# Patient Record
Sex: FEMALE | Race: ASIAN | NOT HISPANIC OR LATINO | Employment: PART TIME | URBAN - METROPOLITAN AREA
[De-identification: names, ages, dates, MRNs, and addresses within clinical notes are randomized per-mention and may not be internally consistent; named-entity substitution may affect disease eponyms.]

---

## 2017-08-04 ENCOUNTER — OFFICE VISIT - HEALTHEAST (OUTPATIENT)
Dept: FAMILY MEDICINE | Facility: CLINIC | Age: 27
End: 2017-08-04

## 2017-08-04 DIAGNOSIS — R53.83 FATIGUE: ICD-10-CM

## 2017-09-26 ENCOUNTER — COMMUNICATION - HEALTHEAST (OUTPATIENT)
Dept: FAMILY MEDICINE | Facility: CLINIC | Age: 27
End: 2017-09-26

## 2017-10-02 ENCOUNTER — COMMUNICATION - HEALTHEAST (OUTPATIENT)
Dept: FAMILY MEDICINE | Facility: CLINIC | Age: 27
End: 2017-10-02

## 2017-10-13 ENCOUNTER — OFFICE VISIT - HEALTHEAST (OUTPATIENT)
Dept: FAMILY MEDICINE | Facility: CLINIC | Age: 27
End: 2017-10-13

## 2017-10-13 DIAGNOSIS — L21.9 SEBORRHEIC DERMATITIS OF SCALP: ICD-10-CM

## 2017-10-13 DIAGNOSIS — Z31.69 ENCOUNTER FOR PRECONCEPTION CONSULTATION: ICD-10-CM

## 2017-10-13 DIAGNOSIS — Z30.432 ENCOUNTER FOR IUD REMOVAL: ICD-10-CM

## 2017-10-13 DIAGNOSIS — Z00.00 ROUTINE HEALTH MAINTENANCE: ICD-10-CM

## 2017-10-13 ASSESSMENT — MIFFLIN-ST. JEOR: SCORE: 1283.65

## 2018-01-10 ENCOUNTER — COMMUNICATION - HEALTHEAST (OUTPATIENT)
Dept: FAMILY MEDICINE | Facility: CLINIC | Age: 28
End: 2018-01-10

## 2018-01-10 ENCOUNTER — AMBULATORY - HEALTHEAST (OUTPATIENT)
Dept: ADMINISTRATIVE | Facility: CLINIC | Age: 28
End: 2018-01-10

## 2018-01-10 ENCOUNTER — PRENATAL OFFICE VISIT - HEALTHEAST (OUTPATIENT)
Dept: FAMILY MEDICINE | Facility: CLINIC | Age: 28
End: 2018-01-10

## 2018-01-10 DIAGNOSIS — Z34.90 NORMAL PREGNANCY: ICD-10-CM

## 2018-01-10 DIAGNOSIS — N92.6 ABNORMAL MENSES: ICD-10-CM

## 2018-01-10 DIAGNOSIS — Z32.01 POSITIVE PREGNANCY TEST: ICD-10-CM

## 2018-01-10 DIAGNOSIS — Z31.69 ENCOUNTER FOR PRECONCEPTION CONSULTATION: ICD-10-CM

## 2018-01-10 LAB
ALBUMIN UR-MCNC: NEGATIVE MG/DL
AMPHETAMINES UR QL SCN: NORMAL
BARBITURATES UR QL: NORMAL
BASOPHILS # BLD AUTO: 0 THOU/UL (ref 0–0.2)
BASOPHILS NFR BLD AUTO: 0 % (ref 0–2)
BENZODIAZ UR QL: NORMAL
CANNABINOIDS UR QL SCN: NORMAL
COCAINE UR QL: NORMAL
COLLECTION METHOD: NORMAL
CREAT UR-MCNC: 156.7 MG/DL
EOSINOPHIL # BLD AUTO: 0.2 THOU/UL (ref 0–0.4)
EOSINOPHIL NFR BLD AUTO: 2 % (ref 0–6)
ERYTHROCYTE [DISTWIDTH] IN BLOOD BY AUTOMATED COUNT: 13.1 % (ref 11–14.5)
GLUCOSE UR STRIP-MCNC: NEGATIVE MG/DL
HCG UR QL: POSITIVE
HCT VFR BLD AUTO: 41.9 % (ref 35–47)
HGB BLD-MCNC: 13.7 G/DL (ref 12–16)
HIV 1+2 AB+HIV1 P24 AG SERPL QL IA: NEGATIVE
KETONES UR STRIP-MCNC: NEGATIVE MG/DL
LEAD BLD-MCNC: NORMAL UG/DL
LEAD RETEST: NO
LYMPHOCYTES # BLD AUTO: 1.8 THOU/UL (ref 0.8–4.4)
LYMPHOCYTES NFR BLD AUTO: 16 % (ref 20–40)
MCH RBC QN AUTO: 27.1 PG (ref 27–34)
MCHC RBC AUTO-ENTMCNC: 32.7 G/DL (ref 32–36)
MCV RBC AUTO: 83 FL (ref 80–100)
MONOCYTES # BLD AUTO: 0.5 THOU/UL (ref 0–0.9)
MONOCYTES NFR BLD AUTO: 5 % (ref 2–10)
NEUTROPHILS # BLD AUTO: 8.4 THOU/UL (ref 2–7.7)
NEUTROPHILS NFR BLD AUTO: 77 % (ref 50–70)
OPIATES UR QL SCN: NORMAL
OXYCODONE UR QL: NORMAL
PCP UR QL SCN: NORMAL
PLATELET # BLD AUTO: 340 THOU/UL (ref 140–440)
PMV BLD AUTO: 10.6 FL (ref 8.5–12.5)
RBC # BLD AUTO: 5.06 MILL/UL (ref 3.8–5.4)
WBC: 11 THOU/UL (ref 4–11)

## 2018-01-11 LAB
ABO/RH(D): NORMAL
ABORH REPEAT: NORMAL
ANTIBODY SCREEN: NEGATIVE
BACTERIA SPEC CULT: NO GROWTH
GUARDIAN FIRST NAME: NORMAL
GUARDIAN LAST NAME: NORMAL
HBV SURFACE AG SERPL QL IA: NEGATIVE
HEALTH CARE PROVIDER CITY: NORMAL
HEALTH CARE PROVIDER NAME: NORMAL
HEALTH CARE PROVIDER PHONE: NORMAL
HEALTH CARE PROVIDER STATE: NORMAL
HEALTH CARE PROVIDER STREET ADDRESS: NORMAL
HEALTH CARE PROVIDER ZIP CODE: NORMAL
LEAD, B: 1.5 MCG/DL (ref 0–4.9)
PATIENT CITY: NORMAL
PATIENT COUNTY: NORMAL
PATIENT EMPLOYER: NORMAL
PATIENT ETHNICITY: NORMAL
PATIENT HOME PHONE: NORMAL
PATIENT OCCUPATION: NORMAL
PATIENT RACE: NORMAL
PATIENT STATE: NORMAL
PATIENT STREET ADDRESS: NORMAL
PATIENT ZIP CODE: NORMAL
RUBV IGG SERPL QL IA: NORMAL
SUBMITTING LABORATORY PHONE: NORMAL
SYPHILIS RPR SCREEN - HISTORICAL: NORMAL
VENOUS/CAPILLARY: NORMAL

## 2018-02-02 ENCOUNTER — PRENATAL OFFICE VISIT - HEALTHEAST (OUTPATIENT)
Dept: FAMILY MEDICINE | Facility: CLINIC | Age: 28
End: 2018-02-02

## 2018-02-02 DIAGNOSIS — Z34.92 SUPERVISION OF NORMAL PREGNANCY IN SECOND TRIMESTER: ICD-10-CM

## 2018-02-02 LAB
ALBUMIN UR-MCNC: NEGATIVE MG/DL
APPEARANCE UR: CLEAR
BILIRUB UR QL STRIP: NEGATIVE
COLOR UR AUTO: YELLOW
GLUCOSE UR STRIP-MCNC: ABNORMAL MG/DL
HGB UR QL STRIP: NEGATIVE
KETONES UR STRIP-MCNC: NEGATIVE MG/DL
LEUKOCYTE ESTERASE UR QL STRIP: NEGATIVE
NITRATE UR QL: NEGATIVE
PH UR STRIP: 7 [PH] (ref 5–8)
SP GR UR STRIP: 1.01 (ref 1–1.03)
UROBILINOGEN UR STRIP-ACNC: ABNORMAL

## 2018-02-05 LAB
ALPHA FETO PROTEIN: NORMAL
CALCULATED AGE AT EDD: NORMAL
DOWN SYNDROME MATERNAL AGE RISK: NORMAL
DOWN SYNDROME SCREEN RISK ESTIMATE: NORMAL
EDD BY LMP: NORMAL
GA ON COLLECTION BY DATES: NORMAL WK,D
GA USED IN RISK ESTIMATE: NORMAL
GENERAL TEST INFORMATION: NORMAL
HCG, TOTAL: NORMAL
INHIBIN: NORMAL
INSULIN DIABETIC: NORMAL
INTERPRETATION: NORMAL
OTHER INFORMATION: NORMAL
PATIENT WEIGHT: 159 LBS
RACE OF MOTHER: NORMAL
RECOMMENDED FOLLOW UP: NORMAL
TRISOMY 18 SCREEN RISK ESTIMATE: NORMAL
UE3: NORMAL

## 2018-03-02 ENCOUNTER — AMBULATORY - HEALTHEAST (OUTPATIENT)
Dept: ADMINISTRATIVE | Facility: CLINIC | Age: 28
End: 2018-03-02

## 2018-03-02 ENCOUNTER — PRENATAL OFFICE VISIT - HEALTHEAST (OUTPATIENT)
Dept: FAMILY MEDICINE | Facility: CLINIC | Age: 28
End: 2018-03-02

## 2018-03-02 DIAGNOSIS — Z34.92 SUPERVISION OF NORMAL PREGNANCY IN SECOND TRIMESTER: ICD-10-CM

## 2018-03-05 ENCOUNTER — COMMUNICATION - HEALTHEAST (OUTPATIENT)
Dept: FAMILY MEDICINE | Facility: CLINIC | Age: 28
End: 2018-03-05

## 2018-03-30 ENCOUNTER — PRENATAL OFFICE VISIT - HEALTHEAST (OUTPATIENT)
Dept: FAMILY MEDICINE | Facility: CLINIC | Age: 28
End: 2018-03-30

## 2018-03-30 DIAGNOSIS — R06.00 DYSPNEA: ICD-10-CM

## 2018-03-30 DIAGNOSIS — Z34.92 SUPERVISION OF NORMAL PREGNANCY IN SECOND TRIMESTER: ICD-10-CM

## 2018-03-30 LAB
ALBUMIN UR-MCNC: ABNORMAL MG/DL
ANION GAP SERPL CALCULATED.3IONS-SCNC: 14 MMOL/L (ref 5–18)
APPEARANCE UR: CLEAR
ATRIAL RATE - MUSE: 114 BPM
BILIRUB UR QL STRIP: NEGATIVE
BUN SERPL-MCNC: 7 MG/DL (ref 8–22)
CALCIUM SERPL-MCNC: 9 MG/DL (ref 8.5–10.5)
CHLORIDE BLD-SCNC: 101 MMOL/L (ref 98–107)
CO2 SERPL-SCNC: 18 MMOL/L (ref 22–31)
COLOR UR AUTO: YELLOW
CREAT SERPL-MCNC: 0.57 MG/DL (ref 0.6–1.1)
DIASTOLIC BLOOD PRESSURE - MUSE: NORMAL MMHG
ERYTHROCYTE [DISTWIDTH] IN BLOOD BY AUTOMATED COUNT: 12 % (ref 11–14.5)
FERRITIN SERPL-MCNC: 50 NG/ML (ref 10–130)
GFR SERPL CREATININE-BSD FRML MDRD: >60 ML/MIN/1.73M2
GLUCOSE BLD-MCNC: 158 MG/DL (ref 70–125)
GLUCOSE UR STRIP-MCNC: ABNORMAL MG/DL
HBA1C MFR BLD: 6.2 % (ref 3.5–6)
HCT VFR BLD AUTO: 39.9 % (ref 35–47)
HGB BLD-MCNC: 12.6 G/DL (ref 12–16)
HGB UR QL STRIP: NEGATIVE
INTERPRETATION ECG - MUSE: NORMAL
KETONES UR STRIP-MCNC: ABNORMAL MG/DL
LEUKOCYTE ESTERASE UR QL STRIP: NEGATIVE
MCH RBC QN AUTO: 26.9 PG (ref 27–34)
MCHC RBC AUTO-ENTMCNC: 31.5 G/DL (ref 32–36)
MCV RBC AUTO: 85 FL (ref 80–100)
NITRATE UR QL: NEGATIVE
P AXIS - MUSE: 27 DEGREES
PH UR STRIP: 7 [PH] (ref 5–8)
PLATELET # BLD AUTO: 327 THOU/UL (ref 140–440)
PMV BLD AUTO: 7.9 FL (ref 7–10)
POTASSIUM BLD-SCNC: 3.7 MMOL/L (ref 3.5–5)
PR INTERVAL - MUSE: 138 MS
QRS DURATION - MUSE: 78 MS
QT - MUSE: 340 MS
QTC - MUSE: 468 MS
R AXIS - MUSE: 41 DEGREES
RBC # BLD AUTO: 4.68 MILL/UL (ref 3.8–5.4)
SODIUM SERPL-SCNC: 133 MMOL/L (ref 136–145)
SP GR UR STRIP: 1.02 (ref 1–1.03)
SYSTOLIC BLOOD PRESSURE - MUSE: NORMAL MMHG
T AXIS - MUSE: -21 DEGREES
T4 FREE SERPL-MCNC: 0.7 NG/DL (ref 0.7–1.8)
TSH SERPL DL<=0.005 MIU/L-ACNC: 0.75 UIU/ML (ref 0.3–5)
UROBILINOGEN UR STRIP-ACNC: ABNORMAL
VENTRICULAR RATE- MUSE: 114 BPM
WBC: 10.4 THOU/UL (ref 4–11)

## 2018-04-03 ENCOUNTER — RECORDS - HEALTHEAST (OUTPATIENT)
Dept: ADMINISTRATIVE | Facility: OTHER | Age: 28
End: 2018-04-03

## 2018-04-06 ENCOUNTER — AMBULATORY - HEALTHEAST (OUTPATIENT)
Dept: LAB | Facility: CLINIC | Age: 28
End: 2018-04-06

## 2018-04-06 ENCOUNTER — AMBULATORY - HEALTHEAST (OUTPATIENT)
Dept: FAMILY MEDICINE | Facility: CLINIC | Age: 28
End: 2018-04-06

## 2018-04-06 DIAGNOSIS — R73.09 ELEVATED GLUCOSE: ICD-10-CM

## 2018-04-06 DIAGNOSIS — O24.419 GESTATIONAL DIABETES: ICD-10-CM

## 2018-04-06 DIAGNOSIS — O24.419 GESTATIONAL DIABETES MELLITUS (GDM): ICD-10-CM

## 2018-04-06 LAB
FASTING STATUS PATIENT QL REPORTED: NO
GLUCOSE 1H P 50 G GLC PO SERPL-MCNC: 238 MG/DL (ref 70–139)

## 2018-04-09 ENCOUNTER — COMMUNICATION - HEALTHEAST (OUTPATIENT)
Dept: ENDOCRINOLOGY | Facility: CLINIC | Age: 28
End: 2018-04-09

## 2018-04-09 ENCOUNTER — COMMUNICATION - HEALTHEAST (OUTPATIENT)
Dept: FAMILY MEDICINE | Facility: CLINIC | Age: 28
End: 2018-04-09

## 2018-04-17 ENCOUNTER — AMBULATORY - HEALTHEAST (OUTPATIENT)
Dept: EDUCATION SERVICES | Facility: CLINIC | Age: 28
End: 2018-04-17

## 2018-04-17 DIAGNOSIS — O24.419 GESTATIONAL DIABETES MELLITUS (GDM): ICD-10-CM

## 2018-04-17 DIAGNOSIS — O24.419 GESTATIONAL DIABETES: ICD-10-CM

## 2018-04-19 ENCOUNTER — COMMUNICATION - HEALTHEAST (OUTPATIENT)
Dept: ADMINISTRATIVE | Facility: CLINIC | Age: 28
End: 2018-04-19

## 2018-04-19 ENCOUNTER — OFFICE VISIT - HEALTHEAST (OUTPATIENT)
Dept: ENDOCRINOLOGY | Facility: CLINIC | Age: 28
End: 2018-04-19

## 2018-04-19 ENCOUNTER — OFFICE VISIT - HEALTHEAST (OUTPATIENT)
Dept: EDUCATION SERVICES | Facility: CLINIC | Age: 28
End: 2018-04-19

## 2018-04-19 DIAGNOSIS — O24.113 PRE-EXISTING TYPE 2 DIABETES MELLITUS DURING PREGNANCY IN THIRD TRIMESTER: ICD-10-CM

## 2018-04-19 DIAGNOSIS — O24.419 GESTATIONAL DIABETES: ICD-10-CM

## 2018-04-19 ASSESSMENT — MIFFLIN-ST. JEOR: SCORE: 1358.5

## 2018-04-25 ENCOUNTER — COMMUNICATION - HEALTHEAST (OUTPATIENT)
Dept: FAMILY MEDICINE | Facility: CLINIC | Age: 28
End: 2018-04-25

## 2018-05-02 ENCOUNTER — COMMUNICATION - HEALTHEAST (OUTPATIENT)
Dept: FAMILY MEDICINE | Facility: CLINIC | Age: 28
End: 2018-05-02

## 2018-05-02 ENCOUNTER — PRENATAL OFFICE VISIT - HEALTHEAST (OUTPATIENT)
Dept: FAMILY MEDICINE | Facility: CLINIC | Age: 28
End: 2018-05-02

## 2018-05-02 DIAGNOSIS — Z34.93 SUPERVISION OF NORMAL PREGNANCY IN THIRD TRIMESTER: ICD-10-CM

## 2018-05-02 LAB
ALBUMIN UR-MCNC: NEGATIVE MG/DL
APPEARANCE UR: CLEAR
BILIRUB UR QL STRIP: NEGATIVE
COLOR UR AUTO: YELLOW
GLUCOSE UR STRIP-MCNC: NEGATIVE MG/DL
HGB UR QL STRIP: NEGATIVE
KETONES UR STRIP-MCNC: NEGATIVE MG/DL
LEUKOCYTE ESTERASE UR QL STRIP: NEGATIVE
NITRATE UR QL: NEGATIVE
PH UR STRIP: 7 [PH] (ref 5–8)
SP GR UR STRIP: 1.01 (ref 1–1.03)
UROBILINOGEN UR STRIP-ACNC: NORMAL

## 2018-05-03 ENCOUNTER — COMMUNICATION - HEALTHEAST (OUTPATIENT)
Dept: EDUCATION SERVICES | Facility: CLINIC | Age: 28
End: 2018-05-03

## 2018-05-03 ENCOUNTER — OFFICE VISIT - HEALTHEAST (OUTPATIENT)
Dept: EDUCATION SERVICES | Facility: CLINIC | Age: 28
End: 2018-05-03

## 2018-05-03 ENCOUNTER — OFFICE VISIT - HEALTHEAST (OUTPATIENT)
Dept: ENDOCRINOLOGY | Facility: CLINIC | Age: 28
End: 2018-05-03

## 2018-05-03 DIAGNOSIS — O24.113 PRE-EXISTING TYPE 2 DIABETES MELLITUS DURING PREGNANCY IN THIRD TRIMESTER: ICD-10-CM

## 2018-05-03 DIAGNOSIS — O24.414 INSULIN CONTROLLED GESTATIONAL DIABETES MELLITUS (GDM) IN THIRD TRIMESTER: ICD-10-CM

## 2018-05-03 ASSESSMENT — MIFFLIN-ST. JEOR: SCORE: 1368.02

## 2018-05-04 ENCOUNTER — COMMUNICATION - HEALTHEAST (OUTPATIENT)
Dept: FAMILY MEDICINE | Facility: CLINIC | Age: 28
End: 2018-05-04

## 2018-05-17 ENCOUNTER — COMMUNICATION - HEALTHEAST (OUTPATIENT)
Dept: EDUCATION SERVICES | Facility: CLINIC | Age: 28
End: 2018-05-17

## 2018-05-17 ENCOUNTER — OFFICE VISIT - HEALTHEAST (OUTPATIENT)
Dept: EDUCATION SERVICES | Facility: CLINIC | Age: 28
End: 2018-05-17

## 2018-05-17 DIAGNOSIS — O24.113 PRE-EXISTING TYPE 2 DIABETES MELLITUS DURING PREGNANCY IN THIRD TRIMESTER: ICD-10-CM

## 2018-05-22 ENCOUNTER — COMMUNICATION - HEALTHEAST (OUTPATIENT)
Dept: FAMILY MEDICINE | Facility: CLINIC | Age: 28
End: 2018-05-22

## 2018-05-22 ENCOUNTER — COMMUNICATION - HEALTHEAST (OUTPATIENT)
Dept: ENDOCRINOLOGY | Facility: CLINIC | Age: 28
End: 2018-05-22

## 2018-05-22 DIAGNOSIS — O24.419 GESTATIONAL DIABETES: ICD-10-CM

## 2018-05-23 ENCOUNTER — COMMUNICATION - HEALTHEAST (OUTPATIENT)
Dept: FAMILY MEDICINE | Facility: CLINIC | Age: 28
End: 2018-05-23

## 2018-05-23 ENCOUNTER — PRENATAL OFFICE VISIT - HEALTHEAST (OUTPATIENT)
Dept: FAMILY MEDICINE | Facility: CLINIC | Age: 28
End: 2018-05-23

## 2018-05-23 DIAGNOSIS — R06.2 WHEEZING: ICD-10-CM

## 2018-05-23 DIAGNOSIS — Z34.93 SUPERVISION OF NORMAL PREGNANCY IN THIRD TRIMESTER: ICD-10-CM

## 2018-05-24 ENCOUNTER — COMMUNICATION - HEALTHEAST (OUTPATIENT)
Dept: FAMILY MEDICINE | Facility: CLINIC | Age: 28
End: 2018-05-24

## 2018-05-31 ENCOUNTER — OFFICE VISIT - HEALTHEAST (OUTPATIENT)
Dept: ENDOCRINOLOGY | Facility: CLINIC | Age: 28
End: 2018-05-31

## 2018-05-31 ENCOUNTER — COMMUNICATION - HEALTHEAST (OUTPATIENT)
Dept: FAMILY MEDICINE | Facility: CLINIC | Age: 28
End: 2018-05-31

## 2018-05-31 ENCOUNTER — COMMUNICATION - HEALTHEAST (OUTPATIENT)
Dept: ENDOCRINOLOGY | Facility: CLINIC | Age: 28
End: 2018-05-31

## 2018-05-31 ENCOUNTER — OFFICE VISIT - HEALTHEAST (OUTPATIENT)
Dept: EDUCATION SERVICES | Facility: CLINIC | Age: 28
End: 2018-05-31

## 2018-05-31 DIAGNOSIS — O24.414 INSULIN CONTROLLED GESTATIONAL DIABETES MELLITUS (GDM) IN THIRD TRIMESTER: ICD-10-CM

## 2018-05-31 ASSESSMENT — MIFFLIN-ST. JEOR: SCORE: 1378

## 2018-06-05 ENCOUNTER — AMBULATORY - HEALTHEAST (OUTPATIENT)
Dept: EDUCATION SERVICES | Facility: CLINIC | Age: 28
End: 2018-06-05

## 2018-06-05 ENCOUNTER — COMMUNICATION - HEALTHEAST (OUTPATIENT)
Dept: FAMILY MEDICINE | Facility: CLINIC | Age: 28
End: 2018-06-05

## 2018-06-05 DIAGNOSIS — O24.414 INSULIN CONTROLLED GESTATIONAL DIABETES MELLITUS (GDM) IN THIRD TRIMESTER: ICD-10-CM

## 2018-06-07 ENCOUNTER — COMMUNICATION - HEALTHEAST (OUTPATIENT)
Dept: FAMILY MEDICINE | Facility: CLINIC | Age: 28
End: 2018-06-07

## 2018-06-07 ENCOUNTER — PRENATAL OFFICE VISIT - HEALTHEAST (OUTPATIENT)
Dept: FAMILY MEDICINE | Facility: CLINIC | Age: 28
End: 2018-06-07

## 2018-06-07 DIAGNOSIS — O24.419 GDM, CLASS A2: ICD-10-CM

## 2018-06-07 DIAGNOSIS — Z34.93 SUPERVISION OF NORMAL PREGNANCY IN THIRD TRIMESTER: ICD-10-CM

## 2018-06-07 LAB
ALBUMIN UR-MCNC: NEGATIVE MG/DL
APPEARANCE UR: CLEAR
BILIRUB UR QL STRIP: NEGATIVE
COLOR UR AUTO: YELLOW
GLUCOSE UR STRIP-MCNC: NEGATIVE MG/DL
HGB UR QL STRIP: NEGATIVE
KETONES UR STRIP-MCNC: NEGATIVE MG/DL
LEUKOCYTE ESTERASE UR QL STRIP: NEGATIVE
NITRATE UR QL: NEGATIVE
PH UR STRIP: 7 [PH] (ref 5–8)
SP GR UR STRIP: 1.02 (ref 1–1.03)
UROBILINOGEN UR STRIP-ACNC: NORMAL

## 2018-06-14 ENCOUNTER — OFFICE VISIT - HEALTHEAST (OUTPATIENT)
Dept: EDUCATION SERVICES | Facility: CLINIC | Age: 28
End: 2018-06-14

## 2018-06-14 ENCOUNTER — OFFICE VISIT - HEALTHEAST (OUTPATIENT)
Dept: ENDOCRINOLOGY | Facility: CLINIC | Age: 28
End: 2018-06-14

## 2018-06-14 ENCOUNTER — COMMUNICATION - HEALTHEAST (OUTPATIENT)
Dept: EDUCATION SERVICES | Facility: CLINIC | Age: 28
End: 2018-06-14

## 2018-06-14 ENCOUNTER — COMMUNICATION - HEALTHEAST (OUTPATIENT)
Dept: FAMILY MEDICINE | Facility: CLINIC | Age: 28
End: 2018-06-14

## 2018-06-14 DIAGNOSIS — O24.414 INSULIN CONTROLLED GESTATIONAL DIABETES MELLITUS (GDM) IN THIRD TRIMESTER: ICD-10-CM

## 2018-06-14 DIAGNOSIS — O24.113 PREGNANCY WITH TYPE 2 DIABETES MELLITUS IN THIRD TRIMESTER: ICD-10-CM

## 2018-06-14 ASSESSMENT — MIFFLIN-ST. JEOR: SCORE: 1390.7

## 2018-06-20 ENCOUNTER — COMMUNICATION - HEALTHEAST (OUTPATIENT)
Dept: EDUCATION SERVICES | Facility: CLINIC | Age: 28
End: 2018-06-20

## 2018-06-20 ENCOUNTER — OFFICE VISIT - HEALTHEAST (OUTPATIENT)
Dept: ENDOCRINOLOGY | Facility: CLINIC | Age: 28
End: 2018-06-20

## 2018-06-20 ENCOUNTER — COMMUNICATION - HEALTHEAST (OUTPATIENT)
Dept: ENDOCRINOLOGY | Facility: CLINIC | Age: 28
End: 2018-06-20

## 2018-06-20 ENCOUNTER — OFFICE VISIT - HEALTHEAST (OUTPATIENT)
Dept: EDUCATION SERVICES | Facility: CLINIC | Age: 28
End: 2018-06-20

## 2018-06-20 DIAGNOSIS — O24.414 INSULIN CONTROLLED GESTATIONAL DIABETES MELLITUS (GDM) IN THIRD TRIMESTER: ICD-10-CM

## 2018-06-20 ASSESSMENT — MIFFLIN-ST. JEOR: SCORE: 1387.98

## 2018-06-21 ENCOUNTER — HOSPITAL ENCOUNTER (OUTPATIENT)
Dept: ULTRASOUND IMAGING | Facility: HOSPITAL | Age: 28
Discharge: HOME | End: 2018-06-21
Attending: FAMILY MEDICINE

## 2018-06-21 DIAGNOSIS — O24.419 GDM, CLASS A2: ICD-10-CM

## 2018-06-27 ENCOUNTER — OFFICE VISIT - HEALTHEAST (OUTPATIENT)
Dept: ENDOCRINOLOGY | Facility: CLINIC | Age: 28
End: 2018-06-27

## 2018-06-27 ENCOUNTER — OFFICE VISIT - HEALTHEAST (OUTPATIENT)
Dept: EDUCATION SERVICES | Facility: CLINIC | Age: 28
End: 2018-06-27

## 2018-06-27 DIAGNOSIS — O24.414 INSULIN CONTROLLED GESTATIONAL DIABETES MELLITUS (GDM) IN THIRD TRIMESTER: ICD-10-CM

## 2018-06-27 DIAGNOSIS — O24.113 PRE-EXISTING TYPE 2 DIABETES MELLITUS DURING PREGNANCY IN THIRD TRIMESTER: ICD-10-CM

## 2018-06-27 ASSESSMENT — MIFFLIN-ST. JEOR: SCORE: 1414.74

## 2018-07-03 ENCOUNTER — COMMUNICATION - HEALTHEAST (OUTPATIENT)
Dept: FAMILY MEDICINE | Facility: CLINIC | Age: 28
End: 2018-07-03

## 2018-07-03 ENCOUNTER — AMBULATORY - HEALTHEAST (OUTPATIENT)
Dept: EDUCATION SERVICES | Facility: CLINIC | Age: 28
End: 2018-07-03

## 2018-07-03 DIAGNOSIS — O24.414 INSULIN CONTROLLED GESTATIONAL DIABETES MELLITUS (GDM) IN THIRD TRIMESTER: ICD-10-CM

## 2018-07-03 LAB — HBA1C MFR BLD: 6.8 % (ref 3.5–6)

## 2018-07-05 ENCOUNTER — ANESTHESIA - HEALTHEAST (OUTPATIENT)
Dept: OBGYN | Facility: HOSPITAL | Age: 28
End: 2018-07-05

## 2018-07-05 ENCOUNTER — COMMUNICATION - HEALTHEAST (OUTPATIENT)
Dept: FAMILY MEDICINE | Facility: CLINIC | Age: 28
End: 2018-07-05

## 2018-07-05 ENCOUNTER — HOSPITAL ENCOUNTER (OUTPATIENT)
Dept: ULTRASOUND IMAGING | Facility: HOSPITAL | Age: 28
Discharge: HOME | End: 2018-07-05
Attending: FAMILY MEDICINE

## 2018-07-05 ENCOUNTER — PRENATAL OFFICE VISIT - HEALTHEAST (OUTPATIENT)
Dept: FAMILY MEDICINE | Facility: CLINIC | Age: 28
End: 2018-07-05

## 2018-07-05 DIAGNOSIS — O24.414 INSULIN CONTROLLED GESTATIONAL DIABETES MELLITUS (GDM) IN THIRD TRIMESTER: ICD-10-CM

## 2018-07-05 DIAGNOSIS — Z34.93 SUPERVISION OF NORMAL PREGNANCY IN THIRD TRIMESTER: ICD-10-CM

## 2018-07-05 LAB
ALBUMIN UR-MCNC: ABNORMAL MG/DL
APPEARANCE UR: CLEAR
BILIRUB UR QL STRIP: NEGATIVE
COLOR UR AUTO: YELLOW
GLUCOSE UR STRIP-MCNC: ABNORMAL MG/DL
HGB UR QL STRIP: NEGATIVE
KETONES UR STRIP-MCNC: NEGATIVE MG/DL
LEUKOCYTE ESTERASE UR QL STRIP: NEGATIVE
NITRATE UR QL: NEGATIVE
PH UR STRIP: 7 [PH] (ref 5–8)
SP GR UR STRIP: 1.02 (ref 1–1.03)
UROBILINOGEN UR STRIP-ACNC: ABNORMAL

## 2018-07-06 ENCOUNTER — RECORDS - HEALTHEAST (OUTPATIENT)
Dept: ADMINISTRATIVE | Facility: OTHER | Age: 28
End: 2018-07-06

## 2018-07-06 ENCOUNTER — SURGERY - HEALTHEAST (OUTPATIENT)
Dept: OBGYN | Facility: HOSPITAL | Age: 28
End: 2018-07-06

## 2018-07-06 ASSESSMENT — MIFFLIN-ST. JEOR: SCORE: 1445.25

## 2018-07-09 ASSESSMENT — MIFFLIN-ST. JEOR: SCORE: 1381.29

## 2018-07-10 ENCOUNTER — HOME CARE/HOSPICE - HEALTHEAST (OUTPATIENT)
Dept: HOME HEALTH SERVICES | Facility: HOME HEALTH | Age: 28
End: 2018-07-10

## 2018-07-11 ENCOUNTER — HOME CARE/HOSPICE - HEALTHEAST (OUTPATIENT)
Dept: HOME HEALTH SERVICES | Facility: HOME HEALTH | Age: 28
End: 2018-07-11

## 2018-08-07 ENCOUNTER — OFFICE VISIT - HEALTHEAST (OUTPATIENT)
Dept: FAMILY MEDICINE | Facility: CLINIC | Age: 28
End: 2018-08-07

## 2018-08-07 DIAGNOSIS — O24.419 GESTATIONAL DIABETES MELLITUS: ICD-10-CM

## 2018-08-07 DIAGNOSIS — Z98.891 S/P CESAREAN SECTION: ICD-10-CM

## 2018-08-07 DIAGNOSIS — Z30.013 ENCOUNTER FOR INITIAL PRESCRIPTION OF INJECTABLE CONTRACEPTIVE: ICD-10-CM

## 2018-09-10 ENCOUNTER — AMBULATORY - HEALTHEAST (OUTPATIENT)
Dept: LAB | Facility: CLINIC | Age: 28
End: 2018-09-10

## 2018-09-10 DIAGNOSIS — O24.419 GESTATIONAL DIABETES MELLITUS: ICD-10-CM

## 2018-09-10 LAB
FASTING STATUS PATIENT QL REPORTED: YES
GLUCOSE 2H P 75 G GLC PO SERPL-MCNC: 153 MG/DL
NON GESTATIONAL GTT FASTING: 127 MG/DL

## 2018-09-24 ENCOUNTER — OFFICE VISIT - HEALTHEAST (OUTPATIENT)
Dept: FAMILY MEDICINE | Facility: CLINIC | Age: 28
End: 2018-09-24

## 2018-09-24 DIAGNOSIS — R74.8 ELEVATED LIVER ENZYMES: ICD-10-CM

## 2018-09-24 DIAGNOSIS — E66.09 CLASS 1 OBESITY DUE TO EXCESS CALORIES WITH SERIOUS COMORBIDITY AND BODY MASS INDEX (BMI) OF 30.0 TO 30.9 IN ADULT: ICD-10-CM

## 2018-09-24 DIAGNOSIS — E11.9 DIABETES MELLITUS, TYPE 2 (H): ICD-10-CM

## 2018-09-24 DIAGNOSIS — Z23 NEEDS FLU SHOT: ICD-10-CM

## 2018-09-24 DIAGNOSIS — E66.811 CLASS 1 OBESITY DUE TO EXCESS CALORIES WITH SERIOUS COMORBIDITY AND BODY MASS INDEX (BMI) OF 30.0 TO 30.9 IN ADULT: ICD-10-CM

## 2018-09-24 LAB
ALBUMIN SERPL-MCNC: 4.3 G/DL (ref 3.5–5)
ALP SERPL-CCNC: 100 U/L (ref 45–120)
ALT SERPL W P-5'-P-CCNC: 59 U/L (ref 0–45)
ANION GAP SERPL CALCULATED.3IONS-SCNC: 11 MMOL/L (ref 5–18)
AST SERPL W P-5'-P-CCNC: 42 U/L (ref 0–40)
BILIRUB SERPL-MCNC: 1.5 MG/DL (ref 0–1)
BUN SERPL-MCNC: 11 MG/DL (ref 8–22)
CALCIUM SERPL-MCNC: 10.1 MG/DL (ref 8.5–10.5)
CHLORIDE BLD-SCNC: 105 MMOL/L (ref 98–107)
CHOLEST SERPL-MCNC: 236 MG/DL
CO2 SERPL-SCNC: 22 MMOL/L (ref 22–31)
CREAT SERPL-MCNC: 0.69 MG/DL (ref 0.6–1.1)
FASTING STATUS PATIENT QL REPORTED: YES
GFR SERPL CREATININE-BSD FRML MDRD: >60 ML/MIN/1.73M2
GLUCOSE BLD-MCNC: 130 MG/DL (ref 70–125)
HBA1C MFR BLD: 6.9 % (ref 3.5–6)
HDLC SERPL-MCNC: 41 MG/DL
LDLC SERPL CALC-MCNC: 161 MG/DL
POTASSIUM BLD-SCNC: 4.4 MMOL/L (ref 3.5–5)
PROT SERPL-MCNC: 8.2 G/DL (ref 6–8)
SODIUM SERPL-SCNC: 138 MMOL/L (ref 136–145)
TRIGL SERPL-MCNC: 169 MG/DL

## 2018-09-28 ENCOUNTER — OFFICE VISIT - HEALTHEAST (OUTPATIENT)
Dept: FAMILY MEDICINE | Facility: CLINIC | Age: 28
End: 2018-09-28

## 2018-09-28 DIAGNOSIS — Z30.430 ENCOUNTER FOR IUD INSERTION: ICD-10-CM

## 2018-09-28 ASSESSMENT — MIFFLIN-ST. JEOR: SCORE: 1321.64

## 2018-10-30 ENCOUNTER — COMMUNICATION - HEALTHEAST (OUTPATIENT)
Dept: FAMILY MEDICINE | Facility: CLINIC | Age: 28
End: 2018-10-30

## 2018-10-30 ENCOUNTER — OFFICE VISIT - HEALTHEAST (OUTPATIENT)
Dept: FAMILY MEDICINE | Facility: CLINIC | Age: 28
End: 2018-10-30

## 2018-10-30 DIAGNOSIS — E11.9 TYPE 2 DIABETES MELLITUS WITHOUT COMPLICATION, WITHOUT LONG-TERM CURRENT USE OF INSULIN (H): ICD-10-CM

## 2018-10-30 DIAGNOSIS — Z30.431 IUD CHECK UP: ICD-10-CM

## 2018-10-30 LAB
CREAT UR-MCNC: 63.5 MG/DL
MICROALBUMIN UR-MCNC: 2.53 MG/DL (ref 0–1.99)
MICROALBUMIN/CREAT UR: 39.8 MG/G

## 2018-11-07 ENCOUNTER — COMMUNICATION - HEALTHEAST (OUTPATIENT)
Dept: FAMILY MEDICINE | Facility: CLINIC | Age: 28
End: 2018-11-07

## 2019-01-02 ENCOUNTER — COMMUNICATION - HEALTHEAST (OUTPATIENT)
Dept: FAMILY MEDICINE | Facility: CLINIC | Age: 29
End: 2019-01-02

## 2019-01-02 ENCOUNTER — OFFICE VISIT - HEALTHEAST (OUTPATIENT)
Dept: FAMILY MEDICINE | Facility: CLINIC | Age: 29
End: 2019-01-02

## 2019-01-02 DIAGNOSIS — E66.811 CLASS 1 OBESITY DUE TO EXCESS CALORIES WITH SERIOUS COMORBIDITY AND BODY MASS INDEX (BMI) OF 30.0 TO 30.9 IN ADULT: ICD-10-CM

## 2019-01-02 DIAGNOSIS — E11.9 TYPE 2 DIABETES MELLITUS WITHOUT COMPLICATION, WITHOUT LONG-TERM CURRENT USE OF INSULIN (H): ICD-10-CM

## 2019-01-02 DIAGNOSIS — Z86.2 HISTORY OF ANEMIA: ICD-10-CM

## 2019-01-02 DIAGNOSIS — R74.8 ELEVATED LIVER ENZYMES: ICD-10-CM

## 2019-01-02 DIAGNOSIS — E66.09 CLASS 1 OBESITY DUE TO EXCESS CALORIES WITH SERIOUS COMORBIDITY AND BODY MASS INDEX (BMI) OF 30.0 TO 30.9 IN ADULT: ICD-10-CM

## 2019-01-02 LAB
ALBUMIN SERPL-MCNC: 4.3 G/DL (ref 3.5–5)
ALP SERPL-CCNC: 85 U/L (ref 45–120)
ALT SERPL W P-5'-P-CCNC: 59 U/L (ref 0–45)
ANION GAP SERPL CALCULATED.3IONS-SCNC: 15 MMOL/L (ref 5–18)
AST SERPL W P-5'-P-CCNC: 39 U/L (ref 0–40)
BILIRUB SERPL-MCNC: 0.6 MG/DL (ref 0–1)
BUN SERPL-MCNC: 11 MG/DL (ref 8–22)
CALCIUM SERPL-MCNC: 10.7 MG/DL (ref 8.5–10.5)
CHLORIDE BLD-SCNC: 101 MMOL/L (ref 98–107)
CO2 SERPL-SCNC: 23 MMOL/L (ref 22–31)
CREAT SERPL-MCNC: 0.69 MG/DL (ref 0.6–1.1)
CREAT UR-MCNC: 40.1 MG/DL
ERYTHROCYTE [DISTWIDTH] IN BLOOD BY AUTOMATED COUNT: 11.3 % (ref 11–14.5)
GFR SERPL CREATININE-BSD FRML MDRD: >60 ML/MIN/1.73M2
GLUCOSE BLD-MCNC: 128 MG/DL (ref 70–125)
HBA1C MFR BLD: 7.1 % (ref 3.5–6)
HCT VFR BLD AUTO: 46.8 % (ref 35–47)
HGB BLD-MCNC: 15.2 G/DL (ref 12–16)
MCH RBC QN AUTO: 26.3 PG (ref 27–34)
MCHC RBC AUTO-ENTMCNC: 32.6 G/DL (ref 32–36)
MCV RBC AUTO: 81 FL (ref 80–100)
MICROALBUMIN UR-MCNC: 3.57 MG/DL (ref 0–1.99)
MICROALBUMIN/CREAT UR: 89 MG/G
PLATELET # BLD AUTO: 347 THOU/UL (ref 140–440)
PMV BLD AUTO: 7.7 FL (ref 7–10)
POTASSIUM BLD-SCNC: 4.2 MMOL/L (ref 3.5–5)
PROT SERPL-MCNC: 8.2 G/DL (ref 6–8)
RBC # BLD AUTO: 5.79 MILL/UL (ref 3.8–5.4)
SODIUM SERPL-SCNC: 139 MMOL/L (ref 136–145)
WBC: 6.6 THOU/UL (ref 4–11)

## 2019-01-03 ENCOUNTER — AMBULATORY - HEALTHEAST (OUTPATIENT)
Dept: FAMILY MEDICINE | Facility: CLINIC | Age: 29
End: 2019-01-03

## 2019-01-03 DIAGNOSIS — R77.9 ELEVATED BLOOD PROTEIN: ICD-10-CM

## 2019-01-03 LAB
HBV SURFACE AG SERPL QL IA: NEGATIVE
HCV AB SERPL QL IA: NEGATIVE
HEPATITIS B SURFACE ANTIBODY LHE- HISTORICAL: NEGATIVE

## 2019-01-04 LAB — HAV IGG SER QL IA: POSITIVE

## 2019-02-06 ENCOUNTER — AMBULATORY - HEALTHEAST (OUTPATIENT)
Dept: MULTI SPECIALTY CLINIC | Facility: CLINIC | Age: 29
End: 2019-02-06

## 2019-02-06 ENCOUNTER — RECORDS - HEALTHEAST (OUTPATIENT)
Dept: ADMINISTRATIVE | Facility: OTHER | Age: 29
End: 2019-02-06

## 2019-04-03 ENCOUNTER — OFFICE VISIT - HEALTHEAST (OUTPATIENT)
Dept: FAMILY MEDICINE | Facility: CLINIC | Age: 29
End: 2019-04-03

## 2019-04-03 DIAGNOSIS — N92.6 IRREGULAR MENSES: ICD-10-CM

## 2019-04-03 DIAGNOSIS — B37.31 YEAST INFECTION OF THE VAGINA: ICD-10-CM

## 2019-04-03 DIAGNOSIS — E11.9 TYPE 2 DIABETES MELLITUS WITHOUT COMPLICATION, WITHOUT LONG-TERM CURRENT USE OF INSULIN (H): ICD-10-CM

## 2019-04-03 LAB
ANION GAP SERPL CALCULATED.3IONS-SCNC: 10 MMOL/L (ref 5–18)
BUN SERPL-MCNC: 11 MG/DL (ref 8–22)
CALCIUM SERPL-MCNC: 10.3 MG/DL (ref 8.5–10.5)
CHLORIDE BLD-SCNC: 102 MMOL/L (ref 98–107)
CO2 SERPL-SCNC: 24 MMOL/L (ref 22–31)
CREAT SERPL-MCNC: 0.69 MG/DL (ref 0.6–1.1)
GFR SERPL CREATININE-BSD FRML MDRD: >60 ML/MIN/1.73M2
GLUCOSE BLD-MCNC: 153 MG/DL (ref 70–125)
HBA1C MFR BLD: 6.6 % (ref 3.5–6)
HCG UR QL: NEGATIVE
POTASSIUM BLD-SCNC: 4.2 MMOL/L (ref 3.5–5)
PROLACTIN SERPL-MCNC: 14.8 NG/ML (ref 0–20)
SODIUM SERPL-SCNC: 136 MMOL/L (ref 136–145)
TSH SERPL DL<=0.005 MIU/L-ACNC: 0.72 UIU/ML (ref 0.3–5)

## 2019-04-04 LAB — DHEA-S SERPL-MCNC: 158 UG/DL (ref 45–270)

## 2019-04-08 LAB
SHBG SERPL-SCNC: 42 NMOL/L (ref 30–135)
TESTOST FREE SERPL-MCNC: 0.21 NG/DL (ref 0.08–0.74)
TESTOST SERPL-MCNC: 14 NG/DL (ref 8–60)

## 2019-09-24 ENCOUNTER — OFFICE VISIT - HEALTHEAST (OUTPATIENT)
Dept: FAMILY MEDICINE | Facility: CLINIC | Age: 29
End: 2019-09-24

## 2019-09-24 DIAGNOSIS — R06.2 WHEEZING: ICD-10-CM

## 2019-09-24 DIAGNOSIS — E11.9 TYPE 2 DIABETES MELLITUS WITHOUT COMPLICATION, WITHOUT LONG-TERM CURRENT USE OF INSULIN (H): ICD-10-CM

## 2019-09-24 LAB — HBA1C MFR BLD: 6.2 % (ref 3.5–6)

## 2019-09-27 ENCOUNTER — COMMUNICATION - HEALTHEAST (OUTPATIENT)
Dept: FAMILY MEDICINE | Facility: CLINIC | Age: 29
End: 2019-09-27

## 2019-10-03 ENCOUNTER — COMMUNICATION - HEALTHEAST (OUTPATIENT)
Dept: FAMILY MEDICINE | Facility: CLINIC | Age: 29
End: 2019-10-03

## 2019-10-08 ENCOUNTER — OFFICE VISIT - HEALTHEAST (OUTPATIENT)
Dept: FAMILY MEDICINE | Facility: CLINIC | Age: 29
End: 2019-10-08

## 2019-10-08 DIAGNOSIS — R06.2 WHEEZING: ICD-10-CM

## 2019-10-08 DIAGNOSIS — Z23 NEED FOR IMMUNIZATION AGAINST INFLUENZA: ICD-10-CM

## 2020-04-10 ENCOUNTER — AMBULATORY - HEALTHEAST (OUTPATIENT)
Dept: FAMILY MEDICINE | Facility: CLINIC | Age: 30
End: 2020-04-10

## 2020-05-11 ENCOUNTER — COMMUNICATION - HEALTHEAST (OUTPATIENT)
Dept: SCHEDULING | Facility: CLINIC | Age: 30
End: 2020-05-11

## 2020-05-11 ENCOUNTER — OFFICE VISIT - HEALTHEAST (OUTPATIENT)
Dept: FAMILY MEDICINE | Facility: CLINIC | Age: 30
End: 2020-05-11

## 2020-05-11 DIAGNOSIS — R06.2 WHEEZING: ICD-10-CM

## 2020-05-11 RX ORDER — ALBUTEROL SULFATE 90 UG/1
2 AEROSOL, METERED RESPIRATORY (INHALATION) EVERY 6 HOURS PRN
Qty: 1 EACH | Refills: 2 | Status: SHIPPED | OUTPATIENT
Start: 2020-05-11

## 2020-05-19 ENCOUNTER — VIRTUAL VISIT (OUTPATIENT)
Dept: URGENT CARE | Facility: CLINIC | Age: 30
End: 2020-05-19
Payer: COMMERCIAL

## 2020-05-19 DIAGNOSIS — R05.9 COUGH: Primary | ICD-10-CM

## 2020-05-19 PROCEDURE — 99202 OFFICE O/P NEW SF 15 MIN: CPT | Mod: 95 | Performed by: EMERGENCY MEDICINE

## 2020-05-19 NOTE — PROGRESS NOTES
Via :  29 y/o female with 4 days of cough, ST, fatigue and weakness. Night sweats but no fever. History positive for asthma and diabetes. No SOB.      PE: NAD on phone  IMP: Covid sxs. Will order testing    PLAN: Covid testing. Follow up with PCP in one week (10 days since start of illness), sooner if worse.    TIME:20 min

## 2020-05-20 ENCOUNTER — AMBULATORY - HEALTHEAST (OUTPATIENT)
Dept: FAMILY MEDICINE | Facility: CLINIC | Age: 30
End: 2020-05-20

## 2020-05-20 ENCOUNTER — OFFICE VISIT - HEALTHEAST (OUTPATIENT)
Dept: FAMILY MEDICINE | Facility: CLINIC | Age: 30
End: 2020-05-20

## 2020-05-20 DIAGNOSIS — Z20.822 SUSPECTED COVID-19 VIRUS INFECTION: ICD-10-CM

## 2020-05-21 ENCOUNTER — COMMUNICATION - HEALTHEAST (OUTPATIENT)
Dept: FAMILY MEDICINE | Facility: CLINIC | Age: 30
End: 2020-05-21

## 2020-05-27 ENCOUNTER — OFFICE VISIT - HEALTHEAST (OUTPATIENT)
Dept: FAMILY MEDICINE | Facility: CLINIC | Age: 30
End: 2020-05-27

## 2020-05-27 DIAGNOSIS — U07.1 COVID-19: ICD-10-CM

## 2020-08-10 ENCOUNTER — COMMUNICATION - HEALTHEAST (OUTPATIENT)
Dept: FAMILY MEDICINE | Facility: CLINIC | Age: 30
End: 2020-08-10

## 2020-08-19 ENCOUNTER — OFFICE VISIT - HEALTHEAST (OUTPATIENT)
Dept: FAMILY MEDICINE | Facility: CLINIC | Age: 30
End: 2020-08-19

## 2020-08-19 DIAGNOSIS — R74.8 ELEVATED LIVER ENZYMES: ICD-10-CM

## 2020-08-19 DIAGNOSIS — E11.9 TYPE 2 DIABETES MELLITUS WITHOUT COMPLICATION, WITHOUT LONG-TERM CURRENT USE OF INSULIN (H): ICD-10-CM

## 2020-08-19 DIAGNOSIS — E11.8 TYPE 2 DIABETES MELLITUS WITH UNSPECIFIED COMPLICATIONS (H): ICD-10-CM

## 2020-08-19 DIAGNOSIS — J45.40 MODERATE PERSISTENT ASTHMA WITHOUT COMPLICATION: ICD-10-CM

## 2020-08-19 LAB
ALBUMIN SERPL-MCNC: 3.6 G/DL (ref 3.5–5)
ALP SERPL-CCNC: 94 U/L (ref 45–120)
ALT SERPL W P-5'-P-CCNC: 147 U/L (ref 0–45)
ANION GAP SERPL CALCULATED.3IONS-SCNC: 10 MMOL/L (ref 5–18)
AST SERPL W P-5'-P-CCNC: 101 U/L (ref 0–40)
BILIRUB DIRECT SERPL-MCNC: <0.1 MG/DL
BILIRUB SERPL-MCNC: 0.2 MG/DL (ref 0–1)
BUN SERPL-MCNC: 11 MG/DL (ref 8–22)
CALCIUM SERPL-MCNC: 8.9 MG/DL (ref 8.5–10.5)
CHLORIDE BLD-SCNC: 103 MMOL/L (ref 98–107)
CHOLEST SERPL-MCNC: 213 MG/DL
CO2 SERPL-SCNC: 25 MMOL/L (ref 22–31)
CREAT SERPL-MCNC: 0.75 MG/DL (ref 0.6–1.1)
CREAT UR-MCNC: 39.2 MG/DL
FASTING STATUS PATIENT QL REPORTED: NO
GFR SERPL CREATININE-BSD FRML MDRD: >60 ML/MIN/1.73M2
GLUCOSE BLD-MCNC: 322 MG/DL (ref 70–125)
HBA1C MFR BLD: 8 %
HDLC SERPL-MCNC: 39 MG/DL
LDLC SERPL CALC-MCNC: 100 MG/DL
MICROALBUMIN UR-MCNC: 1.82 MG/DL (ref 0–1.99)
MICROALBUMIN/CREAT UR: 46.4 MG/G
POTASSIUM BLD-SCNC: 3.8 MMOL/L (ref 3.5–5)
PROT SERPL-MCNC: 7.2 G/DL (ref 6–8)
SODIUM SERPL-SCNC: 138 MMOL/L (ref 136–145)
TRIGL SERPL-MCNC: 372 MG/DL

## 2020-08-19 RX ORDER — BUDESONIDE AND FORMOTEROL FUMARATE DIHYDRATE 160; 4.5 UG/1; UG/1
2 AEROSOL RESPIRATORY (INHALATION) 2 TIMES DAILY
Qty: 1 INHALER | Refills: 12 | Status: SHIPPED | OUTPATIENT
Start: 2020-08-19 | End: 2023-07-26

## 2020-08-19 ASSESSMENT — MIFFLIN-ST. JEOR: SCORE: 1333.55

## 2021-01-15 ENCOUNTER — COMMUNICATION - HEALTHEAST (OUTPATIENT)
Dept: FAMILY MEDICINE | Facility: CLINIC | Age: 31
End: 2021-01-15

## 2021-01-21 ENCOUNTER — OFFICE VISIT - HEALTHEAST (OUTPATIENT)
Dept: FAMILY MEDICINE | Facility: CLINIC | Age: 31
End: 2021-01-21

## 2021-01-21 DIAGNOSIS — J45.30 MILD PERSISTENT ASTHMA WITHOUT COMPLICATION: ICD-10-CM

## 2021-01-21 DIAGNOSIS — Z23 NEED FOR HEPATITIS B VACCINATION: ICD-10-CM

## 2021-01-21 DIAGNOSIS — Z12.4 SCREENING FOR MALIGNANT NEOPLASM OF CERVIX: ICD-10-CM

## 2021-01-21 DIAGNOSIS — B96.89 BACTERIAL VAGINOSIS: ICD-10-CM

## 2021-01-21 DIAGNOSIS — N76.0 BACTERIAL VAGINOSIS: ICD-10-CM

## 2021-01-21 DIAGNOSIS — R74.8 ELEVATED LIVER ENZYMES: ICD-10-CM

## 2021-01-21 DIAGNOSIS — E11.8 TYPE 2 DIABETES MELLITUS WITH UNSPECIFIED COMPLICATIONS (H): ICD-10-CM

## 2021-01-21 DIAGNOSIS — N89.8 VAGINAL DISCHARGE: ICD-10-CM

## 2021-01-21 DIAGNOSIS — Z23 NEED FOR INFLUENZA VACCINATION: ICD-10-CM

## 2021-01-21 DIAGNOSIS — B37.31 YEAST INFECTION OF THE VAGINA: ICD-10-CM

## 2021-01-21 LAB
ANION GAP SERPL CALCULATED.3IONS-SCNC: 11 MMOL/L (ref 5–18)
BUN SERPL-MCNC: 10 MG/DL (ref 8–22)
CALCIUM SERPL-MCNC: 9.7 MG/DL (ref 8.5–10.5)
CHLORIDE BLD-SCNC: 101 MMOL/L (ref 98–107)
CLUE CELLS: ABNORMAL
CO2 SERPL-SCNC: 23 MMOL/L (ref 22–31)
CREAT SERPL-MCNC: 0.74 MG/DL (ref 0.6–1.1)
GFR SERPL CREATININE-BSD FRML MDRD: >60 ML/MIN/1.73M2
GLUCOSE BLD-MCNC: 177 MG/DL (ref 70–125)
HBA1C MFR BLD: 8.2 %
POTASSIUM BLD-SCNC: 4.3 MMOL/L (ref 3.5–5)
SODIUM SERPL-SCNC: 135 MMOL/L (ref 136–145)
TRICHOMONAS, WET PREP: ABNORMAL
YEAST, WET PREP: ABNORMAL

## 2021-01-22 LAB
ALBUMIN SERPL-MCNC: 4 G/DL (ref 3.5–5)
ALP SERPL-CCNC: 93 U/L (ref 45–120)
ALT SERPL W P-5'-P-CCNC: 123 U/L (ref 0–45)
AST SERPL W P-5'-P-CCNC: 86 U/L (ref 0–40)
BILIRUB DIRECT SERPL-MCNC: 0.1 MG/DL
BILIRUB SERPL-MCNC: 0.4 MG/DL (ref 0–1)
C TRACH DNA SPEC QL PROBE+SIG AMP: NEGATIVE
HBV SURFACE AG SERPL QL IA: NEGATIVE
N GONORRHOEA DNA SPEC QL NAA+PROBE: NEGATIVE
PROT SERPL-MCNC: 7.6 G/DL (ref 6–8)

## 2021-01-25 LAB
HPV SOURCE: NORMAL
HUMAN PAPILLOMA VIRUS 16 DNA: NEGATIVE
HUMAN PAPILLOMA VIRUS 18 DNA: NEGATIVE
HUMAN PAPILLOMA VIRUS FINAL DIAGNOSIS: NORMAL
HUMAN PAPILLOMA VIRUS OTHER HR: NEGATIVE
SPECIMEN DESCRIPTION: NORMAL

## 2021-02-01 ENCOUNTER — COMMUNICATION - HEALTHEAST (OUTPATIENT)
Dept: FAMILY MEDICINE | Facility: CLINIC | Age: 31
End: 2021-02-01

## 2021-02-01 LAB
BKR LAB AP ABNORMAL BLEEDING: NO
BKR LAB AP BIRTH CONTROL/HORMONES: NORMAL
BKR LAB AP CERVICAL APPEARANCE: NORMAL
BKR LAB AP GYN ADEQUACY: NORMAL
BKR LAB AP GYN INTERPRETATION: NORMAL
BKR LAB AP GYN OTHER FINDINGS: NORMAL
BKR LAB AP HPV REFLEX: NORMAL
BKR LAB AP LMP: NORMAL
BKR LAB AP PATIENT STATUS: NORMAL
BKR LAB AP PREVIOUS ABNORMAL: NORMAL
BKR LAB AP PREVIOUS NORMAL: NORMAL
HIGH RISK?: NO
PATH REPORT.COMMENTS IMP SPEC: NORMAL
RESULT FLAG (HE HISTORICAL CONVERSION): NORMAL

## 2021-04-28 ENCOUNTER — OFFICE VISIT - HEALTHEAST (OUTPATIENT)
Dept: FAMILY MEDICINE | Facility: CLINIC | Age: 31
End: 2021-04-28

## 2021-04-28 DIAGNOSIS — E11.9 TYPE 2 DIABETES MELLITUS WITHOUT COMPLICATION, WITHOUT LONG-TERM CURRENT USE OF INSULIN (H): ICD-10-CM

## 2021-04-28 DIAGNOSIS — E66.811 CLASS 1 OBESITY DUE TO EXCESS CALORIES WITH SERIOUS COMORBIDITY AND BODY MASS INDEX (BMI) OF 30.0 TO 30.9 IN ADULT: ICD-10-CM

## 2021-04-28 DIAGNOSIS — E66.09 CLASS 1 OBESITY DUE TO EXCESS CALORIES WITH SERIOUS COMORBIDITY AND BODY MASS INDEX (BMI) OF 30.0 TO 30.9 IN ADULT: ICD-10-CM

## 2021-04-28 DIAGNOSIS — R74.8 ELEVATED LIVER ENZYMES: ICD-10-CM

## 2021-04-28 DIAGNOSIS — B37.31 YEAST INFECTION OF THE VAGINA: ICD-10-CM

## 2021-04-28 LAB
ALBUMIN SERPL-MCNC: 3.9 G/DL (ref 3.5–5)
ALP SERPL-CCNC: 106 U/L (ref 45–120)
ALT SERPL W P-5'-P-CCNC: 104 U/L (ref 0–45)
ANION GAP SERPL CALCULATED.3IONS-SCNC: 13 MMOL/L (ref 5–18)
AST SERPL W P-5'-P-CCNC: 88 U/L (ref 0–40)
BILIRUB DIRECT SERPL-MCNC: 0.2 MG/DL
BILIRUB SERPL-MCNC: 0.6 MG/DL (ref 0–1)
BUN SERPL-MCNC: 14 MG/DL (ref 8–22)
CALCIUM SERPL-MCNC: 9.3 MG/DL (ref 8.5–10.5)
CHLORIDE BLD-SCNC: 102 MMOL/L (ref 98–107)
CO2 SERPL-SCNC: 20 MMOL/L (ref 22–31)
CREAT SERPL-MCNC: 0.71 MG/DL (ref 0.6–1.1)
GFR SERPL CREATININE-BSD FRML MDRD: >60 ML/MIN/1.73M2
GLUCOSE BLD-MCNC: 234 MG/DL (ref 70–125)
HBA1C MFR BLD: 8.9 %
POTASSIUM BLD-SCNC: 4.2 MMOL/L (ref 3.5–5)
PROT SERPL-MCNC: 7.6 G/DL (ref 6–8)
SODIUM SERPL-SCNC: 135 MMOL/L (ref 136–145)

## 2021-04-28 ASSESSMENT — MIFFLIN-ST. JEOR: SCORE: 1316.92

## 2021-04-29 ENCOUNTER — COMMUNICATION - HEALTHEAST (OUTPATIENT)
Dept: FAMILY MEDICINE | Facility: CLINIC | Age: 31
End: 2021-04-29

## 2021-05-06 ENCOUNTER — RECORDS - HEALTHEAST (OUTPATIENT)
Dept: ADMINISTRATIVE | Facility: OTHER | Age: 31
End: 2021-05-06

## 2021-05-06 LAB — RETINOPATHY: NEGATIVE

## 2021-05-11 ENCOUNTER — RECORDS - HEALTHEAST (OUTPATIENT)
Dept: HEALTH INFORMATION MANAGEMENT | Facility: CLINIC | Age: 31
End: 2021-05-11

## 2021-05-26 ENCOUNTER — OFFICE VISIT - HEALTHEAST (OUTPATIENT)
Dept: FAMILY MEDICINE | Facility: CLINIC | Age: 31
End: 2021-05-26

## 2021-05-26 DIAGNOSIS — E11.9 TYPE 2 DIABETES MELLITUS WITHOUT COMPLICATION, WITHOUT LONG-TERM CURRENT USE OF INSULIN (H): ICD-10-CM

## 2021-05-26 LAB — HBA1C MFR BLD: 8.8 %

## 2021-05-26 RX ORDER — GLIPIZIDE 5 MG/1
5 TABLET ORAL
Qty: 180 TABLET | Refills: 0 | Status: SHIPPED | OUTPATIENT
Start: 2021-05-26 | End: 2021-08-09

## 2021-05-27 NOTE — PROGRESS NOTES
Subjective:  28 y.o. female with concerns of follow up on diabetes.  Denies concerns.  Takes metformin without side effects.  No excess thirst or urination    Contraception with IUD.  Menses not for 1.5 months.  Does not think she is pregnant.  Not breast feeding.    Complains of white discharge and itching.  Does not want vaginal exam.  Does not think at risk for STD.    Outpatient Medications Prior to Visit   Medication Sig Dispense Refill     metFORMIN (GLUCOPHAGE) 500 MG tablet Take 1 tablet (500 mg total) by mouth 2 (two) times a day with meals. 180 tablet 3     ibuprofen (ADVIL,MOTRIN) 600 MG tablet Take 1 tablet (600 mg total) by mouth every 6 (six) hours as needed for pain. 30 tablet 2     No facility-administered medications prior to visit.       Social History     Tobacco Use   Smoking Status Never Smoker   Smokeless Tobacco Never Used      Objective:  /70 (Patient Site: Right Arm, Patient Position: Sitting, Cuff Size: Adult Regular)   Pulse 100   Temp 98.5  F (36.9  C) (Oral)   Wt 154 lb (69.9 kg)   BMI 31.10 kg/m    GENERAL: alert, not distressed  CHEST: clear, no rales, rhonchi, or wheezes  CARDIAC: regular without murmur, gallop, or rub  ABDOMEN: soft, non tender, non distended, normal bowel sounds    Recent Results (from the past 24 hour(s))   Glycosylated Hemoglobin A1c   Result Value Ref Range    Hemoglobin A1c 6.6 (H) 3.5 - 6.0 %   Pregnancy (Beta-hCG, Qual), Urine   Result Value Ref Range    Pregnancy Test, Urine Negative Negative      Assessment and Plan:   1. Yeast infection of the vagina  Discussed empiric treatment.  Follow up soon if not better.  - miconazole (MICONAZOLE 7) 2 % vaginal cream; Insert 1 applicator into the vagina at bedtime for 7 doses.  Dispense: 45 g; Refill: 0    2. Type 2 diabetes mellitus without complication, without long-term current use of insulin (H)  Good control  Continue metformin  - Glycosylated Hemoglobin A1c  - Thyroid Stimulating Hormone (TSH)  -  Testosterone, Free and Total  - Basic Metabolic Panel    3. Irregular menses  Question of PCOS vs. DM effect.  Further workup for hyperandrogenism.  - Prolactin  - Pregnancy (Beta-hCG, Qual), Urine  - Dehydroepiandrosterone Sulfate, Serum (DHEAS)

## 2021-05-28 ENCOUNTER — AMBULATORY - HEALTHEAST (OUTPATIENT)
Dept: NURSING | Facility: CLINIC | Age: 31
End: 2021-05-28

## 2021-05-28 ASSESSMENT — ASTHMA QUESTIONNAIRES
ACT_TOTALSCORE: 12
ACT_TOTALSCORE: 20
ACT_TOTALSCORE: 22
ACT_TOTALSCORE: 15

## 2021-05-31 VITALS — WEIGHT: 151 LBS | HEIGHT: 58 IN | BODY MASS INDEX: 31.7 KG/M2

## 2021-05-31 VITALS — WEIGHT: 155 LBS | BODY MASS INDEX: 32.12 KG/M2

## 2021-05-31 VITALS — WEIGHT: 159 LBS | BODY MASS INDEX: 32.95 KG/M2

## 2021-05-31 VITALS — WEIGHT: 151 LBS | BODY MASS INDEX: 31.83 KG/M2

## 2021-06-01 ENCOUNTER — COMMUNICATION - HEALTHEAST (OUTPATIENT)
Dept: FAMILY MEDICINE | Facility: CLINIC | Age: 31
End: 2021-06-01

## 2021-06-01 VITALS — BODY MASS INDEX: 34.81 KG/M2 | WEIGHT: 168 LBS

## 2021-06-01 VITALS — HEIGHT: 58 IN | BODY MASS INDEX: 36.06 KG/M2 | WEIGHT: 171.8 LBS

## 2021-06-01 VITALS — WEIGHT: 157 LBS | BODY MASS INDEX: 31.71 KG/M2

## 2021-06-01 VITALS — WEIGHT: 174.6 LBS | HEIGHT: 58 IN | BODY MASS INDEX: 36.65 KG/M2

## 2021-06-01 VITALS — WEIGHT: 172 LBS | BODY MASS INDEX: 35.64 KG/M2

## 2021-06-01 VITALS — WEIGHT: 169 LBS | BODY MASS INDEX: 35.02 KG/M2

## 2021-06-01 VITALS — BODY MASS INDEX: 35.33 KG/M2 | WEIGHT: 170.5 LBS

## 2021-06-01 VITALS — HEIGHT: 58 IN | BODY MASS INDEX: 36.53 KG/M2 | WEIGHT: 174 LBS

## 2021-06-01 VITALS — BODY MASS INDEX: 35.41 KG/M2 | WEIGHT: 170.9 LBS

## 2021-06-01 VITALS — WEIGHT: 164 LBS | BODY MASS INDEX: 33.98 KG/M2

## 2021-06-01 VITALS — WEIGHT: 184 LBS | BODY MASS INDEX: 38.13 KG/M2

## 2021-06-01 VITALS — WEIGHT: 170.7 LBS | BODY MASS INDEX: 35.37 KG/M2

## 2021-06-01 VITALS — WEIGHT: 169.6 LBS | HEIGHT: 58 IN | BODY MASS INDEX: 35.6 KG/M2

## 2021-06-01 VITALS — WEIGHT: 167.5 LBS | BODY MASS INDEX: 35.16 KG/M2 | HEIGHT: 58 IN

## 2021-06-01 VITALS — BODY MASS INDEX: 37.75 KG/M2 | WEIGHT: 182.2 LBS

## 2021-06-01 VITALS — BODY MASS INDEX: 34.25 KG/M2 | HEIGHT: 59 IN | WEIGHT: 169.9 LBS

## 2021-06-01 VITALS — BODY MASS INDEX: 37.76 KG/M2 | WEIGHT: 179.9 LBS | HEIGHT: 58 IN

## 2021-06-01 VITALS — BODY MASS INDEX: 35.64 KG/M2 | WEIGHT: 172 LBS

## 2021-06-01 NOTE — PROGRESS NOTES
Subjective:    Jami Prince is a 29 y.o. female who presents for evaluation of concern for asthma.  She is worried she may have asthma, she has had a hard time breathing for 1 month.  She is coughing a hard time breathing, can happen anytime during the day, though nighttime seems to be worse.  No fevers.  She says she has had dreams where she has had trouble breathing, then she wakes up and has trouble breathing.  She is used a friend's albuterol neb machine and says that does help her symptoms.  Overall she feels like she might be getting worse.  Has not used any other new medicines other than the neb machine.  She thinks she may have had asthma as a young child.  She is not breast-feeding.  She has IUD for contraception.  No known TB contacts.  No hemoptysis.  No chest pain.  She took a trip to [place] prior to symptoms starting, flying.  No significant smoke exposure.    Patient Active Problem List   Diagnosis     Type 2 diabetes mellitus without complication, without long-term current use of insulin (H)     Class 1 obesity due to excess calories with serious comorbidity and body mass index (BMI) of 30.0 to 30.9 in adult     Elevated liver enzymes       Current Outpatient Medications:      metFORMIN (GLUCOPHAGE) 500 MG tablet, Take 1 tablet (500 mg total) by mouth 2 (two) times a day with meals., Disp: 180 tablet, Rfl: 3     azithromycin (ZITHROMAX Z-JIGAR) 250 MG tablet, Take 2 tablets (500 mg) on  Day 1,  Then take 1 tablet (250 mg) once daily on Days 2 through 5., Disp: 6 tablet, Rfl: 0     ibuprofen (ADVIL,MOTRIN) 600 MG tablet, Take 1 tablet (600 mg total) by mouth every 6 (six) hours as needed for pain., Disp: 30 tablet, Rfl: 2     predniSONE (DELTASONE) 10 mg tablet, Take 40 mg by mouth daily for 5 days., Disp: 20 tablet, Rfl: 0     Objective:   Allergies:  Patient has no known allergies.    Vitals:  Vitals:    09/24/19 1047   BP: 106/78   Patient Site: Left Arm   Patient Position: Sitting   Cuff Size: Adult  Regular   Pulse: 98   Resp: 20   Weight: 150 lb 4 oz (68.2 kg)     Body mass index is 30.35 kg/m .    Vital signs reviewed.  General: Patient is alert and oriented x 3, in no apparent distress  Ears: TMs are non-erythematous with good light reflex bilaterally  Throat: no erythema, edema or exudate noted  Lymphatic: no anterior cervical lymph node enlargement  Cardiac: regular rate and rhythm, no murmurs  Pulmonary: Left lung is clear to auscultation, right lung has mild to moderate wheezing throughout, possible crackles in the middle lobe  Musculoskeletal: No lower extremity edema bilaterally, no swelling in the calves bilaterally, no pain with palpation of calves bilaterally, negative Homans sign bilaterally    Recent Results (from the past 24 hour(s))   Glycosylated Hemoglobin A1c    Collection Time: 09/24/19 11:08 AM   Result Value Ref Range    Hemoglobin A1c 6.2 (H) 3.5 - 6.0 %         Assessment and Plan:   1.  Cough and shortness of breath.  Differential includes URI, bronchitis, pneumonia, versus other pulmonary issue.  Vital signs today reassuring.  No red flags or significant risks for PE: no estrogen use, no recent bedrest or long flight/car rides.  We neglected to get her O2 before she left.  No audible wheezing today.  Prescription sent for azithromycin and prednisone.  I reviewed appropriate use with her.  She will follow-up in 1 week if no better, sooner if worsening.    2.  Type 2 diabetes.  She is due for A1c check.  That was drawn today.  I will send results to her PCP for follow-up.    This dictation uses voice recognition software, which may contain typographical errors.

## 2021-06-01 NOTE — TELEPHONE ENCOUNTER
MIESHAI : Used the language [employer] [place]  ID # [id number]. Per pt said she is doing better with the meds that was prescribed to her. Thanks.

## 2021-06-01 NOTE — TELEPHONE ENCOUNTER
Please call patient and see how her cough and breathing are doing.  Better?  Same?   Worse?    thanks

## 2021-06-01 NOTE — TELEPHONE ENCOUNTER
----- Message from Wai Macias MD sent at 10/2/2019  4:46 PM CDT -----  Call:  Good control of blood sugars/diabetes.

## 2021-06-02 VITALS — WEIGHT: 154 LBS | BODY MASS INDEX: 31.1 KG/M2

## 2021-06-02 VITALS — WEIGHT: 156 LBS | BODY MASS INDEX: 31.51 KG/M2

## 2021-06-02 VITALS — WEIGHT: 156.25 LBS | BODY MASS INDEX: 31.56 KG/M2

## 2021-06-02 VITALS — BODY MASS INDEX: 31.6 KG/M2 | WEIGHT: 156.75 LBS | HEIGHT: 59 IN

## 2021-06-02 NOTE — PROGRESS NOTES
Subjective:  29 y.o. female with concerns continued difficulty breathing.  Thinks this is worse with exercise.  Does not feel like she has much cough but feels like she wants to cough something out.  Does have some wheezing.  She took azithromycin and prednisone a week or 2 ago.  She states she felt better for about 2 days and then was back to her baseline.  Right now she feels relatively normal.    Outpatient Medications Prior to Visit   Medication Sig Dispense Refill     ibuprofen (ADVIL,MOTRIN) 600 MG tablet Take 1 tablet (600 mg total) by mouth every 6 (six) hours as needed for pain. 30 tablet 2     metFORMIN (GLUCOPHAGE) 500 MG tablet Take 1 tablet (500 mg total) by mouth 2 (two) times a day with meals. 180 tablet 3     azithromycin (ZITHROMAX Z-JIGAR) 250 MG tablet Take 2 tablets (500 mg) on  Day 1,  Then take 1 tablet (250 mg) once daily on Days 2 through 5. 6 tablet 0     No facility-administered medications prior to visit.       Social History     Tobacco Use   Smoking Status Never Smoker   Smokeless Tobacco Current User   Tobacco Comment    BETTLE NUT      Objective:  /70 (Patient Site: Left Arm, Patient Position: Sitting, Cuff Size: Adult Regular)   Pulse 96   Temp 98.7  F (37.1  C) (Oral)   Wt 151 lb (68.5 kg)   LMP 09/22/2019   SpO2 98%   BMI 30.50 kg/m     GENERAL: alert, not distressed  EARS: normal tympanic membranes and external auditory canals bilaterally  PHARYNX: no erythema or exudates  MOUTH: well hydrated mucosa, no lesions  NECK: no lymphadenopathy or thyroid nodules  CHEST: clear, no rales, rhonchi, or wheezes   There was a short wheeze with rapid forced expiration.  CARDIAC: regular without murmur, gallop, or rub  ABDOMEN: soft, non tender, non distended, normal bowel sounds    Assessment and Plan:   1. Wheezing  Discussed possible obstructive symptoms.  Would try a beta agonist over-the-counter as needed.  Inhaler technique discussed.  If not improved would recommend  spirometry.  - albuterol (PROAIR HFA;PROVENTIL HFA;VENTOLIN HFA) 90 mcg/actuation inhaler; Inhale 2 puffs every 6 (six) hours as needed for wheezing.  Dispense: 1 each; Refill: 0    2. Need for immunization against influenza  - Influenza, Seasonal Quad, PF =/> 6months

## 2021-06-02 NOTE — TELEPHONE ENCOUNTER
" The message was relayed to the patient. Language line was used, 's ID # is [id number].\" No further questions.    "

## 2021-06-03 VITALS
OXYGEN SATURATION: 98 % | SYSTOLIC BLOOD PRESSURE: 110 MMHG | HEART RATE: 96 BPM | WEIGHT: 151 LBS | TEMPERATURE: 98.7 F | BODY MASS INDEX: 30.5 KG/M2 | DIASTOLIC BLOOD PRESSURE: 70 MMHG

## 2021-06-03 VITALS
DIASTOLIC BLOOD PRESSURE: 78 MMHG | HEART RATE: 98 BPM | RESPIRATION RATE: 20 BRPM | BODY MASS INDEX: 30.35 KG/M2 | WEIGHT: 150.25 LBS | SYSTOLIC BLOOD PRESSURE: 106 MMHG

## 2021-06-04 VITALS
HEART RATE: 103 BPM | SYSTOLIC BLOOD PRESSURE: 125 MMHG | DIASTOLIC BLOOD PRESSURE: 82 MMHG | HEIGHT: 58 IN | WEIGHT: 162 LBS | BODY MASS INDEX: 34 KG/M2

## 2021-06-05 VITALS
HEIGHT: 58 IN | SYSTOLIC BLOOD PRESSURE: 122 MMHG | RESPIRATION RATE: 14 BRPM | HEART RATE: 103 BPM | TEMPERATURE: 97.9 F | WEIGHT: 158 LBS | DIASTOLIC BLOOD PRESSURE: 83 MMHG | BODY MASS INDEX: 33.17 KG/M2 | OXYGEN SATURATION: 99 %

## 2021-06-05 VITALS
SYSTOLIC BLOOD PRESSURE: 125 MMHG | WEIGHT: 160 LBS | HEART RATE: 103 BPM | DIASTOLIC BLOOD PRESSURE: 86 MMHG | BODY MASS INDEX: 33.15 KG/M2

## 2021-06-07 NOTE — PROGRESS NOTES
Chart reviewed by Ambulatory Quality and Data team    Please abstract the following data from this visit with this patient into the appropriate field in Epic:    Tests that can be patient reported without a hard copy:        Other Tests found in the patient's chart through Chart Review/Care Everywhere:    Eye exam with ophthalmology on this date: 2/6/2019 with documentation of no diabetic retinopathy.     Note to Abstraction: If this section is blank, no results were found via Chart Review/Care Everywhere.

## 2021-06-08 NOTE — TELEPHONE ENCOUNTER
Coronavirus (COVID-19) Notification    Patient  Jami Prince    Reason for call  Notify of Positive Coronavirus (COVID-19) lab results, assess symptoms,  review [facility] recommendations    Lab Result    Lab test:  2019-nCoV rRt-PCR or SARS-CoV-2 PCR    Oropharyngeal AND/OR nasopharyngeal swabs is POSITIVE for 2019-nCoV RNA/SARS-COV-2 PCR (COVID-19 virus)    RN Recommendations/Instructions per [facility] Coronavirus COVID-19 recommendations    Brief introduction script  Hi, My name is Aretha and I am calling on behalf of [employer].  We were notified that your Coronavirus test (COVID-19) for was POSITIVE for the virus.  I have some information to relay to you but first I wanted to mention that the [place] Dept of Health will be contacting you shortly [it's possible MD already called Patient] to talk to you more about how you are feeling and other people you have had contact with who might now also have the virus.  Also, [facility] is Partnering with the [facility] for Covid-19 research, you may be contacted directly by research staff.    Assessment (Inquire about Patient's current symptoms)  Patient reports feeling tired, fever, cough and headache.  Her symptoms started Saturday 5/16/2020.  While talking with patient, children heard in the background and patient states other family members also live with her.    If at time of call, Patients symptoms hare worsened, the Patient should contact 911 or have someone drive them to Emergency Dept promptly:      If Patient calling 911, inform 911 personal that you have tested positive for the Coronavirus (COVID-19).  Place mask on and await 911 to arrive.    If Emergency Dept, If possible, please have another adult drive you to the Emergency Dept but you need to wear mask when in contact with other people.      Review information with Patient    Since you tested POSITIVE for the COVID-19 virus, it is important that you protect others from being  exposed and infected with this virus.    [For safety, it's very important to follow these rules.]    First, stay home and away from others (self-isolate) until:    You've had no fever--and no medicine that reduces fever--for 3 full days (72 hours). And      Your other symptoms have gotten better. For example, your cough or breathing has improved. And     At least 10 days have passed since your symptoms started.    During this time:    Stay in your own room (and use your own bathroom), if you can.    Stay away from others in your home. No hugging, kissing or shaking hands.    Don't let anyone visit.    Don't go to work, school or anywhere else.     Clean  high touch  surfaces often (doorknobs, counters, handles, etc.). Use a household cleaning spray or wipes.    Cover your mouth and nose with a mask, tissue or washcloth to avoid spreading germs.    Wash your hands and face often with soap and water.    You should not go back to work until you meet the guidelines above for ending your home isolation. You should meet these along with any other guidelines that your employer has.  Employers: This document serves as formal notice of your employee's medical guidelines for going back to work. They must meet the above guidelines before going back to work in person.    How can I take care of myself?  1. Get lots of rest. Drink extra fluids (unless a doctor has told you not to).    2. Take Tylenol (acetaminophen) for fever or pain. If you have liver or kidney problems, ask your family doctor if it's okay to take Tylenol.     Take either:     650 mg (two 325 mg pills) every 4 to 6 hours, or     1,000 mg (two 500 mg pills) every 8 hours as needed.     Note: Don't take more than 3,000 mg in one day. Acetaminophen is found in many medicines (both prescribed and over-the-counter medicines). Read all labels to be sure you don't take too much.  For children, check the Tylenol bottle for the right dose. The dose is based on the  child's age or weight.  3. If you have other health problems (like cancer, heart failure, an organ transplant or severe kidney disease): Call your specialty clinic if you don't feel better in the next 2 days.    4. Know when to call 911: If your breathing is so bad that it keeps you from doing normal activities, call 911 or go to the emergency room. Tell them that you've been staying home and may have COVID-19.  5. Sign up for [employer] We know it's scary to hear that you have COVID-19. We want to track your symptoms to make sure you're okay over the next 2 weeks. Please look for an email from [employer]--this is a free, online program that we'll use to keep in touch. To sign up, follow the link in the email. Learn more at [email or web address].    Where can I get more information?    To learn the [place]'s guidelines for staying home, please visit the [facility] website at [email or web address]/[email or web address].    To learn more about COVID-19 and how to care for yourself at home, please visit the CDC website at https://www.cdc.gov/coronavirus/2019-ncov/about/steps-when-sick.html.    For more options for care at [facility], please visit our website at [email or web address]      [place] Dept of Health ([facility]) COVID-19 Hotline:   [phone]    Positive COVID-19 letter Sent (Yes/Lelo):  yes      Aretha Auguste RN

## 2021-06-08 NOTE — TELEPHONE ENCOUNTER
and patient calling.  States cough and tightness in chest when coughs is due to asthma.  Prescribed asthma medication is not working and would like to speak with PCP.  Transferred to scheduling for Telephone Visit with PCP/Clinic.    Hien Perera RN  [facility] Triage Nurse Advisor    Reason for Disposition    Asthma medicine (nebulizer or inhaler) is needed more frequently than every 4 hours to keep you comfortable    Protocols used: ASTHMA ATTACK-A-OH

## 2021-06-08 NOTE — PROGRESS NOTES
"Jami Prince is a 30 y.o. female who is being evaluated via a billable telephone visit.      The patient has been notified of following:     \"This telephone visit will be conducted via a call between you and your physician/provider. We have found that certain health care needs can be provided without the need for a physical exam.  This service lets us provide the care you need with a short phone conversation.  If a prescription is necessary we can send it directly to your pharmacy.  If lab work is needed we can place an order for that and you can then stop by our lab to have the test done at a later time.    Telephone visits are billed at different rates depending on your insurance coverage. During this emergency period, for some insurers they may be billed the same as an in-person visit.  Please reach out to your insurance provider with any questions.    If during the course of the call the physician/provider feels a telephone visit is not appropriate, you will not be charged for this service.\"    Patient has given verbal consent to a Telephone visit? Yes    What phone number would you like to be contacted at? [phone]    Use  line to contact : Fran    Patient would like to receive their AVS by AVS Preference: Mail a copy.    Additional provider notes:   30 y.o. having telephone visit for follow-up on positive COVID testing.  Patient's test was positive on May 20.  She estimates she had symptoms for 5 days before being tested.  She has no household contacts.  Her family has remained healthy.  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  Work has been informed of her illness and midcarpal members have been advised to stay at home as well.    Symptoms started with feelings of fevers, headaches, tiredness, and muscle aches.  She did not have any cough.  She did lose sense of smell.  Her appetite was poor.  Most things continue to be getting better.  Appetite is still somewhat off.  She still " has some headache.  She has used Tylenol and ibuprofen for that.  Usually she is used 200 mg of ibuprofen.  We discussed that she could take up to 600 mg 3 times per day.  She has a history of asthma and is feeling like her breathing is okay.  Inhalers have helped as needed.  She does feel she is getting past the worst of her illness.    Patient has a history of diabetes.  She has not been checking her blood sugars.  She is due for follow-up.    She has some concerns about needing a letter for her workplace.  She needs a letter to confirm that she did have coronavirus.  It appears that letter has been generated and is hopefully been sent.  If she does not receive the letter I have instructed her to call me back next week and will resend it.    She was again advised of the need to stay home for 72 hours after her symptoms have completely resolved without use of medicines to improve them.    Assessment/Plan:  1. COVID-19  - COVID-19 Get[facility] Loop Referral     This visit was conducted with the aid of a professional .     Phone call duration: 14  minutes    Wai Macias MD

## 2021-06-08 NOTE — PATIENT INSTRUCTIONS - HE
As you recover from COVID-19    Patients who have symptoms (cough, fever, or shortness of breath), need to isolate for 10 days from when symptoms started AND 72 hours after fever resolves (without fever reducing medications) AND improvement of respiratory symptoms (whichever is longer).    During this time:    Isolate yourself at home (in own room/own bathroom if possible)    Do not allow any visitors    Do not go to work or school    Do not go to [employer],  centers, shopping, or other public places    Do not shake hands    Avoid close and intimate contact with others (hugging, kissing)    Follow CDC recommendations for household cleaning of frequently touched services    After the initial 10 days, continue to isolate yourself from household members as much as possible. To continue decrease the risk of community spread and exposure, you and any members of your household should limit activities in public for 14 days after starting home isolation.     You can reference the following CDC link for helpful home isolation/care tips:  https://www.cdc.gov/coronavirus/2019-ncov/downloads/10Things.pdf    Protect Others:    Cover your mouth and nose with a mask, disposable tissue or wash cloth to avoid spreading germs.    Wash your hands and face often. Use soap and water.    Call Back If: Breathing difficulty develops or you become worse.      For more information about COVID19 and options for caring for yourself at home, please visit the CDC website at https://www.cdc.gov/coronavirus/2019-ncov/about/steps-when-sick.html  For more options for care at [facility], please visit our website at [email or web address]    For information about [facility] COVID-19 Clinical Trials, please visit [email or web address]  For more information, please use the [place] Department of Health COVID-19 Website:  https://www.health.[facility].[place]/diseases/coronavirus/index.html  [place] Department of Health ([facility]) COVID-19 Hotlines (Interpreters  available):      Health questions: Phone Number: [phone] or 1-169.549.5262 and Hours: 7 a.m. to 7 p.m.    Schools and  questions: Phone Number: [phone] or 1-203.187.1562 and Hours 7 a.m. to 7 p.m.      Coping with Life After COVID-19  Being in the hospital because of COVID-19 is scary. Going home can be scary, too. You may face changes to your life, the way you work or what you can eat. It s hard to adjust to change, and it s normal to feel afraid, frustrated or even angry. These feelings usually go away over time. If your feelings don t start to get better, it s called  adjustment disorder.      What signs should I look out for?  Adjustment disorder can happen to anyone in a time of stress. It makes it hard to cope with daily life. You may feel lonely or fight with loved ones, even if you re glad to be home. Watch for these signs:    Fear or worry    Hard time focusing    Sadness or anger    Trouble talking to family or friends    Feeling like you don t fit in or isolating yourself    Problems with sleep     Drinking alcohol or taking drugs to cope    What can I do?  You can help yourself get better. Feeling you have control helps you move forward. You may wonder if you ll be able to do things you did before. Be patient. Do your best to make the most of every day. Try to build relationships, be as active as you can, eat right and keep a sense of humor. Avoid smoking and drinking too much alcohol. Call your family doctor or clinic if you re not sure what to do. They can guide you to care or other services.    When should I get help?  Think about getting counseling if your sadness or frustration gets worse. Together with a trained counselor, you can talk about your problems adjusting to life after your hospital stay. You can come up with new ways to handle changes  that give you more control. Your family doctor or care team can help you find a counselor.     Get help if you re thinking about hurting yourself. If you need help right away, call 911 or go to the nearest emergency room. You can also try the Crisis Text Line.    Crisis Text Line: 995-389 (http://www.crisistextline.org)  The Crisis Text Line serves anyone, in any crisis. It gives free, 24/7 support. Here's how it works:  1. Text HOME to 811403 from anywhere in the [place], anytime, about any type of crisis.  2. A live, trained Crisis Counselor will text you back quickly.  3. The volunteer Crisis Counselor can help you move from a  hot moment  to a  cool moment.  They can also help you work out a safety plan.

## 2021-06-08 NOTE — PROGRESS NOTES
"Jami Prince is a 29 y.o. female who is being evaluated via a billable telephone visit.      The patient has been notified of following:     \"This telephone visit will be conducted via a call between you and your physician/provider. We have found that certain health care needs can be provided without the need for a physical exam.  This service lets us provide the care you need with a short phone conversation.  If a prescription is necessary we can send it directly to your pharmacy.  If lab work is needed we can place an order for that and you can then stop by our lab to have the test done at a later time.    Telephone visits are billed at different rates depending on your insurance coverage. During this emergency period, for some insurers they may be billed the same as an in-person visit.  Please reach out to your insurance provider with any questions.    If during the course of the call the physician/provider feels a telephone visit is not appropriate, you will not be charged for this service.\"    Patient has given verbal consent to a Telephone visit? Yes    What phone number would you like to be contacted at? [phone]    Patient would like to receive their AVS by AVS Preference: Mail a copy.    Additional provider notes:   Patient calling with 1 week of symptoms of coughing and wheezing.  Denies sick contacts at home or work.  Has rhinorrhea without nose itching.  Does acknowledge anosmia.  Denies nausea, vomiting, diarrhea.  Denies fever.  Cough is productive of yellow sputum.  She feels some shortness of breath.  Does feel better when albuterol inhaler is used.  Denies recurrent phenomenon like this during the spring.  She is supposed to work later in the week.      Tobacco Use      Smoking status: Never Smoker      Smokeless tobacco: Current User      Tobacco comment: BETTLE NUT    Able to talk on the phone without significant shortness of breath.  Alert oriented and conversive.    Assessment/Plan:  1. " Wheezing  By history this does sound like an asthma exacerbation.  She has had them in the past and been successful with prednisone and albuterol.  We discussed that since she is having outbreaks occasionally the recommendation for a controller medicine is provided.  Fluticasone and discussion of how to use it.  Acutely will treat with albuterol and oral prednisone.  I recommended testing for COVID-19.  She declined to have the drive-through testing arranged.  She did agree that if she were to develop a fever she would call back.    - predniSONE (DELTASONE) 20 MG tablet; Take 40 mg by mouth daily for 5 days.  Dispense: 10 tablet; Refill: 0  - albuterol (PROAIR HFA;PROVENTIL HFA;VENTOLIN HFA) 90 mcg/actuation inhaler; Inhale 2 puffs every 6 (six) hours as needed for wheezing.  Dispense: 1 each; Refill: 2  - fluticasone propionate (FLOVENT HFA) 220 mcg/actuation inhaler; Inhale 2 puffs 2 (two) times a day.  Dispense: 1 Inhaler; Refill: 2    This visit was conducted with the aid of a professional .     Phone call duration:  12 minutes    Wai Macias MD

## 2021-06-10 NOTE — PROGRESS NOTES
Subjective:  30 y.o. female with concerns follow-up on asthma and on diabetes.  Thinks she is feeling okay.  Does not feel breathing is well controlled.  See ACT below.    Not checking blood sugars.  Not much for exercise.  Unsure of her diet as far as carbohydrate intake.  Does not have polydipsia or polyuria.    Asthma Control Test:  In the past 4 weeks, how much of the time did your asthma keep you from getting as much done at work, school, or at home?: 3 (8/22/2020  2:00 PM)  During the past 4 weeks, how often have you had shortness of breath?: 3 (8/22/2020  2:00 PM)  During the past 4 weeks, how often did your asthma symptoms (wheezing, coughing, shortness of breath, chest tightness or pain) wake you up at night or earlier in the morning?: 5 (8/22/2020  2:00 PM)  During the past 4 weeks, how often have you used your rescue inhaler or nebulizer medication (such as albuterol)?: 2 (8/22/2020  2:00 PM)  How would you rate your asthma control during the past 4 weeks?: 2 (8/22/2020  2:00 PM)  ACT Total Score: (!) 15 (8/22/2020  2:00 PM)  In the past 12 months, have you visited the emergency room due to your asthma?: No (8/22/2020  2:00 PM)  In the past 12 months, have you been hospitalized due to your asthma?: No (8/22/2020  2:00 PM)       Outpatient Medications Prior to Visit   Medication Sig Dispense Refill     albuterol (PROAIR HFA;PROVENTIL HFA;VENTOLIN HFA) 90 mcg/actuation inhaler Inhale 2 puffs every 6 (six) hours as needed for wheezing. 1 each 2     fluticasone propionate (FLOVENT HFA) 220 mcg/actuation inhaler Inhale 2 puffs 2 (two) times a day. 1 Inhaler 2     ibuprofen (ADVIL,MOTRIN) 600 MG tablet Take 1 tablet (600 mg total) by mouth every 6 (six) hours as needed for pain. 30 tablet 2     metFORMIN (GLUCOPHAGE) 500 MG tablet Take 1 tablet (500 mg total) by mouth 2 (two) times a day with meals. 180 tablet 3     No facility-administered medications prior to visit.       Social History     Tobacco Use  "  Smoking Status Never Smoker   Smokeless Tobacco Current User   Tobacco Comment    BETTLE NUT      Objective:  /82 (Patient Site: Left Arm, Patient Position: Sitting, Cuff Size: Adult Regular)   Pulse (!) 103   Ht 4' 10.25\" (1.48 m)   Wt 162 lb (73.5 kg)   LMP 08/17/2020   BMI 33.57 kg/m    GENERAL: alert, not distressed  CHEST: clear, no rales, rhonchi, or wheezes  CARDIAC: regular without murmur, gallop, or rub  ABDOMEN: soft, non tender, non distended, normal bowel sounds    Recent Results (from the past 240 hour(s))   Lipid Cascade    Collection Time: 08/19/20  4:06 PM   Result Value Ref Range    Cholesterol 213 (H) <=199 mg/dL    Triglycerides 372 (H) <=149 mg/dL    HDL Cholesterol 39 (L) >=50 mg/dL    LDL Calculated 100 <=129 mg/dL    Patient Fasting > 8hrs? No    Glycosylated Hemoglobin A1c    Collection Time: 08/19/20  4:06 PM   Result Value Ref Range    Hemoglobin A1c 8.0 (H) <=5.6 %   Hepatic Profile    Collection Time: 08/19/20  4:06 PM   Result Value Ref Range    Bilirubin, Total 0.2 0.0 - 1.0 mg/dL    Bilirubin, Direct <0.1 <=0.5 mg/dL    Protein, Total 7.2 6.0 - 8.0 g/dL    Albumin 3.6 3.5 - 5.0 g/dL    Alkaline Phosphatase 94 45 - 120 U/L     (H) 0 - 40 U/L     (H) 0 - 45 U/L   Basic Metabolic Panel    Collection Time: 08/19/20  4:06 PM   Result Value Ref Range    Sodium 138 136 - 145 mmol/L    Potassium 3.8 3.5 - 5.0 mmol/L    Chloride 103 98 - 107 mmol/L    CO2 25 22 - 31 mmol/L    Anion Gap, Calculation 10 5 - 18 mmol/L    Glucose 322 (H) 70 - 125 mg/dL    Calcium 8.9 8.5 - 10.5 mg/dL    BUN 11 8 - 22 mg/dL    Creatinine 0.75 0.60 - 1.10 mg/dL    GFR MDRD Af Amer >60 >60 mL/min/1.73m2    GFR MDRD Non Af Amer >60 >60 mL/min/1.73m2   Microalbumin, Random Urine    Collection Time: 08/19/20  4:14 PM   Result Value Ref Range    Microalbumin, Random Urine 1.82 0.00 - 1.99 mg/dL    Creatinine, Urine 39.2 mg/dL    Microalbumin/Creatinine Ratio Random Urine 46.4 (H) <=19.9 mg/g    "   Assessment and Plan:   1. Moderate persistent asthma without complication  Not currently controlled.  Discussed adding long-acting beta agonist to her steroid.  Use that as a controller medication and then add short acting as needed.  - budesonide-formoteroL (SYMBICORT) 160-4.5 mcg/actuation inhaler; Inhale 2 puffs 2 (two) times a day.  Dispense: 1 Inhaler; Refill: 12    2. Type 2 diabetes mellitus with unspecified complications (H)  3. Type 2 diabetes mellitus without complication, without long-term current use of insulin (H)  Uncontrolled.  Need to make adjustments.  Recommended increasing metformin idose to 1000 mg from the previous 500 two times a day   - Microalbumin, Random Urine  - Lipid Cascade  - Glycosylated Hemoglobin A1c  - Hepatic Profile  - Basic Metabolic Panel  - metFORMIN (GLUCOPHAGE) 1000 MG tablet; Take 1 tablet (1,000 mg total) by mouth 2 (two) times a day with meals.  Dispense: 180 tablet; Refill: 1    4. Elevated liver enzymes  Likely related to obesity and fatty liver infiltration.    Viral hepatitis screening has been normal in the past.  She is not immune to hepatitis B and could consider immunizations.

## 2021-06-10 NOTE — TELEPHONE ENCOUNTER
Called and spoke with Jami Prince , Message was given, Jami Prince  understood, no further questions.  Appointment made to come in.

## 2021-06-10 NOTE — TELEPHONE ENCOUNTER
----- Message from Wai Macias MD sent at 7/17/2020 12:36 PM CDT -----  Due for A1c.  Please schedule lab or clinic visit.

## 2021-06-12 NOTE — PROGRESS NOTES
Subjective:  27 y.o. female with concerns fatigue and dizziness for 2 months.  First child 19 months old.  No sad/depressed.  Has ruminating thoughts.  Sometimes hard to sleep.    ROS:   GEN: no fever chills, weight change  EYE; normal  ENT: normal  RESP: normal  CV: normal  GI: normal appetite, stools.  No blood.  : IUD, no excess bleeding.  SKIN: no rash, bruises.  ENDO: denies excessive thirst, urination.  No galactorrhea.  Not hot/cold.  NEURO: dizzy, no headaches, no deficits  PSYCH: as above.    Outpatient Medications Prior to Visit   Medication Sig Dispense Refill     ibuprofen (ADVIL,MOTRIN) 600 MG tablet Take 1 tablet (600 mg total) by mouth every 6 (six) hours as needed for pain. 30 tablet 0     No facility-administered medications prior to visit.       History   Smoking Status     Never Smoker   Smokeless Tobacco     Not on file      Objective:  /60 (Patient Site: Left Arm, Patient Position: Sitting, Cuff Size: Adult Regular)  Pulse 80  Temp 98.3  F (36.8  C) (Oral)   Wt 151 lb (68.5 kg)  Breastfeeding? No  BMI 31.83 kg/m2  GENERAL: alert, not distressed  EYES: PERRL/EOMI, no scleral icterus, no conjunctival injection  GENERAL: alert, not distressed  CHEST: clear, no rales, rhonchi, or wheezes  CARDIAC: regular without murmur  ABDOMEN: soft, non tender, non distended, normal bowel sounds  CHEST: clear, no rales, rhonchi, or wheezes  CARDIAC: regular without murmur  ABDOMEN: soft, non tender, non distended, normal bowel sounds    Recent Results (from the past 48 hour(s))   HM2(CBC w/o Differential)   Result Value Ref Range    WBC 8.1 4.0 - 11.0 thou/uL    RBC 5.54 (H) 3.80 - 5.40 mill/uL    Hemoglobin 14.7 12.0 - 16.0 g/dL    Hematocrit 44.9 35.0 - 47.0 %    MCV 81 80 - 100 fL    MCH 26.6 (L) 27.0 - 34.0 pg    MCHC 32.8 32.0 - 36.0 g/dL    RDW 10.9 (L) 11.0 - 14.5 %    Platelets 321 140 - 440 thou/uL    MPV 7.7 7.0 - 10.0 fL   Basic Metabolic Panel   Result Value Ref Range    Sodium 134 (L)  136 - 145 mmol/L    Potassium 3.9 3.5 - 5.0 mmol/L    Chloride 101 98 - 107 mmol/L    CO2 24 22 - 31 mmol/L    Anion Gap, Calculation 9 5 - 18 mmol/L    Glucose 107 70 - 125 mg/dL    Calcium 10.0 8.5 - 10.5 mg/dL    BUN 10 8 - 22 mg/dL    Creatinine 0.67 0.60 - 1.10 mg/dL    GFR MDRD Af Amer >60 >60 mL/min/1.73m2    GFR MDRD Non Af Amer >60 >60 mL/min/1.73m2   Thyroid Stimulating Hormone (TSH)   Result Value Ref Range    TSH 0.87 0.30 - 5.00 uIU/mL   Pregnancy (Beta-hCG, Qual), Urine   Result Value Ref Range    Pregnancy Test, Urine Negative Negative    Specific Gravity, UA 1.010 1.001 - 1.030   Urinalysis   Result Value Ref Range    Color, UA Yellow Colorless, Yellow, Straw, Light Yellow    Clarity, UA Clear Clear    Glucose, UA Negative Negative    Bilirubin, UA Negative Negative    Ketones, UA Negative Negative    Specific Gravity, UA 1.010 1.005 - 1.030    Blood, UA Moderate (!) Negative    pH, UA 6.5 5.0 - 8.0    Protein, UA Negative Negative mg/dL    Urobilinogen, UA 0.2 E.U./dL 0.2 E.U./dL, 1.0 E.U./dL    Nitrite, UA Negative Negative    Leukocytes, UA Negative Negative    Bacteria, UA Few (!) None Seen hpf    RBC, UA 3-5 (!) None Seen, 0-2 hpf    WBC, UA 0-5 None Seen, 0-5 hpf    Squam Epithel, UA 0-5 None Seen, 0-5 lpf      Assessment and Plan:   1. Fatigue  No lab abnormalities.  ? Mood disorder.  Seems mild.  Will discuss whether or not patient wants to try trazodone for sleep or other SSRI for mood modulation.    This visit was conducted with the aid of a professional .

## 2021-06-13 NOTE — PROGRESS NOTES
"Adult Physical:    CC:  cpe    HPI:  Discussed removal of IUD, which patient requests, today.  She is interested in another pregnancy.  Discussed potential for near immediate return to fertility.  Has not had menstrual cycles on IUD.  No other concerns.    Some scalp flaking.  Inquires about ways to help it.    Patient Active Problem List   Diagnosis     Premenstrual Syndromes     IUD (intrauterine device) in place     Past Medical History:  Past Medical History:   Diagnosis Date     Premenstrual Syndromes      Past Surgical History:  Past Surgical History:   Procedure Laterality Date      SECTION, LOW TRANSVERSE  10/2015     Medicines:  Outpatient Medications Prior to Visit   Medication Sig Dispense Refill     ibuprofen (ADVIL,MOTRIN) 600 MG tablet Take 1 tablet (600 mg total) by mouth every 6 (six) hours as needed for pain. 30 tablet 0     No facility-administered medications prior to visit.      Allergies:  No Known Allergies    Family History:  Family History   Problem Relation Age of Onset     No Medical Problems Sister      No Medical Problems Brother      Social History:  .  One child.  No tobacco or alcohol use.    Review of Systems:  10 point review unremarkable.    Physical Exam:  /60 (Patient Site: Left Arm, Patient Position: Sitting, Cuff Size: Adult Regular)  Pulse 80  Temp 98.7  F (37.1  C) (Oral)   Ht 4' 10.25\" (1.48 m)  Wt 151 lb (68.5 kg)  Breastfeeding? No  BMI 31.29 kg/m2  Gen:   Alert, not distressed  Head:   Normocephalic, without obvious abnormality, atraumatic  Eyes:   PERRL, conjunctiva/corneas clear, EOM's intact  Ears:   Normal tympanic membranes and external ear canals  Nose:    Mucosa normal, no drainage or sinus tenderness  Throat:    No erythema or exudates  Neck:    No adenopathy no nodules in thyroid, normal ROM  Lungs:    Clear to auscultation bilaterally, respirations unlabored  Chest wall:  No tenderness or deformity  Heart:     Regular, normal S1 and S2, " no murmur, gallop or rub  Abdomen:  Soft, non-tender, bowel sounds active all four quadrants, no masses, no organomegaly  Back:    Symmetric, no curvature, ROM normal, no CVA tenderness  Genitalia:    Normal cervix.  Extremities: Extremities normal, atraumatic, no cyanosis or edema  Skin:   Skin color, texture, turgor normal, no rashes or lesions    Mild seb derm of scalp.  Lymph nodes: Cervical, and supraclavicular, nodes normal  Neurologic: CNII-XII intact.   DTRs normal and symmetric.  Symmetric strength and sensation.    Immunizations Due:  FLU: ordred    Tobacco Cessation:   n/a    Cancer Screening:  Cervical:  Pap smear done    Weight management:   The patient was counseled regarding nutrition and physical activity    Assessment and Plan:  1. Seborrheic dermatitis of scalp  - ketoconazole (NIZORAL) 2 % shampoo; Apply to damp skin, lather, leave on 5 minutes, and rinse 2-3 times per week.  Dispense: 120 mL; Refill: 3    2. Encounter for preconception consultation  Advised use of prenatal vitamins for 1 month prior to conception.  - prenat.vits,naveen,min-iron-folic (PRENATAL VITAMIN) Tab; Take 1 tablet by mouth daily.  Dispense: 100 each; Refill: 5    3. Routine health maintenance  - Gynecologic Cytology (PAP Smear)  - Chlamydia trachomatis & Neisseria gonorrhoeae, Amplified Detection    4. Encounter for IUD removal  See procedure note.  - Removal of Intrauterine Device

## 2021-06-13 NOTE — PROGRESS NOTES
Removal of Intrauterine Device  Date/Time: 10/13/2017 4:25 PM  Performed by: THAI VELEZ.  Authorized by: THAI VELEZ   Consent: Verbal consent obtained.  Risks and benefits: risks, benefits and alternatives were discussed  Consent given by: patient  Comments: Narrative:    IUD strings easily visualized with introduction of speculum.  They were grasped with ring forceps and the complete IUD was removed with gentle traction.  Patient tolerated the procedure very well.  There were no complications.  After care instructions were given.

## 2021-06-14 NOTE — TELEPHONE ENCOUNTER
----- Message from Wai Macias MD sent at 10/14/2020 12:38 PM CDT -----  Need appt for diabetes follow up

## 2021-06-14 NOTE — TELEPHONE ENCOUNTER
In house Adela bellamyer    Called and was able to assist with a follow up visit in clinic with Dr. Macias on 1/21/21 at 3:00 pm. ACT was completed with pt over the phone and pt scored a 20, please see flowsheet. No further concerns or questions.

## 2021-06-14 NOTE — PROGRESS NOTES
"Subjective:  30 y.o. female with concerns of follow-up on diabetes and concerns of vaginal discharge and itching.    Has had IUD wants that checked.  Discharge and itching, monthly seemingly related to menses.    States her sugars are \"better.  Unable to take 2 pills of Metformin because she feels \"too tired.\"    BMI 33.    LMP earlier this month.    Asthma Control Test:  In the past 4 weeks, how much of the time did your asthma keep you from getting as much done at work, school, or at home?: 4 (1/21/2021  3:00 PM)  During the past 4 weeks, how often have you had shortness of breath?: 4 (1/21/2021  3:00 PM)  During the past 4 weeks, how often did your asthma symptoms (wheezing, coughing, shortness of breath, chest tightness or pain) wake you up at night or earlier in the morning?: 5 (1/21/2021  3:00 PM)  During the past 4 weeks, how often have you used your rescue inhaler or nebulizer medication (such as albuterol)?: 5 (1/21/2021  3:00 PM)  How would you rate your asthma control during the past 4 weeks?: 4 (1/21/2021  3:00 PM)  ACT Total Score: 22 (1/21/2021  3:00 PM)  In the past 12 months, have you visited the emergency room due to your asthma?: No (1/21/2021  3:00 PM)  In the past 12 months, have you been hospitalized due to your asthma?: No (1/21/2021  3:00 PM)       Outpatient Medications Prior to Visit   Medication Sig Dispense Refill     albuterol (PROAIR HFA;PROVENTIL HFA;VENTOLIN HFA) 90 mcg/actuation inhaler Inhale 2 puffs every 6 (six) hours as needed for wheezing. 1 each 2     budesonide-formoteroL (SYMBICORT) 160-4.5 mcg/actuation inhaler Inhale 2 puffs 2 (two) times a day. 1 Inhaler 12     metFORMIN (GLUCOPHAGE) 1000 MG tablet Take 1 tablet (1,000 mg total) by mouth 2 (two) times a day with meals. 180 tablet 1     No facility-administered medications prior to visit.       Social History     Tobacco Use   Smoking Status Never Smoker   Smokeless Tobacco Current User   Tobacco Comment    BETTLE NUT    "   Objective:  /86 (Patient Site: Right Arm, Patient Position: Sitting, Cuff Size: Adult Regular)   Pulse (!) 103   Wt 160 lb (72.6 kg)   BMI 33.15 kg/m    GENERAL: alert, not distressed  CHEST: clear, no rales, rhonchi, or wheezes  CARDIAC: regular without murmur, gallop, or rub  ABDOMEN: soft, non tender, non distended, normal bowel sounds  : White discharge.  Normal cervix.  IUD strings at os.    Recent Results (from the past 24 hour(s))   Glycosylated Hemoglobin A1c   Result Value Ref Range    Hemoglobin A1c 8.2 (H) <=5.6 %   Basic Metabolic Panel   Result Value Ref Range    Sodium 135 (L) 136 - 145 mmol/L    Potassium 4.3 3.5 - 5.0 mmol/L    Chloride 101 98 - 107 mmol/L    CO2 23 22 - 31 mmol/L    Anion Gap, Calculation 11 5 - 18 mmol/L    Glucose 177 (H) 70 - 125 mg/dL    Calcium 9.7 8.5 - 10.5 mg/dL    BUN 10 8 - 22 mg/dL    Creatinine 0.74 0.60 - 1.10 mg/dL    GFR MDRD Af Amer >60 >60 mL/min/1.73m2    GFR MDRD Non Af Amer >60 >60 mL/min/1.73m2   Wet Prep, Vaginal    Specimen: Genital   Result Value Ref Range    Yeast Result Yeast Seen (!) No yeast seen    Trichomonas No Trichomonas seen No Trichomonas seen    Clue Cells, Wet Prep Clue cells seen (!) No Clue cells seen      Assessment and Plan:   1. Mild persistent asthma without complication  No concerns with asthma.  Continue current medication plan.    2. Elevated liver enzymes  Need to add on evaluation.    3. Type 2 diabetes mellitus with unspecified complications (H)  Not controlled.  Taking only 1000 mg metformin once per day.  Need to add on another medication.  Will recommend Jardiance.  She has effective and reliable contraception.  - Glycosylated Hemoglobin A1c  - Basic Metabolic Panel    4. Vaginal discharge  Empirically treated with fluconazole on the day of visit.  We will likely need to follow this with metronidazole for bacterial vaginosis and then may need to retreat with fluconazole.  - Hepatitis B Surface Antigen (HBsAG)  - Wet  Prep, Vaginal  - Chlamydia trachomatis & Neisseria gonorrhoeae, Amplified Detection  - fluconazole (DIFLUCAN) 150 MG tablet; Take 1 tablet (150 mg total) by mouth once for 1 dose.  Dispense: 1 tablet; Refill: 0    5. Need for influenza vaccination  - Influenza, Seasonal Quad, PF =/> 6months    6. Need for hepatitis B vaccination  - Hepatitis B Vaccine Age 20 years and above    7. Screening for malignant neoplasm of cervix  - Gynecologic Cytology (PAP Smear)

## 2021-06-15 NOTE — PROGRESS NOTES
Discussed screening for aneuploidy and neural tube defects. Qaud screen ordered. Too late for 1st TM screen.    Reviewed intake exam, labs, TAMMY, past medical history, past surgical history, family history, genetic history, and previous obstetrical history.     Started discussion about TOLAC vs. Repeat.  Advised exercise daily and healthy weight gain to avoid HTN

## 2021-06-15 NOTE — PROGRESS NOTES
"Chief Complaint:  Chief Complaint   Patient presents with     Pregnancy Confirmation     #2, LMP: unknown     HPI:   Jami Prince is a 27 y.o. female is here for pregnancy intake.  She is .  Patient's last menstrual period was 10/13/2017.  Mirena IUD removed on 10/13/17, no periods since then.  She started feeling nausea in early December.  She did not take any home pregnancy tests.  She notes her sister was born with a \"hole in her heart or heart defect.\"  She cannot give me any more information on this.  Sister did not require surgery, and is doing well.  She doesn't think she had post-partum depression, but notes she was more irritable for a while after her delivery.    Past medical history: reviewed and updated.  Past Medical History:   Diagnosis Date     Premenstrual Syndromes      Urinary tract infection      Past Surgical History:   Procedure Laterality Date      SECTION, LOW TRANSVERSE  10/2015       Current Outpatient Prescriptions:      prenat.vits,naveen,min-iron-folic (PRENATAL VITAMIN) Tab, Take 1 tablet by mouth daily., Disp: 100 each, Rfl: 5    Social:  Social History   Substance Use Topics     Smoking status: Never Smoker     Smokeless tobacco: None     Alcohol use No       OBJECTIVE:  No Known Allergies  Vitals:    01/10/18 0909   BP: 108/72   Pulse: 60   Resp: 16     Body mass index is 32.12 kg/(m^2).    Vital signs stable as recorded above  General: Patient is alert and oriented x 3, in no apparent distress  Fetal Exam: Fundal height was not palpable, fetal heart tones are heard at 160 bpm.    Results:  Results for orders placed or performed in visit on 01/10/18   Pregnancy (Beta-hCG, Qual), Urine   Result Value Ref Range    Pregnancy Test, Urine Positive (!) Negative     Other screening pregnancy lab work is ordered and pending.    Patient scored a 2/30 on Zachary  Depression Screen.    Assessment and Plan:  1. Pregnancy Intake Appointment.  Jami Prince is 27 y.o. and " .  Patient's last menstrual period was 10/13/2017.  Estimated Date of Delivery: 18  She will be seeing Dr. Macias for OB care.  Screening pregnancy lab work was drawn.  Prenatal vitamins prescribed.  Problem list and current medications reviewed and updated.  Dating ultrasound ordered.  Prenatal info packet given.    2. Preeclampsia vs. Gestational Hypertension in third trimester for pregnancy in .  She was prescribed labetalol at discharge, not sure if she ever took this.    3. Low Transverse  .  Induced due to #2.  Interested in  if possible.    Follow up in 3-4 weeks.  Please see OB Episode for complete details.  Visit was approximately 35 minutes, greater than 50% of time spent in face-to-face counseling and coordination of care.    This dictation uses voice recognition software, which may contain typographical errors.

## 2021-06-16 PROBLEM — E11.9 TYPE 2 DIABETES MELLITUS WITHOUT COMPLICATION, WITHOUT LONG-TERM CURRENT USE OF INSULIN (H): Status: ACTIVE | Noted: 2018-09-24

## 2021-06-16 PROBLEM — R74.8 ELEVATED LIVER ENZYMES: Status: ACTIVE | Noted: 2018-09-24

## 2021-06-16 PROBLEM — J45.30 MILD PERSISTENT ASTHMA WITHOUT COMPLICATION: Status: ACTIVE | Noted: 2021-01-21

## 2021-06-16 PROBLEM — E66.09 CLASS 1 OBESITY DUE TO EXCESS CALORIES WITH SERIOUS COMORBIDITY AND BODY MASS INDEX (BMI) OF 30.0 TO 30.9 IN ADULT: Status: ACTIVE | Noted: 2018-09-24

## 2021-06-16 PROBLEM — E66.811 CLASS 1 OBESITY DUE TO EXCESS CALORIES WITH SERIOUS COMORBIDITY AND BODY MASS INDEX (BMI) OF 30.0 TO 30.9 IN ADULT: Status: ACTIVE | Noted: 2018-09-24

## 2021-06-16 NOTE — PROGRESS NOTES
No ctx, lof, bleeding, or dc.  No HA or vision changes.   TOLAC consult recommended.  Referral made.

## 2021-06-17 NOTE — TELEPHONE ENCOUNTER
Left message x 1. Please review message thread below and advise the patient as indicated. Please schedule if necessary or indicated in message thread.    Use  line to contact patient :asad  ----- Message from Wai Macias MD sent at 4/29/2021  9:17 AM CDT -----  Call:  Your sugars have been higher.  Definitely need to start the new medicine we talked about.  I think we should see you back in clinic in 2-4 weeks to check on how things are going.

## 2021-06-17 NOTE — PROGRESS NOTES
"[facility] GDM Care Plan      Assessment/Plan: pt seen today for f/u. She was started on insulin on 4/17. All BG are still very elevated. She is currently taking 3 units with meals and 8 units at HS. Will increase to 12 units at HS and 6 units with meals.   Reviewed signs and symptoms of hypoglycemia and treatment.   Encouraged pt to have HS snack with protein.   She is having 3 meals/day. Ketones are normal.     Time: 30  Visit Type: pregnancy clinic   Provider: Colleen  Provider's Diagnosis (per referral form): Gestational (648.83)  Lab Results   Component Value Date    HGBA1C 6.2 (H) 03/30/2018     Estimated Date of Delivery: 7/20/18   Pregnancy #: 2  Previous GDM: No   Medications:   Current Outpatient Prescriptions:      acetone, urine, test (ACETONE, URINE, TEST) Strp, Use 1 each As Directed daily before breakfast., Disp: 50 strip, Rfl: 3     blood glucose test strips, Use 1 each As Directed 4 (four) times a day., Disp: 100 strip, Rfl: 3     fluticasone (FLONASE) 50 mcg/actuation nasal spray, 2 sprays into each nostril daily., Disp: 10 g, Rfl: 11     insulin aspart U-100 (NOVOLOG FLEXPEN U-100 INSULIN) 100 unit/mL injection pen, Inject 3 Units under the skin 3 (three) times a day with meals., Disp: 1 Pre-filled Pen Syringe, Rfl: 3     insulin detemir U-100 (LEVEMIR FLEXTOUCH U-100 INSULN) 100 unit/mL (3 mL) pen, Inject 8 Units under the skin daily., Disp: 3 mL, Rfl: 3     lancets (ONETOUCH DELICA LANCETS) 33 gauge Misc, 1 each by In Vitro route 4 (four) times a day., Disp: 100 each, Rfl: 3     pen needle, diabetic (BD ULTRA-FINE AUGUSTO PEN NEEDLES) 32 gauge x 5/32\" Ndle, Inject 1 each under the skin 4 (four) times a day., Disp: 100 each, Rfl: 3     prenat.vits,naveen,min-iron-folic (PRENATAL VITAMIN) Tab, Take 1 tablet by mouth daily., Disp: 100 each, Rfl: 5      BG monitoring goals: Fasting <95; 1 hour post start of meal <140. Test 4 x per day.  Check fasting a.m. ketones: Yes  GDM meal pattern/carb counting taught per " guidelines: Yes    Past Goals:  Nutrition: GDM meal plan MET  Activity: Walking after meals when able/staying active MET  Monitoring: BG 4x/day as directed, ketones every morning MET      New Goals:  Nutrition: GDM meal plan   Activity: Walking after meals when able/staying active   Monitoring: BG 4x/day as directed, ketones every morning    DIABETES CARE PLAN AND EDUCATION RECORD    Gestational Diabetes Disease Process/Preconception Care/Management During Pregnancy/Postpartum:Discussed    Meter (per above goals): Discussed    Nutrition Management    Weight: Assessed and Discussed  Portions/Balance: Assessed and Discussed  Carb ID/Count: Assessed and Discussed  Label Reading: Assessed and Discussed  Menu Planning: Assessed and Discussed  Dining Out: Assessed and Discussed  Physical Activity: Assessed and Discussed    Acute Complications: Prevent, Detect, Treat:    Goal Setting and Problem Solving: Assessed and Discussed  Barriers: Assessed and Discussed  Psychosocial Adjustments: Assessed and Discussed      I agree with the aforementioned diabetes plan.  Luann Carlsonmynor  [facility] Endocrinology  4/19/2018  9:13 AM

## 2021-06-17 NOTE — PROGRESS NOTES
[facility]  ENDOCRINOLOGY    Gestational Diabetes 2018    Jami Prince, 1990, [id number]          Reason for visit      1. Gestational diabetes        HPI     Jami Prince is a very pleasant 27 y.o. old female who presents for GESTATIONAL Diabetes Mellitus.  She is currently 25w3d pregnant . Due date is 18  Diagnosed with GDM based on an OGTT. She hasnot had  GDM in prior pregnancies.   Current carbohydrate intake:consistent with recommendations of 30g-60g-60g.  I have reviewed her blood glucose logs and note that the:  Fasting readings  are:above range on current regimen  Postprandial readings are:above range on current regimen  Current NPH dose: 8  Current Prandial insulin: 3/3/3  Blood glucose logs/meter brought in and data reviewed and incorporated into decision-making.  Planned delivery at: [facility]  OBGYN: Colleen    Therapy/Interventions in the past:  She has been seen by the Diabetes Educator- and has received instruction on carbohydrate counting and  consistency.  Records from referring provider and other sources have also been reviewed and incorporated into decision-making.      TODAY:  Jami is here for the first time since starting insulin.  She is here with a Professional .  She has brought in her meter with her and both her FBS and PP readings are elevated. We will increase her insulin dosages.  She is not testing as she should be doing. She is not snacking before bed.  Baby is moving appropriately, and she is having no swelling.     Past Medical History       Patient Active Problem List   Diagnosis     Premenstrual Syndromes     Normal pregnancy     Gestational diabetes        Past Surgical History     Past Surgical History:   Procedure Laterality Date      SECTION, LOW TRANSVERSE  10/2015       Family History     Family History   Problem Relation Age of Onset     Heart defect Sister      No Medical Problems Brother        Social History     Social History   Substance  "Use Topics     Smoking status: Never Smoker     Smokeless tobacco: Never Used     Alcohol use No       Review of Systems     Patient has no polyuria or polydipsia, no chest pain, dyspnea or TIA's, no numbness, tingling or pain in extremities  Remainder negative except as noted in HPI.      Vital Signs     /62  Ht 4' 10.25\" (1.48 m)  Wt 167 lb 8 oz (76 kg)  LMP 10/13/2017  BMI 34.71 kg/m2  Wt Readings from Last 3 Encounters:   04/19/18 167 lb 8 oz (76 kg)   04/17/18 170 lb 8 oz (77.3 kg)   03/30/18 169 lb (76.7 kg)       Physical Exam     GENERAL: Pleasant, alert, appropriate appearance for age. No acute distress,   HEENT: Normocephalic, atraumatic  NECK: Supple, no masses or  lymph nodes.  THYROID: No nodules or enlargement. Non-tender, no bruit  CHEST/RESPIRATORY: Normal chest wall and respirations. Clear to auscultation.  CARDIOVASCULAR: Regular rate and rhythm. S1, S2, no murmur, click, gallop, or rubs.  ABDOMEN: Gravid   LYMPHATIC: No palpable nodes in neck, supraclavicular,  SKIN: No melanosis,  ecchymosis,  vitiligo. No acanthosis nigricans  NEURO:  Non-focal, normal DTRs; no tremor.  PSYCH: Alert and oriented -appropriate affect. Orientation, judgement and memory appear intact.  MSK: No joint abnormalities, FROM in all four extremities. No kyphosis    Assessment     1. Gestational diabetes        Plan     1. GESTATIONAL DIABETES-  Adjust dose as follows:    -NPH [id number]   units. Increase by 2 units every 2 days to keep fasting blood glucose below 95mg/dL  -Novolog 6  units with breakfast  -Novolog 6 units with lunch   -Novolog 6 units with dinner  -Increase by 2 units every 2 days to keep 1 hour after meal blood glucose less than 140mg/dL    We reviewed glucose goals of fasting blood glucose <95 mg/dL and 1 hour post prandial blood glucose of <140 mg/dL.    Monitor blood sugar 4 times daily: Fasting  and 1 hour after each meal.  Contact  this clinic [phone] if blood glucose is not within the " "above-mentioned goals.     We discussed the importance of excellent glycemic control during pregnancy to limit complications such as fetal macrosomia, shoulder dystocia,  hypoglycemia and hyperbilirubinemia.  I have discussed the patient's increased risk of recurrent GDM and/or development of type 2 diabetes later in life.      We will see pt back in 2 weeks. Time spent with pt today: 25 min with >50% spent in counseling and coordination of care.          Luann FLOR Ramesh  2018      Lab Results     Hemoglobin A1c   Date Value Ref Range Status   2018 6.2 (H) 3.5 - 6.0 % Final     Creatinine   Date Value Ref Range Status   2018 0.57 (L) 0.60 - 1.10 mg/dL Final   2017 0.67 0.60 - 1.10 mg/dL Final   10/25/2015 0.53 (L) 0.60 - 1.10 mg/dL Final       No results found for: CHOL, HDL, LDLCALC, TRIG    Lab Results   Component Value Date    ALT 15 10/25/2015    AST 32 10/25/2015         Current Medications     Outpatient Medications Prior to Visit   Medication Sig Dispense Refill     acetone, urine, test (ACETONE, URINE, TEST) Strp Use 1 each As Directed daily before breakfast. 50 strip 3     blood glucose test strips Use 1 each As Directed 4 (four) times a day. 100 strip 3     insulin aspart U-100 (NOVOLOG FLEXPEN U-100 INSULIN) 100 unit/mL injection pen Inject 3 Units under the skin 3 (three) times a day with meals. 1 Pre-filled Pen Syringe 3     insulin detemir U-100 (LEVEMIR FLEXTOUCH U-100 INSULN) 100 unit/mL (3 mL) pen Inject 8 Units under the skin daily. 3 mL 3     lancets (ONETOUCH DELICA LANCETS) 33 gauge Misc 1 each by In Vitro route 4 (four) times a day. 100 each 3     pen needle, diabetic (BD ULTRA-FINE AUGUSTO PEN NEEDLES) 32 gauge x 5/32\" Ndle Inject 1 each under the skin 4 (four) times a day. 100 each 3     prenat.vits,naveen,min-iron-folic (PRENATAL VITAMIN) Tab Take 1 tablet by mouth daily. 100 each 5     fluticasone (FLONASE) 50 mcg/actuation nasal spray 2 sprays into each nostril " daily. 10 g 11     No facility-administered medications prior to visit.

## 2021-06-17 NOTE — PROGRESS NOTES
[facility]  ENDOCRINOLOGY    Gestational Diabetes 2018    Jami Prince, 1990, [id number]          Reason for visit      1. Insulin controlled gestational diabetes mellitus (GDM) in third trimester        HPI     Jami Prince is a very pleasant 27 y.o. old female who presents for GESTATIONAL Diabetes Mellitus.  She is currently 28w6d  weeks pregnant . Due date is 18  Diagnosed with GDM based on an OGTT. She hasnot had  GDM in prior pregnancies.   Current carbohydrate intake:consistent with recommendations of 30g-60g-60g.  I have reviewed her blood glucose logs and note that the:  Fasting readings  are:above range on current regimen  Postprandial readings are:above range on current regimen  Current NPH dose: 12  Current Prandial insulin:   Blood glucose logs/meter brought in and data reviewed and incorporated into decision-making.  Planned delivery at: [facility]  OBGYN: Colleen    Therapy/Interventions in the past:  She has been seen by the Diabetes Educator- and has received instruction on carbohydrate counting and  consistency.  Records from referring provider and other sources have also been reviewed and incorporated into decision-making.      TODAY:  Jami is here for the first time after starting insulin for GDM. She is here with a professional .  She has brought in her log book and her FBS and PP readings are all elevated. We will increase her insulin dosages.  Baby is moving appropriately and she is having no swelling.  She reports that  has no concerns at present.  She is not eating a snack before bed.     Past Medical History       Patient Active Problem List   Diagnosis     Premenstrual Syndromes     Normal pregnancy     Gestational diabetes        Past Surgical History     Past Surgical History:   Procedure Laterality Date      SECTION, LOW TRANSVERSE  10/2015       Family History     Family History   Problem Relation Age of Onset     Heart defect Sister      No Medical  "Problems Brother        Social History     Social History   Substance Use Topics     Smoking status: Never Smoker     Smokeless tobacco: Never Used     Alcohol use No       Review of Systems     Patient has no polyuria or polydipsia, no chest pain, dyspnea or TIA's, no numbness, tingling or pain in extremities  Remainder negative except as noted in HPI.      Vital Signs     BP 90/66 (Patient Site: Right Arm, Patient Position: Sitting, Cuff Size: Adult Regular)  Pulse (!) 104  Ht 4' 10.25\" (1.48 m)  Wt 169 lb 9.6 oz (76.9 kg)  LMP 10/13/2017  BMI 35.14 kg/m2  Wt Readings from Last 3 Encounters:   05/03/18 169 lb 9.6 oz (76.9 kg)   05/02/18 168 lb (76.2 kg)   04/19/18 167 lb 8 oz (76 kg)       Physical Exam     GENERAL: Pleasant, alert, appropriate appearance for age. No acute distress,   HEENT: Normocephalic, atraumatic  NECK: Supple, no masses or  lymph nodes.  THYROID: No nodules or enlargement. Non-tender, no bruit  CHEST/RESPIRATORY: Normal chest wall and respirations. Clear to auscultation.  CARDIOVASCULAR: Regular rate and rhythm. S1, S2, no murmur, click, gallop, or rubs.  ABDOMEN: Gravid   LYMPHATIC: No palpable nodes in neck, supraclavicular,  SKIN: No melanosis,  ecchymosis,  vitiligo. No acanthosis nigricans  NEURO:  Non-focal, normal DTRs; no tremor.  PSYCH: Alert and oriented -appropriate affect. Orientation, judgement and memory appear intact.  MSK: No joint abnormalities, FROM in all four extremities. No kyphosis    Assessment     1. Insulin controlled gestational diabetes mellitus (GDM) in third trimester        Plan     1. GESTATIONAL DIABETES-  Adjust dose as follows:    -NPH insulin 18   units. Increase by 2 units every 2 days to keep fasting blood glucose below 95mg/dL  -Novolog 14  units with breakfast  -Novolog 12 units with lunch   -Novolog 12 units with dinner  -Increase by 2 units every 2 days to keep 1 hour after meal blood glucose less than 140mg/dL    We reviewed glucose goals of " fasting blood glucose <95 mg/dL and 1 hour post prandial blood glucose of <140 mg/dL.    Monitor blood sugar 4 times daily: Fasting  and 1 hour after each meal.  Contact  this clinic [phone] if blood glucose is not within the above-mentioned goals.     We discussed the importance of excellent glycemic control during pregnancy to limit complications such as fetal macrosomia, shoulder dystocia,  hypoglycemia and hyperbilirubinemia.  I have discussed the patient's increased risk of recurrent GDM and/or development of type 2 diabetes later in life.      Pt will f/u with us in 2 weeks.  Instructed to eat snack before bed, preferably high in protein.  Time spent with pt today: 25 min with >50% spent in counseling and coordination of care.          Luann Chandler  2018        Lab Results     Hemoglobin A1c   Date Value Ref Range Status   2018 6.2 (H) 3.5 - 6.0 % Final     Creatinine   Date Value Ref Range Status   2018 0.57 (L) 0.60 - 1.10 mg/dL Final   2017 0.67 0.60 - 1.10 mg/dL Final   10/25/2015 0.53 (L) 0.60 - 1.10 mg/dL Final       No results found for: CHOL, HDL, LDLCALC, TRIG    Lab Results   Component Value Date    ALT 15 10/25/2015    AST 32 10/25/2015         Current Medications     Outpatient Medications Prior to Visit   Medication Sig Dispense Refill     acetone, urine, test (ACETONE, URINE, TEST) Strp Use 1 each As Directed daily before breakfast. 50 strip 3     blood glucose test strips Use 1 each As Directed 4 (four) times a day. 100 strip 3     fluticasone (FLONASE) 50 mcg/actuation nasal spray 2 sprays into each nostril daily. 10 g 11     insulin aspart U-100 (NOVOLOG FLEXPEN U-100 INSULIN) 100 unit/mL injection pen Inject 3 Units under the skin 3 (three) times a day with meals. (Patient taking differently: Inject 8 Units under the skin 3 (three) times a day with meals. ) 1 Pre-filled Pen Syringe 3     insulin detemir U-100 (LEVEMIR FLEXTOUCH U-100 INSULN) 100 unit/mL  "(3 mL) pen Inject 8 Units under the skin daily. (Patient taking differently: Inject 12 Units under the skin daily. ) 3 mL 3     lancets (ONETOUCH DELICA LANCETS) 33 gauge Misc 1 each by In Vitro route 4 (four) times a day. 100 each 3     pen needle, diabetic (BD ULTRA-FINE AUGUSTO PEN NEEDLES) 32 gauge x 5/32\" Ndle Inject 1 each under the skin 4 (four) times a day. 100 each 3     prenat.vits,naveen,min-iron-folic (PRENATAL VITAMIN) Tab Take 1 tablet by mouth daily. 100 each 5     No facility-administered medications prior to visit.        "

## 2021-06-17 NOTE — PROGRESS NOTES
Sachi from [facility] will coordinate transportation for the patient, so please notify her with any future appointments she will be having with us.   Sachi @ [phone], [employer].   A message was left regarding [place] 5/3/2018 appt @ 8:20 with Darby Chandler.

## 2021-06-17 NOTE — PROGRESS NOTES
"[facility] GDM Care Plan      Assessment/Plan: pt seen today for f/u.  is present. Pt brings in BG log. She is checking as directed.   FBG range 104-130's.   Breakfast readings are 190-300.   Lunch and dinner readings are 150-190.   Pt is currently taking 12 units at HS and 8 units with meals.   Will increase to 18 units at HS and 14/12/12 per Darby.   Ketones are negative.     Pt will f/u in 2 weeks again, sooner with any concerns.       Time: 30  Visit Type: pregnancy clinic   Provider: Colleen   Provider's Diagnosis (per referral form):T2 DM in pregnancy   Lab Results   Component Value Date    HGBA1C 6.2 (H) 03/30/2018     Estimated Date of Delivery: 7/20/18   Pregnancy #: 2  Previous GDM: No   Medications:   Current Outpatient Prescriptions:      acetone, urine, test (ACETONE, URINE, TEST) Strp, Use 1 each As Directed daily before breakfast., Disp: 50 strip, Rfl: 3     blood glucose test strips, Use 1 each As Directed 4 (four) times a day., Disp: 100 strip, Rfl: 3     fluticasone (FLONASE) 50 mcg/actuation nasal spray, 2 sprays into each nostril daily., Disp: 10 g, Rfl: 11     insulin aspart U-100 (NOVOLOG FLEXPEN U-100 INSULIN) 100 unit/mL injection pen, Inject 3 Units under the skin 3 (three) times a day with meals., Disp: 1 Pre-filled Pen Syringe, Rfl: 3     insulin detemir U-100 (LEVEMIR FLEXTOUCH U-100 INSULN) 100 unit/mL (3 mL) pen, Inject 8 Units under the skin daily., Disp: 3 mL, Rfl: 3     lancets (ONETOUCH DELICA LANCETS) 33 gauge Misc, 1 each by In Vitro route 4 (four) times a day., Disp: 100 each, Rfl: 3     pen needle, diabetic (BD ULTRA-FINE AUGUSTO PEN NEEDLES) 32 gauge x 5/32\" Ndle, Inject 1 each under the skin 4 (four) times a day., Disp: 100 each, Rfl: 3     prenat.vits,naveen,min-iron-folic (PRENATAL VITAMIN) Tab, Take 1 tablet by mouth daily., Disp: 100 each, Rfl: 5      BG monitoring goals: Fasting <95; 1 hour post start of meal <140. Test 4 x per day.  Check fasting a.m. ketones: Yes  GDM meal " pattern/carb counting taught per guidelines: Yes    Past Goals:  Nutrition: GDM meal plan MET  Activity: Walking after meals when able/staying active MET  Monitoring: BG 4x/day as directed, ketones every morning MET      New Goals:  Nutrition: GDM meal plan   Activity: Walking after meals when able/staying active   Monitoring: BG 4x/day as directed, ketones every morning    DIABETES CARE PLAN AND EDUCATION RECORD    Gestational Diabetes Disease Process/Preconception Care/Management During Pregnancy/Postpartum:Discussed    Meter (per above goals): Discussed    Nutrition Management    Weight: Assessed and Discussed  Portions/Balance: Assessed and Discussed  Carb ID/Count: Assessed and Discussed  Label Reading: Assessed and Discussed  Menu Planning: Assessed and Discussed  Dining Out: Assessed and Discussed  Physical Activity: Assessed and Discussed    Acute Complications: Prevent, Detect, Treat:    Goal Setting and Problem Solving: Assessed and Discussed  Barriers: Assessed and Discussed  Psychosocial Adjustments: Assessed and Discussed    I agree with the aforementioned diabetes plan.  Luann FLOR [facility] Endocrinology  5/3/2018  9:33 AM

## 2021-06-17 NOTE — PROGRESS NOTES
"Subjective:  27 y.o. female with concerns of prenatal follow up.  Says that she has had runny nose, cough, and \"hard time breathing,\" since last in clinic 25 days ago.  Worse at night between 8 and 11.  No when lying down, just at night.  Cough sometimes productive of white sputum.  Has almost gone to the ER.  No chest pain or edema.  Not waking from sleep.    Can walk around her home without dyspnea.  Might get a little short of breath doing laundry.    Hx of asthma as a child, but nothing as an adult.  No hx of VTE.  No family hx.  Never smoker.  Sister with asthma.    No ctx, lof, bleeding, or dc.  No HA or vision changes.     Outpatient Medications Prior to Visit   Medication Sig Dispense Refill     prenat.vits,naveen,min-iron-folic (PRENATAL VITAMIN) Tab Take 1 tablet by mouth daily. 100 each 5     fluticasone (FLONASE) 50 mcg/actuation nasal spray 2 sprays into each nostril daily. 10 g 11     No facility-administered medications prior to visit.       History   Smoking Status     Never Smoker   Smokeless Tobacco     Never Used      Objective:  /60 (Patient Site: Right Arm, Patient Position: Sitting, Cuff Size: Adult Regular)  Pulse (!) 120  Temp 98.7  F (37.1  C) (Oral)   Wt 169 lb (76.7 kg)  LMP 10/13/2017  SpO2 97% Comment: room air  BMI 35.02 kg/m2  GENERAL: alert, not distressed  EYES: PERRL/EOMI, no scleral icterus, no conjunctival injection   EARS: normal tympanic membranes and external auditory canals bilaterally  PHARYNX: no erythema or exudates  MOUTH: well hydrated mucosa, no lesions  NECK: no lymphadenopathy or thyroid nodules CHEST: clear, no rales, rhonchi, or wheezes  CARDIAC: regular without murmur  ABDOMEN: gravid, soft, non tender, non distended, normal bowel sounds    Walking in clinic hallway, SaO2 = 97%, pulse 135    Electrocardiogram Perform and Read   Result Value Ref Range    SYSTOLIC BLOOD PRESSURE  mmHg    DIASTOLIC BLOOD PRESSURE  mmHg    VENTRICULAR RATE 114 BPM    ATRIAL " RATE 114 BPM    P-R INTERVAL 138 ms    QRS DURATION 78 ms    Q-T INTERVAL 340 ms    QTC CALCULATION (BEZET) 468 ms    P Axis 27 degrees    R AXIS 41 degrees    T AXIS -21 degrees    MUSE DIAGNOSIS       Sinus tachycardia  T wave abnormality, consider inferior ischemia  Abnormal ECG  No previous ECGs available  Confirmed by NURAI THOMPSON MD LOC: ([id number]) on 3/30/2018 4:28:05 PM     Urinalysis   Result Value Ref Range    Color, UA Yellow Colorless, Yellow, Straw, Light Yellow    Clarity, UA Clear Clear    Glucose, UA >=1000 mg/dL (!!) Negative    Bilirubin, UA Negative Negative    Ketones, UA 15 mg/dL (!!) Negative    Specific Gravity, UA 1.020 1.005 - 1.030    Blood, UA Negative Negative    pH, UA 7.0 5.0 - 8.0    Protein, UA Trace (!) Negative mg/dL    Urobilinogen, UA 0.2 E.U./dL 0.2 E.U./dL, 1.0 E.U./dL    Nitrite, UA Negative Negative    Leukocytes, UA Negative Negative   HM2(CBC w/o Differential)   Result Value Ref Range    WBC 10.4 4.0 - 11.0 thou/uL    RBC 4.68 3.80 - 5.40 mill/uL    Hemoglobin 12.6 12.0 - 16.0 g/dL    Hematocrit 39.9 35.0 - 47.0 %    MCV 85 80 - 100 fL    MCH 26.9 (L) 27.0 - 34.0 pg    MCHC 31.5 (L) 32.0 - 36.0 g/dL    RDW 12.0 11.0 - 14.5 %    Platelets 327 140 - 440 thou/uL    MPV 7.9 7.0 - 10.0 fL      Labs and EKG personally reviewed.     Assessment and Plan:   1. Dyspnea  Doubt ACS with few risk factors  Mostly likely is hypoventilation of pregnancy.  PE considered, but also not likely given relative stability of presentation without si/sx of DVT.  Discussed precautions.    Recommend ER visit if any worsening of dyspnea or with any chest pain.    2. Supervision of normal pregnancy in second trimester    3. ? glucose intolerance in pregnancy.  - should follow up for GCT or GTT   - await other labs

## 2021-06-17 NOTE — TELEPHONE ENCOUNTER
"Attempt to call pt, left voice mail for patient to call clinic back .#1     \" Okay to relay message\"     Letter mailed out   "

## 2021-06-17 NOTE — PROGRESS NOTES
"Chief Complaint   Patient presents with     Follow Up     Check up for my blood suger and also I would also like to remove the IUD today      Subjective:  30 y.o. female with concerns diabetes recheck.  She took Jardiance after last visit for 1 month.  She thought it caused constipation so she stopped it.  She does have some increased thirst and urination.  Think she may have some heartburn as well.    Patient requested IUD removal today.  She wanted it out because she feels like she has some vaginal itching.  I assume that her blood sugars are not controlled.  This could predispose her to vaginal yeast infections.  She does not desire pregnancy.  She wanted to use OCPs if she stopped using the IUD.    Outpatient Medications Prior to Visit   Medication Sig Dispense Refill     albuterol (PROAIR HFA;PROVENTIL HFA;VENTOLIN HFA) 90 mcg/actuation inhaler Inhale 2 puffs every 6 (six) hours as needed for wheezing. 1 each 2     budesonide-formoteroL (SYMBICORT) 160-4.5 mcg/actuation inhaler Inhale 2 puffs 2 (two) times a day. 1 Inhaler 12     metFORMIN (GLUCOPHAGE) 1000 MG tablet Take 1 tablet (1,000 mg total) by mouth 2 (two) times a day with meals. 180 tablet 1     empagliflozin (JARDIANCE) 25 mg Tab Take 1 tablet (25 mg total) by mouth daily. 90 tablet 0     No facility-administered medications prior to visit.       Social History     Tobacco Use   Smoking Status Never Smoker   Smokeless Tobacco Current User   Tobacco Comment    BETTLE NUT      Objective:  /83 (Patient Site: Left Arm, Patient Position: Sitting, Cuff Size: Adult Small)   Pulse (!) 103   Temp 97.9  F (36.6  C) (Temporal)   Resp 14   Ht 4' 10.35\" (1.482 m)   Wt 158 lb (71.7 kg)   SpO2 99%   BMI 32.63 kg/m    GENERAL: alert, not distressed  CHEST: clear, no rales, rhonchi, or wheezes  CARDIAC: regular without murmur, gallop, or rub  ABDOMEN: soft, non tender, non distended, normal bowel sounds    Recent Results (from the past 24 hour(s)) "   Glycosylated Hemoglobin A1c   Result Value Ref Range    Hemoglobin A1c 8.9 (H) <=5.6 %   Basic Metabolic Panel   Result Value Ref Range    Sodium 135 (L) 136 - 145 mmol/L    Potassium 4.2 3.5 - 5.0 mmol/L    Chloride 102 98 - 107 mmol/L    CO2 20 (L) 22 - 31 mmol/L    Anion Gap, Calculation 13 5 - 18 mmol/L    Glucose 234 (H) 70 - 125 mg/dL    Calcium 9.3 8.5 - 10.5 mg/dL    BUN 14 8 - 22 mg/dL    Creatinine 0.71 0.60 - 1.10 mg/dL    GFR MDRD Af Amer >60 >60 mL/min/1.73m2    GFR MDRD Non Af Amer >60 >60 mL/min/1.73m2   Hepatic Profile   Result Value Ref Range    Bilirubin, Total 0.6 0.0 - 1.0 mg/dL    Bilirubin, Direct 0.2 <=0.5 mg/dL    Protein, Total 7.6 6.0 - 8.0 g/dL    Albumin 3.9 3.5 - 5.0 g/dL    Alkaline Phosphatase 106 45 - 120 U/L    AST 88 (H) 0 - 40 U/L     (H) 0 - 45 U/L      Assessment and Plan:   1. Class 1 obesity due to excess calories with serious comorbidity and body mass index (BMI) of 30.0 to 30.9 in adult    2. Type 2 diabetes mellitus without complication, without long-term current use of insulin (H)  Not controlled.  Continue efforts at diet and exercise.  We will need to add another agent.  Sulfonylurea prescribed as below.  - Glycosylated Hemoglobin A1c  - Basic Metabolic Panel  - Ambulatory referral to Ophthalmology - [facility] [place]  - glipiZIDE (GLUCOTROL) 5 MG tablet; Take 1 tablet (5 mg total) by mouth 2 (two) times a day before meals.  Dispense: 180 tablet; Refill: 0    3. Yeast infection of the vagina  After discussion patient decided she would try different can treatment and try to get blood sugars under control.  This may stop the problem with her IUD and then she may not want to have it removed.  We discussed revisiting that prospect in a week or two.  Treat empirically in doses divided by 1 week.  .- fluconazole (DIFLUCAN) 150 MG tablet; Take 1 tablet (150 mg total) by mouth once a week.  Dispense: 2 tablet; Refill: 0    4. Elevated liver enzymes  Mild and likely  related to uncontrolled blood sugars, hypertriglyceridemia, and metabolic syndrome with fatty liver.  Stable to slightly improved since last check.  - Hepatic Profile

## 2021-06-17 NOTE — PROGRESS NOTES
Reports NPH 12 units at night.  Reports nolog with meals.  B: 8  L: 8  D: 8    Did not bring log.  No ctx, lof, bleeding, or dc.  No HA or vision changes.   Discussed monitoring for later in pregnancy.  Discussed delivery in 39th week.    Has follow up scheduled with endo/diabetes educators.

## 2021-06-17 NOTE — PROGRESS NOTES
[facility] GDM Care Plan    Assessment:   Jami is here for gestational diabetes visit.  This is her second pregnancy, no previous hx GDM- 26 weeks gestation.   Recent Alc 6.2%,  1 hr GTT screen 238 mg/dl. She typically eats twice daily.      BG:   mg/dl 2 hours after lunch (noodle soup).    Plan:  Discusssed with Darby Chandler NP immediate insulin initiation.   Jami is to start 8 units Levemir insulin at bedtime (10 pm) and 3 units Novolog insulin with each meal.  To test FBS and one hour after each meal.   Reviewed nutrition guidelines for gestational diabetes; 3 meals/3 snacks.  Rec to test morning urine for ketones.   To return this week to pregnancy clinic.   To call if 3 or more readings outside goal range.   Jami able to return demonstration of insulin pen injection and was able to verbalize her understanding of when, how much, and what type of insulin to use.    Reviewed signs and treatment for hypoglycemia.        Provider: Dr. Macias  Provider's Diagnosis (per referral form): Gestational (648.83)    Weight: 170 lb 8 oz (77.3 kg)  OGTT:   Results for orders placed or performed in visit on 08/06/15   Glucose, Gestational Challenge 2 Hour   Result Value Ref Range    Glucose, 2 Hour 132 60-<155 mg/dL   Glucose, Gestational Challenge 1 Hour   Result Value Ref Range    Glucose, 1 Hour 180 (H) 60-<180 mg/dL   Glucose, Gestational Challenge Fasting   Result Value Ref Range    Glucose, Fasting 93 60-<95 mg/dL    Patient Fasting > 8hrs? Yes    Glucose, Gestational Challenge 3 Hour   Result Value Ref Range    Glucose, 3 hour 111 60-<140 mg/dL     EDC: Estimated Date of Delivery: 18   Pregnancy #: 2  Previous GDM: No  Medications:   Current Outpatient Prescriptions:      fluticasone (FLONASE) 50 mcg/actuation nasal spray, 2 sprays into each nostril daily., Disp: 10 g, Rfl: 11     prenat.vits,naveen,min-iron-folic (PRENATAL VITAMIN) Tab, Take 1 tablet by mouth daily., Disp: 100 each, Rfl: 5    PNV: yes  B  BG  monitoring goals: Fasting <95; 1 hour post start of meal <140. Test 4 x per day.  GDM meal pattern/carb counting taught per guidelines: Yes    DIABETES CARE PLAN AND EDUCATION RECORD    Gestational Diabetes Disease Process/Preconception Care/Management During Pregnancy/Postpartum:Discussed    Meter (per above goals): Discussed, Literature provided and Patient returned demonstration    Nutrition Management    Weight: Assessed and Discussed  Portions/Balance: Assessed and discussed  Carb ID/Count: Assessed and discussed  Label Reading: Assessed and discussed  Menu Planning: Discussed  Dining Out: Not addressed  Physical Activity: Discussed and Literature provided  Medications: Discussed, Literature provided and Patient returned demonstration - needs review at follow up     Acute Complications: Prevent, Detect, Treat:      Hyperglycemia: Discussed, Literature provided and Needs instruction/review at follow-up  Goal Setting and Problem Solving: Needs instruction/review at follow-up  Barriers: Discussed  Psychosocial Adjustments: Discussed      Time: 60 Minutes   MNT  Visit Type: GDM Individual Follow-up  Visit #: 1    I agree with the aforementioned diabetes plan.  Luann FLOR [facility] Endocrinology  4/17/2018  3:23 PM

## 2021-06-18 ENCOUNTER — AMBULATORY - HEALTHEAST (OUTPATIENT)
Dept: NURSING | Facility: CLINIC | Age: 31
End: 2021-06-18

## 2021-06-18 NOTE — PROGRESS NOTES
No ctx, lof, bleeding, or dc.  No HA or vision changes.   Blood sugars not completely controlled  Insulin dose adjustment at last visit with GDM clinic.    Discussed my conversation with Dr. Argueta, who's opinion was that TOLAC was unlikely to be successful.  She is ready to consider repeat c/s.  Discussed that the optimal date for delivery will depend on her blood sugar control, but will likely be early.    BG's reported :  FB, 114, 126, 130, 123, 130  After breakfast: 137, 150, 171, 113, 97  After lunch: 107, 177, 160, 174, 140  After dinner: 177, 160, 174, 140    She is currently not adequately controlled.  Has made the following insulin changes:  Current insulin is Novolog with meals, 26, 20, 20 unites with breakfast, lunch, and dinner.  Basal insulin 32 units nightly.    Lab Results   Component Value Date    HGBA1C 6.2 (H) 2018      Discussed recommendation for  screening.  Overdue for start of that, but she does report that an ultrasound was done at the OB office.    I will ask that she have a BPP done today or tomorrow.  I will ask for a follow up visit with Dr. Argueta next week.  She has many appointments and would like to minimize them if possible.  I will ask OB to assume care for the remainder of pregnancy, since they can do the BPP in their office in conjunction with her follow up visits.

## 2021-06-18 NOTE — PROGRESS NOTES
"[facility] GDM Care Plan      Assessment/Plan: pt seen today for f/u. She brings in BG log. In the past week:   FBG: elevated 7/7 days, range:   Breakfast: elevated 5/7 times  Lunch: elevated 2/7 times  Dinner: elevated 2/7 times   She has not had any low BG.   Currently taking 32/26/30 with meals and 50 at HS. Will increase to 36/28/32 with meals and 56 at HS.   Pt will f/u again next week. She is planning on being induced on 7/13/18.       Time: 30  Visit Type: pregnancy clinic   Provider: MAYELA Argueta   Provider's Diagnosis (per referral form): T2 DM in pregnancy   Lab Results   Component Value Date    HGBA1C 6.2 (H) 03/30/2018   Estimated Date of Delivery: 7/20/18   Pregnancy #: 2  Previous GDM: No   Medications:   Current Outpatient Prescriptions:      acetone, urine, test (ACETONE, URINE, TEST) Strp, Use 1 each As Directed daily before breakfast., Disp: 50 strip, Rfl: 3     blood glucose test strips, Use 1 each As Directed 4 (four) times a day., Disp: 100 strip, Rfl: 3     fluticasone (FLONASE) 50 mcg/actuation nasal spray, 2 sprays into each nostril daily., Disp: 10 g, Rfl: 11     insulin aspart U-100 (NOVOLOG FLEXPEN U-100 INSULIN) 100 unit/mL injection pen, Three times per day with meals. 22 units with breakfast, 18 units with lunch, 16 units with dinner (Patient taking differently: Three times per day with meals. 28 units with breakfast, 22 units with lunch, 26 units with dinner), Disp: 30 mL, Rfl: 1     insulin glargine (LANTUS; BASAGLAR) 100 unit/mL (3 mL) pen, Inject 50 Units under the skin at bedtime., Disp: 5 adj dose pen, Rfl: 0     lancets (ONETOUCH DELICA LANCETS) 33 gauge Misc, 1 each by In Vitro route 4 (four) times a day., Disp: 100 each, Rfl: 3     pen needle, diabetic (BD ULTRA-FINE AUGUSTO PEN NEEDLES) 32 gauge x 5/32\" Ndle, Inject 1 each under the skin 4 (four) times a day., Disp: 100 each, Rfl: 3     prenat.vits,naveen,min-iron-folic (PRENATAL VITAMIN) Tab, Take 1 tablet by mouth daily., Disp: 100 " each, Rfl: 5      BG monitoring goals: Fasting <95; 1 hour post start of meal <140. Test 4 x per day.  Check fasting a.m. ketones: Yes  GDM meal pattern/carb counting taught per guidelines: Yes    Past Goals:  Nutrition: GDM meal plan MET  Activity: Walking after meals when able/staying active MET  Monitoring: BG 4x/day as directed, ketones every morning MET      New Goals:  Nutrition: GDM meal plan   Activity: Walking after meals when able/staying active   Monitoring: BG 4x/day as directed, ketones every morning    DIABETES CARE PLAN AND EDUCATION RECORD    Gestational Diabetes Disease Process/Preconception Care/Management During Pregnancy/Postpartum:Discussed    Meter (per above goals): Discussed    Nutrition Management    Weight: Assessed and Discussed  Portions/Balance: Assessed and Discussed  Carb ID/Count: Assessed and Discussed  Label Reading: Assessed and Discussed  Menu Planning: Assessed and Discussed  Dining Out: Assessed and Discussed  Physical Activity: Assessed and Discussed    Acute Complications: Prevent, Detect, Treat:    Goal Setting and Problem Solving: Assessed and Discussed  Barriers: Assessed and Discussed  Psychosocial Adjustments: Assessed and Discussed    I agree with the aforementioned diabetes plan.  Luann FLOR [facility] Endocrinology  6/27/2018  9:35 AM

## 2021-06-18 NOTE — PROGRESS NOTES
[facility] GDM Care Plan    Assessment: pt seen today for f/u.  is present. She brings in BG log book.   FBG are between 100-115. Currently taking 18 units at HS. Will increase to 22 units at HS per protocol.   She is taking 14/12/12 with meals.   Breakfast readings: have been WNL up until the past 2 days the readings were in the 200's. Discussed this at length and pt cannot tell me anything that she did differently that would cause the reading to elevate.   Lunch readings: 150-160  Dinner readings: have been WNL for about the past week.   Denies any s/s of low BG.   Will increase meal time to 16/14/12 per protocol. Advised pt if she continues to have readings >140 after breakfast in a couple of days, to increase to 18 units with breakfast. She verbalized understanding.   Pt will f/u in PC in 2 weeks, sooner with any concerns.       Visit Type: GDM Individual Follow-up  Time: 30  Provider: Colleen  Provider's Diagnosis (per referral form): T2 DM in pregnancy   Weight: 170 lb 11.2 oz (77.4 kg)   Lab Results   Component Value Date    HGBA1C 6.2 (H) 03/30/2018     Estimated Date of Delivery: 7/20/18   Pregnancy #: 2  Previous GDM: No   Medications:   Current Outpatient Prescriptions:      acetone, urine, test (ACETONE, URINE, TEST) Strp, Use 1 each As Directed daily before breakfast., Disp: 50 strip, Rfl: 3     blood glucose test strips, Use 1 each As Directed 4 (four) times a day., Disp: 100 strip, Rfl: 3     fluticasone (FLONASE) 50 mcg/actuation nasal spray, 2 sprays into each nostril daily., Disp: 10 g, Rfl: 11     insulin aspart U-100 (NOVOLOG FLEXPEN U-100 INSULIN) 100 unit/mL injection pen, Inject 3 Units under the skin 3 (three) times a day with meals. (Patient taking differently: Inject 8 Units under the skin 3 (three) times a day with meals. ), Disp: 1 Pre-filled Pen Syringe, Rfl: 3     insulin detemir U-100 (LEVEMIR FLEXTOUCH U-100 INSULN) 100 unit/mL (3 mL) pen, Inject 8 Units under the skin daily.  "(Patient taking differently: Inject 12 Units under the skin daily. ), Disp: 3 mL, Rfl: 3     lancets (ONETOUCH DELICA LANCETS) 33 gauge Misc, 1 each by In Vitro route 4 (four) times a day., Disp: 100 each, Rfl: 3     pen needle, diabetic (BD ULTRA-FINE AUGUSTO PEN NEEDLES) 32 gauge x 5/32\" Ndle, Inject 1 each under the skin 4 (four) times a day., Disp: 100 each, Rfl: 3     prenat.vits,naveen,min-iron-folic (PRENATAL VITAMIN) Tab, Take 1 tablet by mouth daily., Disp: 100 each, Rfl: 5    BG monitoring goals: Fasting <95; 1 hour post start of meal <140. Test 4 x per day.  Check fasting a.m. ketones: Yes  GDM meal pattern/carb counting taught per guidelines: Yes    Past Goals:  Nutrition: GDM meal plan MET  Activity: Walking after meals when able/staying active MET  Monitoring: BG 4x/day as directed, ketones every morning MET      New Goals:  Nutrition: GDM meal plan   Activity: Walking after meals when able/staying active   Monitoring: BG 4x/day as directed, ketones every morning    DIABETES CARE PLAN AND EDUCATION RECORD    Gestational Diabetes Disease Process/Preconception Care/Management During Pregnancy/Postpartum:Discussed    Meter (per above goals): Discussed    Nutrition Management    Weight: Assessed and Discussed  Portions/Balance: Assessed and Discussed  Carb ID/Count: Assessed and Discussed  Label Reading: Assessed and Discussed  Menu Planning: Assessed and Discussed  Dining Out: Assessed and Discussed  Physical Activity: Assessed and Discussed    Acute Complications: Prevent, Detect, Treat:    Goal Setting and Problem Solving: Assessed and Discussed  Barriers: Assessed and Discussed  Psychosocial Adjustments: Assessed and Discussed    "

## 2021-06-18 NOTE — PROGRESS NOTES
[facility]  ENDOCRINOLOGY    Gestational Diabetes 2018    Jami Prince, 1990, [id number]          Reason for visit      1. Insulin controlled gestational diabetes mellitus (GDM) in third trimester        HPI     Jami Prince is a very pleasant 27 y.o. old female who presents for GESTATIONAL Diabetes Mellitus.  She is currently 35w  weeks pregnant . Due date is 18  Diagnosed with GDM based on an OGTT. She hasnot had  GDM in prior pregnancies.   Current carbohydrate intake:consistent with recommendations of 30g-60g-60g.  I have reviewed her blood glucose logs and note that the:  Fasting readings  are:above range on current regimen  Postprandial readings are:above range on current regimen  Current Levemir dose: 36  Current Prandial insulin:   Blood glucose logs/meter brought in and data reviewed and incorporated into decision-making.  Planned delivery at: [facility]  OBGYN: Colleen  Therapy/Interventions in the past:  She has been seen by the Diabetes Educator- and has received instruction on carbohydrate counting and  consistency.  Records from referring provider and other sources have also been reviewed and incorporated into decision-making.      TODAY:  Jami returns today in f/u for GDM.  She is here with a professional .  She has brought her log book and her blood sugars are below.  Not very well controlled. We will be increasing her insulin across the board. Baby is passing the NSTs and BPPs.  She is seeing Dr Argueta tomorrow, as we are told that Dr Macias is uncomfortable 2/2 her elevated BG and would prefer that she have an OB present. Baby is moving appropriately and she is having no swelling.      FBG range: 103-123 with 1 reading up to 160  Pp breakfast: 124-155  Pp lunch: 140-190 - 1 reading at 83 in which pt felt low, this was about 3 hours after eating  Pp dinner: 169-208    Past Medical History       Patient Active Problem List   Diagnosis     Premenstrual Syndromes      "Normal pregnancy     Gestational diabetes        Past Surgical History     Past Surgical History:   Procedure Laterality Date      SECTION, LOW TRANSVERSE  10/2015       Family History     Family History   Problem Relation Age of Onset     Heart defect Sister      No Medical Problems Brother        Social History     Social History   Substance Use Topics     Smoking status: Never Smoker     Smokeless tobacco: Never Used     Alcohol use No       Review of Systems     Patient has no polyuria or polydipsia, no chest pain, dyspnea or TIA's, no numbness, tingling or pain in extremities  Remainder negative except as noted in HPI.      Vital Signs     BP 94/70 (Patient Site: Right Arm, Patient Position: Sitting, Cuff Size: Adult Regular)  Pulse 76  Ht 4' 10.25\" (1.48 m)  Wt 174 lb 9.6 oz (79.2 kg)  LMP 10/13/2017  BMI 36.18 kg/m2  Wt Readings from Last 3 Encounters:   18 174 lb 9.6 oz (79.2 kg)   18 172 lb (78 kg)   18 170 lb 14.4 oz (77.5 kg)       Physical Exam     GENERAL: Pleasant, alert, appropriate appearance for age. No acute distress,   HEENT: Normocephalic, atraumatic  NECK: Supple, no masses or  lymph nodes.  THYROID: No nodules or enlargement. Non-tender, no bruit  CHEST/RESPIRATORY: Normal chest wall and respirations. Clear to auscultation.  CARDIOVASCULAR: Regular rate and rhythm. S1, S2, no murmur, click, gallop, or rubs.  ABDOMEN: Gravid   LYMPHATIC: No palpable nodes in neck, supraclavicular,  SKIN: No melanosis,  ecchymosis,  vitiligo. No acanthosis nigricans  NEURO:  Non-focal, normal DTRs; no tremor.  PSYCH: Alert and oriented -appropriate affect. Orientation, judgement and memory appear intact.  MSK: No joint abnormalities, FROM in all four extremities. No kyphosis    Assessment     1. Insulin controlled gestational diabetes mellitus (GDM) in third trimester        Plan     1. GESTATIONAL DIABETES-  Adjust dose as follows:    -Levemir insulin 44   units. Increase by 2 " units every 2 days to keep fasting blood glucose below 95mg/dL  -Novolog 28  units with breakfast  -Novolog 22 units with lunch   -Novolog 26 units with dinner  -Increase by 2 units every 2 days to keep 1 hour after meal blood glucose less than 140mg/dL    We reviewed glucose goals of fasting blood glucose <95 mg/dL and 1 hour post prandial blood glucose of <140 mg/dL.    Monitor blood sugar 4 times daily: Fasting  and 1 hour after each meal.  Contact  this clinic [phone] if blood glucose is not within the above-mentioned goals.     We discussed the importance of excellent glycemic control during pregnancy to limit complications such as fetal macrosomia, shoulder dystocia,  hypoglycemia and hyperbilirubinemia.  I have discussed the patient's increased risk of recurrent GDM and/or development of type 2 diabetes later in life.      Pt will f/u next week. Time spent with pt today: 25 min with >50% spent in counseling and coordination of care.          Luann Chandler  2018      Lab Results     Hemoglobin A1c   Date Value Ref Range Status   2018 6.2 (H) 3.5 - 6.0 % Final     Creatinine   Date Value Ref Range Status   2018 0.57 (L) 0.60 - 1.10 mg/dL Final   2017 0.67 0.60 - 1.10 mg/dL Final   10/25/2015 0.53 (L) 0.60 - 1.10 mg/dL Final       No results found for: CHOL, HDL, LDLCALC, TRIG    Lab Results   Component Value Date    ALT 15 10/25/2015    AST 32 10/25/2015         Current Medications     Outpatient Medications Prior to Visit   Medication Sig Dispense Refill     acetone, urine, test (ACETONE, URINE, TEST) Strp Use 1 each As Directed daily before breakfast. 50 strip 3     blood glucose test strips Use 1 each As Directed 4 (four) times a day. 100 strip 3     fluticasone (FLONASE) 50 mcg/actuation nasal spray 2 sprays into each nostril daily. 10 g 11     insulin aspart U-100 (NOVOLOG FLEXPEN U-100 INSULIN) 100 unit/mL injection pen Three times per day with meals. 22 units with  "breakfast, 18 units with lunch, 16 units with dinner (Patient taking differently: Three times per day with meals. 26 units with breakfast, 20 units with lunch, 20 units with dinner) 30 mL 1     insulin detemir U-100 (LEVEMIR FLEXTOUCH U-100 INSULN) 100 unit/mL (3 mL) pen Inject 28 Units under the skin daily. (Patient taking differently: Inject 36 Units under the skin daily. ) 15 mL 1     lancets (ONETOUCH DELICA LANCETS) 33 gauge Misc 1 each by In Vitro route 4 (four) times a day. 100 each 3     pen needle, diabetic (BD ULTRA-FINE AUGUSTO PEN NEEDLES) 32 gauge x 5/32\" Ndle Inject 1 each under the skin 4 (four) times a day. 100 each 3     prenat.vits,naveen,min-iron-folic (PRENATAL VITAMIN) Tab Take 1 tablet by mouth daily. 100 each 5     No facility-administered medications prior to visit.        "

## 2021-06-18 NOTE — PROGRESS NOTES
"Subjective:  Reports current insulin  B: 12  L: 14   D: 18    Night: 22 units.  Doesn't have meter.    Has trouble breathing.  Started .  Off an on.  Lasts 1-3 hours.  No edema.  Has cough.  Hx of asthma as a child, but not as adult.  No fever.  No chest pain.    Exam:  /60 (Patient Site: Left Arm, Patient Position: Sitting, Cuff Size: Adult Regular)  Pulse (!) 106  Temp 97.6  F (36.4  C) (Oral)   Wt 172 lb (78 kg)  LMP 10/13/2017  SpO2 96% Comment: room air  BMI 35.64 kg/m2   GEN: a/o nad  CHEST: end expiratory wheezing  COR: regular without murmur  ABD: gravid, soft, not tender.  MS: no calf tenderness or palpable cords, negative Homans, no edema.    Assessment and Plan:  GDM A2  - has high insulin needs  - discussed recommended at least weekly  testing at 32 weeks.  - we discussed my recommendation that she return to [facility] ob for further discussion of delivery planning and for that  testing  - had previously seen Dr. Argueta, who was concerned that her pelvis was not adequate for delivery of anything but a quite small baby.  - discussed that TOLAC looking less safe with large fundal height, GDM, and small pelvis.    Shortness of breath and wheezing:  - response to albuterol neb \"feels a little better\"  Wheezes no longer heard on exam.  - other considerations include PE, cardiomyopathy, decreased inspiratory volume from large fetus vs. polyhydramnios.  - PE seems less likely with her exam findings and some response to albuterol  - discussed ER/911 for any worsening.    This visit was conducted with the aid of a professional .   "

## 2021-06-18 NOTE — PROGRESS NOTES
[facility]  ENDOCRINOLOGY    Gestational Diabetes 2018    Jami Prince, 1990, [id number]          Reason for visit      1. Insulin controlled gestational diabetes mellitus (GDM) in third trimester        HPI     Jami Prince is a very pleasant 27 y.o. old female who presents for GESTATIONAL Diabetes Mellitus.  She is currently 32w6d  weeks pregnant . Due date is 18  Diagnosed with GDM based on an OGTT. She hasnot had  GDM in prior pregnancies.   Current carbohydrate intake:consistent with recommendations of 30g-60g-60g.  I have reviewed her blood glucose logs and note that the:  Fasting readings  are:above range on current regimen  Postprandial readings are:above range on current regimen  Current Levemir dose: 22  Current Prandial insulin:   Blood glucose logs/meter brought in and data reviewed and incorporated into decision-making.  Planned delivery at: [facility]  OBGYN: Colleen  Therapy/Interventions in the past:  She has been seen by the Diabetes Educator- and has received instruction on carbohydrate counting and  consistency.  Records from referring provider and other sources have also been reviewed and incorporated into decision-making.      TODAY:  Jami returns today in f/u for GDM.  She is here with a professional .  She has brought her log book with her and has elevations in all of her PP readings, as well as FBS. We will increase her insulin across the board.  She is due for an US tomorrow (last Friday). She is unsure if there has been a measurement of the baby.  She reports that Dr has no current concerns.  Baby is moving appropriately and she is having no swelling.     Past Medical History       Patient Active Problem List   Diagnosis     Premenstrual Syndromes     Normal pregnancy     Gestational diabetes        Past Surgical History     Past Surgical History:   Procedure Laterality Date      SECTION, LOW TRANSVERSE  10/2015       Family History     Family History  "  Problem Relation Age of Onset     Heart defect Sister      No Medical Problems Brother        Social History     Social History   Substance Use Topics     Smoking status: Never Smoker     Smokeless tobacco: Never Used     Alcohol use No       Review of Systems     Patient has no polyuria or polydipsia, no chest pain, dyspnea or TIA's, no numbness, tingling or pain in extremities  Remainder negative except as noted in HPI.      Vital Signs     BP 94/64 (Patient Site: Right Arm, Patient Position: Sitting, Cuff Size: Adult Regular)  Pulse (!) 102  Ht 4' 10.25\" (1.48 m)  Wt 171 lb 12.8 oz (77.9 kg)  LMP 10/13/2017  BMI 35.6 kg/m2  Wt Readings from Last 3 Encounters:   05/31/18 171 lb 12.8 oz (77.9 kg)   05/23/18 172 lb (78 kg)   05/17/18 170 lb 11.2 oz (77.4 kg)       Physical Exam     GENERAL: Pleasant, alert, appropriate appearance for age. No acute distress,   HEENT: Normocephalic, atraumatic  NECK: Supple, no masses or  lymph nodes.  THYROID: No nodules or enlargement. Non-tender, no bruit  CHEST/RESPIRATORY: Normal chest wall and respirations. Clear to auscultation.  CARDIOVASCULAR: Regular rate and rhythm. S1, S2, no murmur, click, gallop, or rubs.  ABDOMEN: Gravid   LYMPHATIC: No palpable nodes in neck, supraclavicular,  SKIN: No melanosis,  ecchymosis,  vitiligo. No acanthosis nigricans  NEURO:  Non-focal, normal DTRs; no tremor.  PSYCH: Alert and oriented -appropriate affect. Orientation, judgement and memory appear intact.  MSK: No joint abnormalities, FROM in all four extremities. No kyphosis    Assessment     1. Insulin controlled gestational diabetes mellitus (GDM) in third trimester        Plan     1. GESTATIONAL DIABETES-  Adjust dose as follows:    -Levemir [id number]   units. Increase by 2 units every 2 days to keep fasting blood glucose below 95mg/dL  -Novolog 22  units with breakfast  -Novolog 18 units with lunch   -Novolog 16 units with dinner  -Increase by 2-4 units every 2 days to keep 1 hour " "after meal blood glucose less than 140mg/dL    We reviewed glucose goals of fasting blood glucose <95 mg/dL and 1 hour post prandial blood glucose of <140 mg/dL.    Monitor blood sugar 4 times daily: Fasting  and 1 hour after each meal.  Contact  this clinic [phone] if blood glucose is not within the above-mentioned goals.     We discussed the importance of excellent glycemic control during pregnancy to limit complications such as fetal macrosomia, shoulder dystocia,  hypoglycemia and hyperbilirubinemia.  I have discussed the patient's increased risk of recurrent GDM and/or development of type 2 diabetes later in life.      Jami will return in 2 weeks. Time spent with pt today: 25 min with >50% spent in counseling and coordination of care.          Luann Chandler  2018    Lab Results     Hemoglobin A1c   Date Value Ref Range Status   2018 6.2 (H) 3.5 - 6.0 % Final     Creatinine   Date Value Ref Range Status   2018 0.57 (L) 0.60 - 1.10 mg/dL Final   2017 0.67 0.60 - 1.10 mg/dL Final   10/25/2015 0.53 (L) 0.60 - 1.10 mg/dL Final       No results found for: CHOL, HDL, LDLCALC, TRIG    Lab Results   Component Value Date    ALT 15 10/25/2015    AST 32 10/25/2015         Current Medications     Outpatient Medications Prior to Visit   Medication Sig Dispense Refill     acetone, urine, test (ACETONE, URINE, TEST) Strp Use 1 each As Directed daily before breakfast. 50 strip 3     blood glucose test strips Use 1 each As Directed 4 (four) times a day. 100 strip 3     fluticasone (FLONASE) 50 mcg/actuation nasal spray 2 sprays into each nostril daily. 10 g 11     lancets (ONETOUCH DELICA LANCETS) 33 gauge Misc 1 each by In Vitro route 4 (four) times a day. 100 each 3     pen needle, diabetic (BD ULTRA-FINE AUGUSTO PEN NEEDLES) 32 gauge x 5/32\" Ndle Inject 1 each under the skin 4 (four) times a day. 100 each 3     prenat.vits,naveen,min-iron-folic (PRENATAL VITAMIN) Tab Take 1 tablet by mouth " daily. 100 each 5     insulin aspart U-100 (NOVOLOG FLEXPEN U-100 INSULIN) 100 unit/mL injection pen Inject 18 Units under the skin 3 (three) times a day with meals. 15 mL 1     insulin detemir U-100 (LEVEMIR FLEXTOUCH U-100 INSULN) 100 unit/mL (3 mL) pen Inject 8 Units under the skin daily. (Patient taking differently: Inject 12 Units under the skin daily. ) 3 mL 3     No facility-administered medications prior to visit.

## 2021-06-18 NOTE — PROGRESS NOTES
"IVY GDM Care Plan      Assessment/Plan: Pt seen today for f/u. She brings in BG log book. BG are not well controlled.   FBG range: 103-123 with 1 reading up to 160  Pp breakfast: 124-155  Pp lunch: 140-190 - 1 reading at 83 in which pt felt low, this was about 3 hours after eating  Pp dinner: 169-208    She is taking 26/20/20 with meals and 36 at HS  Reviewed signs and symptoms of hypoglycemia and treatment.   Will increase per Darby to 28/22/26 with meals and 44 at HS. Pt will f/u again next week.       Time: 30  Visit Type: pregnancy clinic   Provider: Colleen, now going to be seen by Dr. Ronald Argueta   Provider's Diagnosis (per referral form): T2 DM in pregnancy   Lab Results   Component Value Date    HGBA1C 6.2 (H) 03/30/2018      Estimated Date of Delivery: 7/20/18   Pregnancy #: 2  Previous GDM: No   Medications:   Current Outpatient Prescriptions:      acetone, urine, test (ACETONE, URINE, TEST) Strp, Use 1 each As Directed daily before breakfast., Disp: 50 strip, Rfl: 3     blood glucose test strips, Use 1 each As Directed 4 (four) times a day., Disp: 100 strip, Rfl: 3     fluticasone (FLONASE) 50 mcg/actuation nasal spray, 2 sprays into each nostril daily., Disp: 10 g, Rfl: 11     insulin aspart U-100 (NOVOLOG FLEXPEN U-100 INSULIN) 100 unit/mL injection pen, Three times per day with meals. 22 units with breakfast, 18 units with lunch, 16 units with dinner (Patient taking differently: Three times per day with meals. 26 units with breakfast, 20 units with lunch, 20 units with dinner), Disp: 30 mL, Rfl: 1     insulin detemir U-100 (LEVEMIR FLEXTOUCH U-100 INSULN) 100 unit/mL (3 mL) pen, Inject 28 Units under the skin daily. (Patient taking differently: Inject 36 Units under the skin daily. ), Disp: 15 mL, Rfl: 1     lancets (ONETOUCH DELICA LANCETS) 33 gauge Misc, 1 each by In Vitro route 4 (four) times a day., Disp: 100 each, Rfl: 3     pen needle, diabetic (BD ULTRA-FINE AUGUSTO PEN NEEDLES) 32 gauge x 5/32\" " Ndle, Inject 1 each under the skin 4 (four) times a day., Disp: 100 each, Rfl: 3     prenat.vits,naveen,min-iron-folic (PRENATAL VITAMIN) Tab, Take 1 tablet by mouth daily., Disp: 100 each, Rfl: 5      BG monitoring goals: Fasting <95; 1 hour post start of meal <140. Test 4 x per day.  Check fasting a.m. ketones: Yes  GDM meal pattern/carb counting taught per guidelines: Yes    Past Goals:  Nutrition: GDM meal plan MET  Activity: Walking after meals when able/staying active MET  Monitoring: BG 4x/day as directed, ketones every morning MET      New Goals:  Nutrition: GDM meal plan   Activity: Walking after meals when able/staying active   Monitoring: BG 4x/day as directed, ketones every morning    DIABETES CARE PLAN AND EDUCATION RECORD    Gestational Diabetes Disease Process/Preconception Care/Management During Pregnancy/Postpartum:Discussed    Meter (per above goals): Discussed    Nutrition Management    Weight: Assessed and Discussed  Portions/Balance: Assessed and Discussed  Carb ID/Count: Assessed and Discussed  Label Reading: Assessed and Discussed  Menu Planning: Assessed and Discussed  Dining Out: Assessed and Discussed  Physical Activity: Assessed and Discussed    Acute Complications: Prevent, Detect, Treat:    Goal Setting and Problem Solving: Assessed and Discussed  Barriers: Assessed and Discussed  Psychosocial Adjustments: Assessed and Discussed    I agree with the aforementioned diabetes plan.  Luann Carlsonmynor  [facility] Endocrinology  6/14/2018  10:41 AM

## 2021-06-18 NOTE — PROGRESS NOTES
[facility]  ENDOCRINOLOGY    Gestational Diabetes 2018    Jami Prince, 1990, [id number]          Reason for visit      1. Insulin controlled gestational diabetes mellitus (GDM) in third trimester        HPI     Jami Prince is a very pleasant 27 y.o. old female who presents for GESTATIONAL Diabetes Mellitus.  She is currently 36w  weeks pregnant . Due date is 18  Diagnosed with GDM based on an OGTT. She hasnot had  GDM in prior pregnancies.   Current carbohydrate intake:consistent with recommendations of 30g-60g-60g.  I have reviewed her blood glucose logs and note that the:  Fasting readings  are:above range on current regimen  Postprandial readings are:above range on current regimen  Current Levemir dose: 44  Current Prandial insulin:   Blood glucose logs/meter brought in and data reviewed and incorporated into decision-making.  Planned delivery at: [facility]  OBGYN: Colleen  Therapy/Interventions in the past:  She has been seen by the Diabetes Educator- and has received instruction on carbohydrate counting and  consistency.  Records from referring provider and other sources have also been reviewed and incorporated into decision-making.      TODAY:  Jami returns today in f/u for GDM.  She is here with her cousin, who is operating as an  today.  She has brought in her log book today, and all of her numbers remain elevated.  We will increase her insulin dosages today, as she is unable to titrate.  At last weight, baby weighed 5 lbs.  Baby is moving appropriately and she is having no swelling. Baby is passing NSTs and BPPs.    has no current concerns.     Past Medical History       Patient Active Problem List   Diagnosis     Premenstrual Syndromes     Normal pregnancy     Gestational diabetes        Past Surgical History     Past Surgical History:   Procedure Laterality Date      SECTION, LOW TRANSVERSE  10/2015       Family History     Family History   Problem Relation Age  "of Onset     Heart defect Sister      No Medical Problems Brother        Social History     Social History   Substance Use Topics     Smoking status: Never Smoker     Smokeless tobacco: Never Used     Alcohol use No       Review of Systems     Patient has no polyuria or polydipsia, no chest pain, dyspnea or TIA's, no numbness, tingling or pain in extremities  Remainder negative except as noted in HPI.      Vital Signs     /68 (Patient Site: Right Arm, Patient Position: Sitting, Cuff Size: Adult Regular)  Pulse 90  Ht 4' 10.25\" (1.48 m)  Wt 174 lb (78.9 kg)  LMP 10/13/2017  BMI 36.05 kg/m2  Wt Readings from Last 3 Encounters:   06/20/18 174 lb (78.9 kg)   06/14/18 174 lb 9.6 oz (79.2 kg)   06/07/18 172 lb (78 kg)       Physical Exam     GENERAL: Pleasant, alert, appropriate appearance for age. No acute distress,   HEENT: Normocephalic, atraumatic  NECK: Supple, no masses or  lymph nodes.  THYROID: No nodules or enlargement. Non-tender, no bruit  CHEST/RESPIRATORY: Normal chest wall and respirations. Clear to auscultation.  CARDIOVASCULAR: Regular rate and rhythm. S1, S2, no murmur, click, gallop, or rubs.  ABDOMEN: Gravid   LYMPHATIC: No palpable nodes in neck, supraclavicular,  SKIN: No melanosis,  ecchymosis,  vitiligo. No acanthosis nigricans  NEURO:  Non-focal, normal DTRs; no tremor.  PSYCH: Alert and oriented -appropriate affect. Orientation, judgement and memory appear intact.  MSK: No joint abnormalities, FROM in all four extremities. No kyphosis    Assessment     1. Insulin controlled gestational diabetes mellitus (GDM) in third trimester        Plan     1. GESTATIONAL DIABETES-  Adjust dose as follows:    -Levemir insulin 50   units. Increase by 2 units every 2 days to keep fasting blood glucose below 95mg/dL  -Novolog 32  units with breakfast  -Novolog 26 units with lunch   -Novolog 30 units with dinner  -Increase by 2 units every 2 days to keep 1 hour after meal blood glucose less than " 140mg/dL    We reviewed glucose goals of fasting blood glucose <95 mg/dL and 1 hour post prandial blood glucose of <140 mg/dL.    Monitor blood sugar 4 times daily: Fasting  and 1 hour after each meal.  Contact  this clinic [phone] if blood glucose is not within the above-mentioned goals.     We discussed the importance of excellent glycemic control during pregnancy to limit complications such as fetal macrosomia, shoulder dystocia,  hypoglycemia and hyperbilirubinemia.  I have discussed the patient's increased risk of recurrent GDM and/or development of type 2 diabetes later in life.      Jami will f/u next week, likely for her last appointment before delivery. Time spent with pt today: 25 min with >50% spent in counseling and coordination of care.          Luann Chandler  2018      Lab Results     Hemoglobin A1c   Date Value Ref Range Status   2018 6.2 (H) 3.5 - 6.0 % Final     Creatinine   Date Value Ref Range Status   2018 0.57 (L) 0.60 - 1.10 mg/dL Final   2017 0.67 0.60 - 1.10 mg/dL Final   10/25/2015 0.53 (L) 0.60 - 1.10 mg/dL Final       No results found for: CHOL, HDL, LDLCALC, TRIG    Lab Results   Component Value Date    ALT 15 10/25/2015    AST 32 10/25/2015         Current Medications     Outpatient Medications Prior to Visit   Medication Sig Dispense Refill     acetone, urine, test (ACETONE, URINE, TEST) Strp Use 1 each As Directed daily before breakfast. 50 strip 3     blood glucose test strips Use 1 each As Directed 4 (four) times a day. 100 strip 3     fluticasone (FLONASE) 50 mcg/actuation nasal spray 2 sprays into each nostril daily. 10 g 11     insulin aspart U-100 (NOVOLOG FLEXPEN U-100 INSULIN) 100 unit/mL injection pen Three times per day with meals. 22 units with breakfast, 18 units with lunch, 16 units with dinner (Patient taking differently: Three times per day with meals. 28 units with breakfast, 22 units with lunch, 26 units with dinner) 30 mL 1      "lancets (ONETOUCH DELICA LANCETS) 33 gauge Misc 1 each by In Vitro route 4 (four) times a day. 100 each 3     pen needle, diabetic (BD ULTRA-FINE AUGUSTO PEN NEEDLES) 32 gauge x 5/32\" Ndle Inject 1 each under the skin 4 (four) times a day. 100 each 3     prenat.vits,naveen,min-iron-folic (PRENATAL VITAMIN) Tab Take 1 tablet by mouth daily. 100 each 5     insulin detemir U-100 (LEVEMIR FLEXTOUCH U-100 INSULN) 100 unit/mL (3 mL) pen Inject 28 Units under the skin daily. (Patient taking differently: Inject 44 Units under the skin daily. ) 15 mL 1     No facility-administered medications prior to visit.        "

## 2021-06-18 NOTE — PROGRESS NOTES
"IVY GDM Care Plan    Assessment: pt seen today for f/u. She was just seen in pregnancy clinic on Thursday, with most all BG above goal.   Today, she brings in BG log book. She has increased insulin to  with meals and 32 at HS.   FB, 114, 126, 130, 123, 130  After breakfast: 137, 150, 171, 113, 97  After lunch: 107, 177, 160, 174, 140  After dinner: 177, 160, 174, 140  She has not had any low BG.   Discussed with Darby Chandler. Will lincrease to  with meals and 36 at HS. Pt will f/u again next week in [place] as scheduled.   Ketones are all negative     Visit Type: Individual Follow-up  Time: 30  Provider: Collene   Provider's Diagnosis (per referral form): T2 DM in pregnancy   Weight: 170 lb 14.4 oz (77.5 kg)   Estimated Date of Delivery: 18   Pregnancy #: 2  Previous GDM: No   Medications:   Current Outpatient Prescriptions:      acetone, urine, test (ACETONE, URINE, TEST) Strp, Use 1 each As Directed daily before breakfast., Disp: 50 strip, Rfl: 3     blood glucose test strips, Use 1 each As Directed 4 (four) times a day., Disp: 100 strip, Rfl: 3     fluticasone (FLONASE) 50 mcg/actuation nasal spray, 2 sprays into each nostril daily., Disp: 10 g, Rfl: 11     insulin aspart U-100 (NOVOLOG FLEXPEN U-100 INSULIN) 100 unit/mL injection pen, Three times per day with meals. 22 units with breakfast, 18 units with lunch, 16 units with dinner, Disp: 30 mL, Rfl: 1     insulin detemir U-100 (LEVEMIR FLEXTOUCH U-100 INSULN) 100 unit/mL (3 mL) pen, Inject 28 Units under the skin daily., Disp: 15 mL, Rfl: 1     lancets (ONETOUCH DELICA LANCETS) 33 gauge Misc, 1 each by In Vitro route 4 (four) times a day., Disp: 100 each, Rfl: 3     pen needle, diabetic (BD ULTRA-FINE AUGUSTO PEN NEEDLES) 32 gauge x 5/32\" Ndle, Inject 1 each under the skin 4 (four) times a day., Disp: 100 each, Rfl: 3     prenat.vits,naveen,min-iron-folic (PRENATAL VITAMIN) Tab, Take 1 tablet by mouth daily., Disp: 100 each, Rfl: 5    BG " monitoring goals: Fasting <95; 1 hour post start of meal <140. Test 4 x per day.  Check fasting a.m. ketones: Yes  GDM meal pattern/carb counting taught per guidelines: Yes    Past Goals:  Nutrition: GDM meal plan MET  Activity: Walking after meals when able/staying active MET  Monitoring: BG 4x/day as directed, ketones every morning MET      New Goals:  Nutrition: GDM meal plan   Activity: Walking after meals when able/staying active   Monitoring: BG 4x/day as directed, ketones every morning    DIABETES CARE PLAN AND EDUCATION RECORD    Gestational Diabetes Disease Process/Preconception Care/Management During Pregnancy/Postpartum:Discussed    Meter (per above goals): Discussed    Nutrition Management    Weight: Assessed and Discussed  Portions/Balance: Assessed and Discussed  Carb ID/Count: Assessed and Discussed  Label Reading: Assessed and Discussed  Menu Planning: Assessed and Discussed  Dining Out: Assessed and Discussed  Physical Activity: Assessed and Discussed    Acute Complications: Prevent, Detect, Treat:    Goal Setting and Problem Solving: Assessed and Discussed  Barriers: Assessed and Discussed  Psychosocial Adjustments: Assessed and Discussed      I agree with the aforementioned diabetes plan.  Luann Carlsonmynor  [facility] Endocrinology  6/5/2018  10:27 AM

## 2021-06-18 NOTE — PROGRESS NOTES
"[facility] GDM Care Plan      Assessment/Plan: pt seen today for f/u. She brings in BG log book. She is currently taking 18/14/12 with meals and 22 units at HS. All BG are WNL, maybe just 1 reading at goal/week per area.   Will increase to 28 units at HS and 22/18/16 with meals per Darby. Pt has not had any low BG.   Will f/u with her again early next week, Tuesday, to review BG again.       Time: 30  Visit Type: pregnancy clinic   Provider: Colleen   Provider's Diagnosis (per referral form): T2 DM in pregnancy   Lab Results   Component Value Date    HGBA1C 6.2 (H) 03/30/2018      Estimated Date of Delivery: 7/20/18   Pregnancy #: 2  Previous GDM: No   Medications:   Current Outpatient Prescriptions:      acetone, urine, test (ACETONE, URINE, TEST) Strp, Use 1 each As Directed daily before breakfast., Disp: 50 strip, Rfl: 3     blood glucose test strips, Use 1 each As Directed 4 (four) times a day., Disp: 100 strip, Rfl: 3     fluticasone (FLONASE) 50 mcg/actuation nasal spray, 2 sprays into each nostril daily., Disp: 10 g, Rfl: 11     insulin aspart U-100 (NOVOLOG FLEXPEN U-100 INSULIN) 100 unit/mL injection pen, Inject 18 Units under the skin 3 (three) times a day with meals., Disp: 15 mL, Rfl: 1     insulin detemir U-100 (LEVEMIR FLEXTOUCH U-100 INSULN) 100 unit/mL (3 mL) pen, Inject 8 Units under the skin daily. (Patient taking differently: Inject 12 Units under the skin daily. ), Disp: 3 mL, Rfl: 3     lancets (ONETOUCH DELICA LANCETS) 33 gauge Misc, 1 each by In Vitro route 4 (four) times a day., Disp: 100 each, Rfl: 3     pen needle, diabetic (BD ULTRA-FINE AUGUSTO PEN NEEDLES) 32 gauge x 5/32\" Ndle, Inject 1 each under the skin 4 (four) times a day., Disp: 100 each, Rfl: 3     prenat.vits,naveen,min-iron-folic (PRENATAL VITAMIN) Tab, Take 1 tablet by mouth daily., Disp: 100 each, Rfl: 5      BG monitoring goals: Fasting <95; 1 hour post start of meal <140. Test 4 x per day.  Check fasting a.m. ketones: Yes  GDM meal " pattern/carb counting taught per guidelines: Yes    Past Goals:  Nutrition: GDM meal plan MET  Activity: Walking after meals when able/staying active MET  Monitoring: BG 4x/day as directed, ketones every morning MET      New Goals:  Nutrition: GDM meal plan   Activity: Walking after meals when able/staying active   Monitoring: BG 4x/day as directed, ketones every morning    DIABETES CARE PLAN AND EDUCATION RECORD    Gestational Diabetes Disease Process/Preconception Care/Management During Pregnancy/Postpartum:Discussed    Meter (per above goals): Discussed    Nutrition Management    Weight: Assessed and Discussed  Portions/Balance: Assessed and Discussed  Carb ID/Count: Assessed and Discussed  Label Reading: Assessed and Discussed  Menu Planning: Assessed and Discussed  Dining Out: Assessed and Discussed  Physical Activity: Assessed and Discussed    Acute Complications: Prevent, Detect, Treat:    Goal Setting and Problem Solving: Assessed and Discussed  Barriers: Assessed and Discussed  Psychosocial Adjustments: Assessed and Discussed    I agree with the aforementioned diabetes plan.  Luann FLOR formerly [facility]  5/31/2018  9:01 AM

## 2021-06-18 NOTE — PROGRESS NOTES
"[facility] GDM Care Plan      Assessment/Plan: pt seen today for f/u. She is taking 28/22/26 with meals and 44 units at HS. Most all BG are slightly above goal. Will increase to 32/26/30 with meals and 50 at HS. No other concerns. Pt will f/u again next week in [place].       Time: 30  Visit Type: pregnancy clinic   Provider: MAYELA Argueta   Provider's Diagnosis (per referral form): T2 DM in pregnancy   Lab Results   Component Value Date    HGBA1C 6.2 (H) 03/30/2018      Estimated Date of Delivery: 7/20/18   Pregnancy #: 2  Previous GDM: No   Medications:   Current Outpatient Prescriptions:      acetone, urine, test (ACETONE, URINE, TEST) Strp, Use 1 each As Directed daily before breakfast., Disp: 50 strip, Rfl: 3     blood glucose test strips, Use 1 each As Directed 4 (four) times a day., Disp: 100 strip, Rfl: 3     fluticasone (FLONASE) 50 mcg/actuation nasal spray, 2 sprays into each nostril daily., Disp: 10 g, Rfl: 11     insulin aspart U-100 (NOVOLOG FLEXPEN U-100 INSULIN) 100 unit/mL injection pen, Three times per day with meals. 22 units with breakfast, 18 units with lunch, 16 units with dinner (Patient taking differently: Three times per day with meals. 26 units with breakfast, 20 units with lunch, 20 units with dinner), Disp: 30 mL, Rfl: 1     insulin detemir U-100 (LEVEMIR FLEXTOUCH U-100 INSULN) 100 unit/mL (3 mL) pen, Inject 28 Units under the skin daily. (Patient taking differently: Inject 36 Units under the skin daily. ), Disp: 15 mL, Rfl: 1     lancets (ONETOUCH DELICA LANCETS) 33 gauge Misc, 1 each by In Vitro route 4 (four) times a day., Disp: 100 each, Rfl: 3     pen needle, diabetic (BD ULTRA-FINE AUGUSTO PEN NEEDLES) 32 gauge x 5/32\" Ndle, Inject 1 each under the skin 4 (four) times a day., Disp: 100 each, Rfl: 3     prenat.vits,naveen,min-iron-folic (PRENATAL VITAMIN) Tab, Take 1 tablet by mouth daily., Disp: 100 each, Rfl: 5      BG monitoring goals: Fasting <95; 1 hour post start of meal <140. Test 4 x per " day.  Check fasting a.m. ketones: Yes  GDM meal pattern/carb counting taught per guidelines: Yes    Past Goals:  Nutrition: GDM meal plan MET  Activity: Walking after meals when able/staying active MET  Monitoring: BG 4x/day as directed, ketones every morning MET      New Goals:  Nutrition: GDM meal plan   Activity: Walking after meals when able/staying active   Monitoring: BG 4x/day as directed, ketones every morning    DIABETES CARE PLAN AND EDUCATION RECORD    Gestational Diabetes Disease Process/Preconception Care/Management During Pregnancy/Postpartum:Discussed    Meter (per above goals): Discussed    Nutrition Management    Weight: Assessed and Discussed  Portions/Balance: Assessed and Discussed  Carb ID/Count: Assessed and Discussed  Label Reading: Assessed and Discussed  Menu Planning: Assessed and Discussed  Dining Out: Assessed and Discussed  Physical Activity: Assessed and Discussed    Acute Complications: Prevent, Detect, Treat:    Goal Setting and Problem Solving: Assessed and Discussed  Barriers: Assessed and Discussed  Psychosocial Adjustments: Assessed and Discussed    I agree with the aforementioned diabetes plan.  Luann FLOR [facility]  6/20/2018  9:15 AM

## 2021-06-19 NOTE — ANESTHESIA POSTPROCEDURE EVALUATION
Patient: Jami Prince   SECTION, REPEAT  Anesthesia type: spinal    Patient location: Labor and Delivery  Last vitals:   Vitals:    18 1610   BP: 117/69   Pulse: 98   Resp: 17   Temp:    SpO2:      Post vital signs: stable  Level of consciousness: awake and responds to simple questions  Post-anesthesia pain: pain controlled  Post-anesthesia nausea and vomiting: no  Pulmonary: unassisted, return to baseline  Cardiovascular: stable and blood pressure at baseline  Hydration: adequate  Anesthetic events: no    QCDR Measures:  ASA# 11 - Chantel-op Cardiac Arrest: ASA11B - Patient did NOT experience unanticipated cardiac arrest  ASA# 12 - Chantel-op Mortality Rate: ASA12B - Patient did NOT die  ASA# 13 - PACU Re-Intubation Rate: NA - No ETT / LMA used for case  ASA# 10 - Composite Anes Safety: ASA10A - No serious adverse event    Additional Notes:

## 2021-06-19 NOTE — PROGRESS NOTES
[facility] GDM Care Plan    Assessment: pt seen today for f/u. She brings in BG log book.   FB, 110, 113, 120, 120, 128  After breakfast: 180, 208, 150, 187, 149, 188  After lunch: 150, 160, 180, 280, 170, 188  After dinner: 170, 154, 160, 167 150    Currently taking 56 units at HS and 36/28/32 with meals. She has not had any low BG. Discussed with Darby Chandler NP. Will increase to 64 units at HS and 40/32/36 with meals. Pt is planning on being induced next week on 18. She will continue with everything until delivery. She is to call with any concerns prior. Discussed pp care.   A1c done today at 6.8%    Ketones are all negative.         Visit Type: Individual Follow-up  Time:   Provider: MAYELA Argueta   Provider's Diagnosis (per referral form): T2 DM in pregnancy   Vitals:    18 1458   Weight: 182 lb 3.2 oz (82.6 kg)     Lab Results   Component Value Date    HGBA1C 6.8 (H) 2018       Lab Results   Component Value Date    HGBA1C 6.2 (H) 2018   Estimated Date of Delivery: 18 C/S   Pregnancy #: 2  Previous GDM: No   Medications:   Current Outpatient Prescriptions:      acetone, urine, test (ACETONE, URINE, TEST) Strp, Use 1 each As Directed daily before breakfast., Disp: 50 strip, Rfl: 3     blood glucose test strips, Use 1 each As Directed 4 (four) times a day., Disp: 100 strip, Rfl: 3     fluticasone (FLONASE) 50 mcg/actuation nasal spray, 2 sprays into each nostril daily., Disp: 10 g, Rfl: 11     insulin aspart U-100 (NOVOLOG FLEXPEN U-100 INSULIN) 100 unit/mL injection pen, Three times per day with meals. 22 units with breakfast, 18 units with lunch, 16 units with dinner (Patient taking differently: Three times per day with meals. 32 units with breakfast, 26 units with lunch, 30 units with dinner), Disp: 30 mL, Rfl: 1     insulin glargine (LANTUS; BASAGLAR) 100 unit/mL (3 mL) pen, Inject 50 Units under the skin at bedtime., Disp: 5 adj dose pen, Rfl: 0     lancets (ONETOUCH DELICA  "LANCETS) 33 gauge Misc, 1 each by In Vitro route 4 (four) times a day., Disp: 100 each, Rfl: 3     pen needle, diabetic (BD ULTRA-FINE AUGUSTO PEN NEEDLES) 32 gauge x 5/32\" Ndle, Inject 1 each under the skin 4 (four) times a day., Disp: 100 each, Rfl: 3     prenat.vits,naveen,min-iron-folic (PRENATAL VITAMIN) Tab, Take 1 tablet by mouth daily., Disp: 100 each, Rfl: 5    BG monitoring goals: Fasting <95; 1 hour post start of meal <140. Test 4 x per day.  Check fasting a.m. ketones: Yes  GDM meal pattern/carb counting taught per guidelines: Yes    Past Goals:  Nutrition: GDM meal plan MET  Activity: Walking after meals when able/staying active MET  Monitoring: BG 4x/day as directed, ketones every morning MET      New Goals:  Nutrition: GDM meal plan   Activity: Walking after meals when able/staying active   Monitoring: BG 4x/day as directed, ketones every morning    DIABETES CARE PLAN AND EDUCATION RECORD    Gestational Diabetes Disease Process/Preconception Care/Management During Pregnancy/Postpartum:Discussed    Meter (per above goals): Discussed    Nutrition Management    Weight: Assessed and Discussed  Portions/Balance: Assessed and Discussed  Carb ID/Count: Assessed and Discussed  Label Reading: Assessed and Discussed  Menu Planning: Assessed and Discussed  Dining Out: Assessed and Discussed  Physical Activity: Assessed and Discussed    Acute Complications: Prevent, Detect, Treat:    Goal Setting and Problem Solving: Assessed and Discussed  Barriers: Assessed and Discussed  Psychosocial Adjustments: Assessed and Discussed    "

## 2021-06-19 NOTE — PROGRESS NOTES
[facility]  ENDOCRINOLOGY    Gestational Diabetes 2018    Jami Prince, 1990, [id number]          Reason for visit      1. Insulin controlled gestational diabetes mellitus (GDM) in third trimester        HPI     Jami Prince is a very pleasant 27 y.o. old female who presents for GESTATIONAL Diabetes Mellitus.  She is currently 36w5d pregnant . Due date is 18  Diagnosed with GDM based on an OGTT. She hasnot had  GDM in prior pregnancies.   Current carbohydrate intake:consistent with recommendations of 30g-60g-60g.  I have reviewed her blood glucose logs and note that the:  Fasting readings  are:above range on current regimen  Postprandial readings are:above range on current regimen  Current Levemir dose: 50  Current Prandial insulin:   Blood glucose logs/meter brought in and data reviewed and incorporated into decision-making.  Planned delivery at: [facility]  OBGYN: Colleen  Therapy/Interventions in the past:  She has been seen by the Diabetes Educator- and has received instruction on carbohydrate counting and  consistency.  Records from referring provider and other sources have also been reviewed and incorporated into decision-making.      TODAY:  Jami returns today in f/u for GDM.  She is here with a professional .  She is restless and appears uncomfortable.  She has brought in her log book and her numbers show 2 WNL out of 7 after breakfast, 5 WNL out of 7 after Lunch, and 5 WNL out of 7 after dinner. Her FBS are elevated. She has not yet seen her OB, but is being followed by her PCP, Dr Macias.  Baby is moving appropriately and she is having no swelling.  We will increase her insulin.       Past Medical History       Patient Active Problem List   Diagnosis     Premenstrual Syndromes     Normal pregnancy     Gestational diabetes        Past Surgical History     Past Surgical History:   Procedure Laterality Date      SECTION, LOW TRANSVERSE  10/2015       Family History  "    Family History   Problem Relation Age of Onset     Heart defect Sister      No Medical Problems Brother        Social History     Social History   Substance Use Topics     Smoking status: Never Smoker     Smokeless tobacco: Never Used     Alcohol use No       Review of Systems     Patient has no polyuria or polydipsia, no chest pain, dyspnea or TIA's, no numbness, tingling or pain in extremities  Remainder negative except as noted in HPI.      Vital Signs     /68 (Patient Site: Right Arm, Patient Position: Sitting, Cuff Size: Adult Regular)  Pulse 90  Ht 4' 10.25\" (1.48 m)  Wt 179 lb 14.4 oz (81.6 kg)  LMP 10/13/2017  BMI 37.28 kg/m2  Wt Readings from Last 3 Encounters:   06/27/18 179 lb 14.4 oz (81.6 kg)   06/20/18 174 lb (78.9 kg)   06/14/18 174 lb 9.6 oz (79.2 kg)       Physical Exam     GENERAL: Pleasant, alert, appropriate appearance for age. No acute distress,   HEENT: Normocephalic, atraumatic  NECK: Supple, no masses or  lymph nodes.  THYROID: No nodules or enlargement. Non-tender, no bruit  CHEST/RESPIRATORY: Normal chest wall and respirations. Clear to auscultation.  CARDIOVASCULAR: Regular rate and rhythm. S1, S2, no murmur, click, gallop, or rubs.  ABDOMEN: Gravid   LYMPHATIC: No palpable nodes in neck, supraclavicular,  SKIN: No melanosis,  ecchymosis,  vitiligo. No acanthosis nigricans  NEURO:  Non-focal, normal DTRs; no tremor.  PSYCH: Alert and oriented -appropriate affect. Orientation, judgement and memory appear intact.  MSK: No joint abnormalities, FROM in all four extremities. No kyphosis    Assessment     1. Insulin controlled gestational diabetes mellitus (GDM) in third trimester        Plan     1. GESTATIONAL DIABETES-  Adjust dose as follows:    -NPH [id number]   units. Increase by 2 units every 2 days to keep fasting blood glucose below 95mg/dL  -Novolog 36  units with breakfast  -Novolog 28 units with lunch   -Novolog 32 units with dinner  -Increase by 2 units every 2 days to " keep 1 hour after meal blood glucose less than 140mg/dL    We reviewed glucose goals of fasting blood glucose <95 mg/dL and 1 hour post prandial blood glucose of <140 mg/dL.    Monitor blood sugar 4 times daily: Fasting  and 1 hour after each meal.  Contact  this clinic [phone] if blood glucose is not within the above-mentioned goals.     We discussed the importance of excellent glycemic control during pregnancy to limit complications such as fetal macrosomia, shoulder dystocia,  hypoglycemia and hyperbilirubinemia.  I have discussed the patient's increased risk of recurrent GDM and/or development of type 2 diabetes later in life.        We will see her back next week. Time spent with pt today: 25 min with >50% spent in counseling and coordination of care.        Luann Chandler  2018      Lab Results     Hemoglobin A1c   Date Value Ref Range Status   2018 6.2 (H) 3.5 - 6.0 % Final     Creatinine   Date Value Ref Range Status   2018 0.57 (L) 0.60 - 1.10 mg/dL Final   2017 0.67 0.60 - 1.10 mg/dL Final   10/25/2015 0.53 (L) 0.60 - 1.10 mg/dL Final       No results found for: CHOL, HDL, LDLCALC, TRIG    Lab Results   Component Value Date    ALT 15 10/25/2015    AST 32 10/25/2015         Current Medications     Outpatient Medications Prior to Visit   Medication Sig Dispense Refill     acetone, urine, test (ACETONE, URINE, TEST) Strp Use 1 each As Directed daily before breakfast. 50 strip 3     blood glucose test strips Use 1 each As Directed 4 (four) times a day. 100 strip 3     fluticasone (FLONASE) 50 mcg/actuation nasal spray 2 sprays into each nostril daily. 10 g 11     insulin aspart U-100 (NOVOLOG FLEXPEN U-100 INSULIN) 100 unit/mL injection pen Three times per day with meals. 22 units with breakfast, 18 units with lunch, 16 units with dinner (Patient taking differently: Three times per day with meals. 32 units with breakfast, 26 units with lunch, 30 units with dinner) 30 mL 1  "    insulin glargine (LANTUS; BASAGLAR) 100 unit/mL (3 mL) pen Inject 50 Units under the skin at bedtime. 5 adj dose pen 0     lancets (ONETOUCH DELICA LANCETS) 33 gauge Misc 1 each by In Vitro route 4 (four) times a day. 100 each 3     pen needle, diabetic (BD ULTRA-FINE AUGUSTO PEN NEEDLES) 32 gauge x 5/32\" Ndle Inject 1 each under the skin 4 (four) times a day. 100 each 3     prenat.vits,naveen,min-iron-folic (PRENATAL VITAMIN) Tab Take 1 tablet by mouth daily. 100 each 5     No facility-administered medications prior to visit.        "

## 2021-06-19 NOTE — ANESTHESIA PROCEDURE NOTES
Peripheral Block    Patient location during procedure: OR  Start time: 7/6/2018 10:51 AM  End time: 7/6/2018 10:56 AM  post-op analgesia per surgeon order as noted in medical record  Staffing:  Performing  Anesthesiologist: DANELLE HANSON  Performing CRNA: ASHLYN CONNELLY  Preanesthetic Checklist  Completed: patient identified, site marked, risks, benefits, and alternatives discussed, timeout performed, consent obtained, airway assessed, oxygen available, suction available, emergency drugs available and hand hygiene performed  Peripheral Block  Block type: other, TAP  Prep: ChloraPrep  Patient position: supine  Patient monitoring: blood pressure, heart rate, continuous pulse oximetry and cardiac monitor  Laterality: bilateral, same technique used bilaterally  Injection technique: ultrasound guided    Ultrasound used to visualize needle placement in proximity to nerve being blocked: yes   Permanent ultrasound image captured for medical record      Needle  Needle type: Stimuplex   Needle gauge: 21 G  Needle length: 4 in  no peripheral nerve catheter placed  Assessment  Injection assessment: no difficulty with injection, negative aspiration for heme, no paresthesia on injection and incremental injection

## 2021-06-19 NOTE — PROGRESS NOTES
Blood glucose from log:  AM fastin, 103, 110, 103  1 hour post meal  Breakfast: 208, 180, 150, 160  Lunch: 160, 150, 115  Dinner: 120, 180, 160    Current insulin is 64 units glargine at   Novolog  with meals per last not--log says up to 40.    Discussed my recommendation that she delivery by  section due to current GA, clinical assessment of small pelvis, hx of , and inadequately controlled GDM, despite high doses of insulin.    I would like her to have some fetal monitoring today, as this has not been done for a while.    As per previous notes, she is scheduled for c/s tomorrow at 0930.  Advised that she should arrive at 0730 having been NPO after midnight.

## 2021-06-19 NOTE — ANESTHESIA PROCEDURE NOTES
Spinal Block    Patient location during procedure: OR  Start time: 7/6/2018 9:38 AM  End time: 7/6/2018 9:42 AM  Reason for block: primary anesthetic    Staffing:  Performing  Anesthesiologist: DANELLE HANSON    Preanesthetic Checklist  Completed: patient identified, risks, benefits, and alternatives discussed, timeout performed, consent obtained, airway assessed, oxygen available, suction available, emergency drugs available and hand hygiene performed  Spinal Block  Patient position: sitting  Prep: ChloraPrep  Patient monitoring: heart rate, cardiac monitor, continuous pulse ox and blood pressure  Approach: midline  Location: L2-3  Injection technique: single-shot  Needle type: pencil-tip   Needle gauge: 25 G    Assessment  Sensory level: T6

## 2021-06-19 NOTE — ANESTHESIA CARE TRANSFER NOTE
Last vitals:   Vitals:    07/06/18 1114   BP: 106/59   Pulse: 89   Resp: 14   Temp: 36.3  C (97.4  F)   SpO2: 100%     Patient's level of consciousness is drowsy  Spontaneous respirations: yes  Maintains airway independently: yes  Dentition unchanged: yes  Oropharynx: oropharynx clear of all foreign objects    QCDR Measures:  ASA# 20 - Surgical Safety Checklist: WHO surgical safety checklist completed prior to induction  PQRS# 430 - Adult PONV Prevention: 4558F - Pt received => 2 anti-emetic agents (different classes) preop & intraop  ASA# 8 - Peds PONV Prevention: NA - Not pediatric patient, not GA or 2 or more risk factors NOT present  PQRS# 424 - Chantel-op Temp Management: 4559F - At least one body temp DOCUMENTED => 35.5C or 95.9F within required timeframe  PQRS# 426 - PACU Transfer Protocol: - Transfer of care checklist used  ASA# 14 - Acute Post-op Pain: ASA14B - Patient did NOT experience pain >= 7 out of 10

## 2021-06-19 NOTE — ANESTHESIA PREPROCEDURE EVALUATION
Anesthesia Evaluation      Patient summary reviewed   No history of anesthetic complications     Airway   Mallampati: II  Neck ROM: full   Pulmonary - negative ROS and normal exam                          Cardiovascular - normal exam  (+) hypertension (Gestational HTN), ,     (-) murmur  Rhythm: regular  Rate: normal,    no murmur      Neuro/Psych - negative ROS     Endo/Other    (+) diabetes mellitus (Gestational DM.  A1C 6.8) using insulin, obesity (BMI 37), pregnant (s/p  )     GI/Hepatic/Renal - negative ROS      Other findings:     Non-English speaking     Results for PADMAJA HERNANDEZ (MRN [id number]) as of 2018 07:33  INR: 0.95  PTT: 29  Hemoglobin: 12.7  Platelets: 307          Dental                         Anesthesia Plan  Planned anesthetic: spinal and peripheral nerve block      SAB with marcaine and fentanyl 25 mcg  TAP blocks for post-op pain as requested by surgeon  2 IVs      ASA 3       Anesthesia plan special considerations: antiemetics, IV therapy two IVs,   Post-op plan: routine recovery

## 2021-06-19 NOTE — PROGRESS NOTES
Post Partum Check:    Delivery at 38w0d now .  Date of delivery: 18 by c/s for repeat    Patient concerns: none    Bleeding: no concerns    Pain: no concerns    LMP:Patient's last menstrual period was 10/13/2017.     Depression: no concerns  EPDS Score: 0    Contraception Plan:  Depo provera.    Immunizations needed:  none    Pap smear:  Normal     Assessment:  Well post partum check up.    Plan:  depo provera today.  2 hour OGTT in 4 weeks.

## 2021-06-20 NOTE — PROGRESS NOTES
IUD Insertion Procedure Note  PREPROCEDURE  Questions:    Copper allergy? No    [place] monogamy? yes    Hx of PID/STD? no    No LMP recorded. Patient has had an injection. next depo shot due early Oct 2018.  Doesn't like weight gain associated with this.  Considering future pregnancy in 5 years?  Didn't like lack of periods with mirena.   ; last pap 2017    Current birth control: depo shots- next due 2018.  Patient Counseled Regarding:    Mechanism of action    Effectiveness rates and duration    Complications    Insertion and removal procedures  Discussed risks and benefits and written consent obtained  Patient pre-medicated with ibuprofen 600mg.  UPT Obtained, result:  No results found for this or any previous visit (from the past 24 hour(s)).    PROCEDURE    Cervix inspected. Uterus normal size and position.    Swabs obtained: none    Cervix cleansed with betadine x3    Tenaculum applied on anterior lip of cervix    Uterus sounded to 6 cm    Paragard IUD inserted    IUD strings trimmed to 3 cm    Tenaculum removed    Complications: None. No excessive pain or bleeding.    ASSESSMENT  Insertion of Paragard IUD.    PLAN    Discussed warning signs for IUD complications    RTC in 4 weeks for IUD check, and PRN    Instructed to check stings monthly, after menses    Instructed IUD must not be in place longer than10 years    Pelvic rest x 1 week

## 2021-06-20 NOTE — LETTER
Letter by Aretha Auguste RN at      Author: Aretha Auguste RN Service: -- Author Type: --    Filed:  Encounter Date: 5/21/2020 Status: (Other)       5/21/2020        Jami TERRY Paw  [address]  [place] [address]    This letter provides a written record that you were tested for COVID-19 on 5/20/2020.     Your result was positive.     This means that we found the virus that causes COVID-19 in your sample.    How can I protect others?    For safety, its very important to follow these rules.    First, stay home and away from others (self-isolate) until:      Youve had no fever--and no medicine that reduces fever--for 3 full days (72 hours). And?     Your other symptoms have gotten better. For example, your cough or breathing has improved. And?    At least 10 days have passed since your symptoms started.    During this time:      Stay in your own room (and use your own bathroom), if you can.    Stay away from others in your home. No hugging, kissing or shaking hands.    Dont let anyone visit.    Dont go to work, school or anywhere else.     Clean high touch surfaces often (doorknobs, counters, handles, etc.). Use a household cleaning spray or wipes.    Cover your mouth and nose with a mask, tissue or washcloth to avoid spreading germs.    Wash your hands and face often with soap and water.    You should not go back to work until you meet the guidelines above for ending your home isolation. You should meet these along with any other guidelines that your employer has.    Employers: This document serves as formal notice of your employees medical guidelines for going back to work. They must meet the above guidelines before going back to work in person.    How can I take care of myself?    1. Get lots of rest. Drink extra fluids (unless a doctor has told you not to).    2. Take Tylenol (acetaminophen) for fever or pain. If you have liver or kidney problems, ask your family doctor if its okay to take Tylenol.      Take either:     650 mg (two 325 mg pills) every 4 to 6 hours, or?    1,000 mg (two 500 mg pills) every 8 hours as needed.     Note: Dont take more than 3,000 mg in one day. Acetaminophen is found in many medicines (both prescribed and over-the-counter medicines). Read all labels to be sure you dont take too much.  For children, check the Tylenol bottle for the right dose. The dose is based on the shonna age or weight.    1. If you have other health problems (like cancer, heart failure, an organ transplant or severe kidney disease): Call your specialty clinic if you dont feel better in the next 2 days.    2. Know when to call 911: If your breathing is so bad that it keeps you from doing normal activities, call 911 or go to the emergency room. Tell them that youve been staying home and may have COVID-19.    3. Sign up for [employer]. We know its scary to hear that you have COVID-19. We want to track your symptoms to make sure youre okay over the next 2 weeks. Please look for an email from [employer]--this is a free, online program that well use to keep in touch. To sign up, follow the link in the email. Learn more at [email or web address].    4. Interested is participating in research? Visit the link below to view current clinical trials that apply to your situation:  [email or web address]    Where can I get more information?    To learn the [facility] guidelines for staying home, please visit the [place] Department of Health website at https://www.health.state.[place]/diseases/coronavirus/basics.html.    To learn more about COVID-19 and how to care for yourself at home, please visit the CDC website at https://www.cdc.gov/coronavirus/2019-ncov/about/steps-when-sick.html.    For more options for care at [facility], please visit our website at [email or web address]

## 2021-06-20 NOTE — PROGRESS NOTES
Subjective:  28 y.o. female with concerns of follow up on labs.  2 hour GCT following pregnancy complicated by GDM had two high values, Diagnosis of DM-II.  Has been working on losing weight but not having much success.  She is using Depo-Provera for contraception.  She is not planning another pregnancy for approximately 5 years.  Previously, she used Mirena IUD but did not like it because of changes to her menstrual cycle.    Patient reports sororal hx of elevated cholesterol.  No family hx of stroke or heart attack, though that sister has heart failure.    Outpatient Medications Prior to Visit   Medication Sig Dispense Refill     ibuprofen (ADVIL,MOTRIN) 600 MG tablet Take 1 tablet (600 mg total) by mouth every 6 (six) hours as needed for pain. 30 tablet 2     Facility-Administered Medications Prior to Visit   Medication Dose Route Frequency Provider Last Rate Last Dose     medroxyPROGESTERone injection 150 mg (DEPO-PROVERA)  150 mg Intramuscular Q3 Months Wai Macias MD   150 mg at 08/07/18 1403      History   Smoking Status     Never Smoker   Smokeless Tobacco     Never Used      Objective:  /80 (Patient Site: Right Arm, Patient Position: Sitting, Cuff Size: Adult Regular)  Pulse 80  Wt 154 lb (69.9 kg)  LMP Comment: LMP 8/2018  Breastfeeding? No  BMI 31.1 kg/m2  GENERAL: alert, not distressed  EYES: PERRL/EOMI, no scleral icterus, no conjunctival injection EARS: normal tympanic membranes and external auditory canals bilaterally  PHARYNX: no erythema or exudates  MOUTH: well hydrated mucosa, no lesions  NECK: no lymphadenopathy or thyroid nodules  CHEST: clear, no rales, rhonchi, or wheezes  CARDIAC: regular without murmur, gallop, or rub  ABDOMEN: obese, soft, non tender, non distended, normal bowel sounds    Recent Results (from the past 24 hour(s))   Glycosylated Hemoglobin A1c   Result Value Ref Range    Hemoglobin A1c 6.9 (H) 3.5 - 6.0 %   Lipid Cascade FASTING   Result Value Ref Range     Cholesterol 236 (H) <=199 mg/dL    Triglycerides 169 (H) <=149 mg/dL    HDL Cholesterol 41 (L) >=50 mg/dL    LDL Calculated 161 (H) <=129 mg/dL    Patient Fasting > 8hrs? Yes    Comprehensive Metabolic Panel   Result Value Ref Range    Sodium 138 136 - 145 mmol/L    Potassium 4.4 3.5 - 5.0 mmol/L    Chloride 105 98 - 107 mmol/L    CO2 22 22 - 31 mmol/L    Anion Gap, Calculation 11 5 - 18 mmol/L    Glucose 130 (H) 70 - 125 mg/dL    BUN 11 8 - 22 mg/dL    Creatinine 0.69 0.60 - 1.10 mg/dL    GFR MDRD Af Amer >60 >60 mL/min/1.73m2    GFR MDRD Non Af Amer >60 >60 mL/min/1.73m2    Bilirubin, Total 1.5 (H) 0.0 - 1.0 mg/dL    Calcium 10.1 8.5 - 10.5 mg/dL    Protein, Total 8.2 (H) 6.0 - 8.0 g/dL    Albumin 4.3 3.5 - 5.0 g/dL    Alkaline Phosphatase 100 45 - 120 U/L    AST 42 (H) 0 - 40 U/L    ALT 59 (H) 0 - 45 U/L      Assessment and Plan:   1. Diabetes mellitus, type 2 (H)  Adequate control.  Risk of fertility, so statin and ACE not started.    2. Needs flu shot  -ordered    3. Class 1 obesity due to excess calories with serious comorbidity and body mass index (BMI) of 30.0 to 30.9 in adult  Weight reduction discussed.  Would change contraception.  Reviewed options.  Paragard IUD scheduled.    4. Elevated liver enzymes  -suspect due to obesity and PIZANO  Neg hep B s Ag during pregnancy

## 2021-06-21 NOTE — LETTER
Letter by Veronica Gant RN at      Author: Veronica Gant RN Service: -- Author Type: --    Filed:  Encounter Date: 2/1/2021 Status: (Other)         Jami Prince  [address] [place] [address]             February 1, 2021         Dear Ms. Prince,    We are happy to inform you that your recent Pap smear and Human Papillomavirus (HPV) test results are normal and negative.    It is recommended that you have your next Pap smear and Human Papillomavirus (HPV) test in 5 years. You will also need to return to the clinic every year for an annual wellness visit.    If you have additional questions regarding this result, please contact our office and we will be happy to assist you.      Sincerely,    Your [facility] Care Team

## 2021-06-21 NOTE — PROGRESS NOTES
[facility] Office Visit    Chief Complaint:  Chief Complaint   Patient presents with     Follow-up     f/u recent IUD           Assessment/Plan:  1. IUD check up  Doing well with IUD no concerns.  IUD strings visualized.  Recommend annual follow-up sooner if any signs or symptoms of IUD migration present.    2. Type 2 diabetes mellitus without complication, without long-term current use of insulin (H)  Follow-up with your primary care provider in 3 months for diabetes checkup today and microalbumin  - Microalbumin, Random Urine      Return in about 3 months (around 1/30/2019) for Recheck.    Patient Education/AVS:  There are no Patient Instructions on file for this visit.    HPI:   Jami Prince is a 28 y.o. female c/o f/u on her recent paragard IUD insertion 9/28/18.  Has diabetes reviewed last set of lab tests.  Reviewed need for eye exam- will have her regular  schedule this for her.  Reviewed f/u with Dr Macias in January.   Due for UMAR.      Had some cramping for 1st week after IUD placed but after that the pain is better.  Has been bleeding since IUD was placed.  Depo shot expires next month.  Some days are spotting and some days are heavy.  On heavy days has to use thick pads and change 2-3x/day.  Not bleeding that heavy overall.      ROS:  Constitutional, CV, Resp, GI, , MSK, skin, neuro, psych all negative except as outlined in the HPI above and patient denies any other symptoms.      Physical Exam:  /86 (Patient Site: Left Arm, Patient Position: Sitting, Cuff Size: Adult Regular)   Pulse 84   Resp 16   Wt 156 lb 4 oz (70.9 kg)   Breastfeeding? No   BMI 31.56 kg/m   Body mass index is 31.56 kg/m . No LMP recorded. Patient has had an injection.  Vital signs reviewed  Wt Readings from Last 3 Encounters:   10/30/18 156 lb 4 oz (70.9 kg)   09/28/18 156 lb 12 oz (71.1 kg)   09/24/18 154 lb (69.9 kg)     Social History     Tobacco Use   Smoking Status Never Smoker    Smokeless Tobacco Never Used     Social History     Substance and Sexual Activity   Sexual Activity Yes     Partners: Male     Birth control/protection: None     No Data Recorded  No Data Recorded  PHQ-2 Total Score: 0 (9/24/2018 10:42 AM)    No Data Recorded    All normal as below except abnormalities include: GYN:  Normal external genitalia.  Healthy normal vaginal mucosa.  Health appearing cervix.  Testing obtained without pain or difficulty.  Normal bimanual exam.  IUD strings visualized from the cervical eyes appears normal no concerns    General is a  28 y.o. female sitting comfortably in no apparent distress.   Neuro/MSK: Able to ambulate around the exam room with equal movement, strength and normal coordination of the upper and lower extremeties symmetrically    Results for orders placed or performed in visit on 10/30/18   Microalbumin, Random Urine   Result Value Ref Range    Microalbumin, Random Urine 2.53 (H) 0.00 - 1.99 mg/dL    Creatinine, Urine 63.5 mg/dL    Microalbumin/Creatinine Ratio Random Urine 39.8 (H) <=19.9 mg/g       Med list and active problem list reviewed and updated as part of this encounter    Current Outpatient Medications on File Prior to Visit   Medication Sig Dispense Refill     ibuprofen (ADVIL,MOTRIN) 600 MG tablet Take 1 tablet (600 mg total) by mouth every 6 (six) hours as needed for pain. 30 tablet 2     No current facility-administered medications on file prior to visit.          Asia Morgan MD

## 2021-06-21 NOTE — LETTER
Letter by Wai Macias MD at      Author: Wai Macias MD Service: -- Author Type: --    Filed:  Encounter Date: 4/29/2021 Status: (Other)       Parent/guardian of Jami Prince  [address] [place] [address]             May 6, 2021         Dear Jami Suresh,    Below are the results from Jami's recent visit:    Resulted Orders   Glycosylated Hemoglobin A1c   Result Value Ref Range    Hemoglobin A1c 8.9 (H) <=5.6 %   Basic Metabolic Panel   Result Value Ref Range    Sodium 135 (L) 136 - 145 mmol/L    Potassium 4.2 3.5 - 5.0 mmol/L    Chloride 102 98 - 107 mmol/L    CO2 20 (L) 22 - 31 mmol/L    Anion Gap, Calculation 13 5 - 18 mmol/L    Glucose 234 (H) 70 - 125 mg/dL    Calcium 9.3 8.5 - 10.5 mg/dL    BUN 14 8 - 22 mg/dL    Creatinine 0.71 0.60 - 1.10 mg/dL    GFR MDRD Af Amer >60 >60 mL/min/1.73m2    GFR MDRD Non Af Amer >60 >60 mL/min/1.73m2    Narrative    Fasting Glucose reference range is 70-99 mg/dL per  American Diabetes Association (ADA) guidelines.   Hepatic Profile   Result Value Ref Range    Bilirubin, Total 0.6 0.0 - 1.0 mg/dL    Bilirubin, Direct 0.2 <=0.5 mg/dL    Protein, Total 7.6 6.0 - 8.0 g/dL    Albumin 3.9 3.5 - 5.0 g/dL    Alkaline Phosphatase 106 45 - 120 U/L    AST 88 (H) 0 - 40 U/L     (H) 0 - 45 U/L         Please call with questions or contact us using [employer].    Sincerely,        Electronically signed by Wai Macias MD

## 2021-06-22 NOTE — TELEPHONE ENCOUNTER
Patient is needing a ride for their appointment on:    Date: 4/3/2019  Time: 11:00    Name of Location/Address/Phone #:   [address] [address]  PH: [phone]      Passenger (s) : 1    Special considerations: None          Please call patient back to confirm the ride details. Thanks!

## 2021-06-22 NOTE — PROGRESS NOTES
Subjective:  28 y.o. female with concerns of follow up on diabetes.  Does not have concerns with current IUD.  Wakes at night to urinate at least 1 time.  Sometimes more.  Maybe a little excess thirst.  Not checking blood sugars.  Does not exercise other than in caring for her children.      Outpatient Medications Prior to Visit   Medication Sig Dispense Refill     ibuprofen (ADVIL,MOTRIN) 600 MG tablet Take 1 tablet (600 mg total) by mouth every 6 (six) hours as needed for pain. 30 tablet 2     No facility-administered medications prior to visit.       Social History     Tobacco Use   Smoking Status Never Smoker   Smokeless Tobacco Never Used      Objective:  /82 (Patient Site: Right Arm, Patient Position: Sitting, Cuff Size: Adult Regular)   Pulse 88   Temp 97.1  F (36.2  C) (Oral)    Body mass index is 31.51 kg/m .  GENERAL: alert, not distressed  CHEST: clear, no rales, rhonchi, or wheezes  CARDIAC: regular without murmur, gallop, or rub  ABDOMEN: soft, non tender, non distended, normal bowel sounds    Recent Results (from the past 24 hour(s))   Glycosylated Hemoglobin A1c   Result Value Ref Range    Hemoglobin A1c 7.1 (H) 3.5 - 6.0 %   HM2(CBC w/o Differential)   Result Value Ref Range    WBC 6.6 4.0 - 11.0 thou/uL    RBC 5.79 (H) 3.80 - 5.40 mill/uL    Hemoglobin 15.2 12.0 - 16.0 g/dL    Hematocrit 46.8 35.0 - 47.0 %    MCV 81 80 - 100 fL    MCH 26.3 (L) 27.0 - 34.0 pg    MCHC 32.6 32.0 - 36.0 g/dL    RDW 11.3 11.0 - 14.5 %    Platelets 347 140 - 440 thou/uL    MPV 7.7 7.0 - 10.0 fL      Assessment and Plan:   1. Type 2 diabetes mellitus without complication, without long-term current use of insulin (H)  Discussed medicine start.  Will hold off on ACE, statin due to fertility potential and young age.  - Ambulatory referral to Ophthalmology  - Microalbumin, Random Urine  - metFORMIN (GLUCOPHAGE) 500 MG tablet; Take 1 tablet (500 mg total) by mouth 2 (two) times a day with meals.  Dispense: 180 tablet;  Refill: 3  - Pneumococcal polysaccharide vaccine 23-valent 1 yo or older, subq/IM    2. Elevated liver enzymes  Possibly due to elevated blood sugars,.  Will recheck and check for infections as below.  - Hepatitis B Surface Antigen (HBsAG)  - Hepatitis B Surface Antibody (Anti-HBs)  - Hepatitis C Antibody (Anti-HCV)  - Hepatitis A Antibody, IgG (Anti-HAV, IgG)  - Comprehensive Metabolic Panel    3. Class 1 obesity due to excess calories with serious comorbidity and body mass index (BMI) of 30.0 to 30.9 in adult  Daily exercise is recommended.    4. History of anemia  Appears to be resolved.    This visit was conducted with the aid of a professional .

## 2021-06-25 NOTE — TELEPHONE ENCOUNTER
----- Message from Wai Macias MD sent at 5/30/2021 11:58 AM CDT -----  Please call to schedule II month follow-up.

## 2021-06-25 NOTE — PROGRESS NOTES
Subjective:  31 y.o. female with concerns of diabetes follow-up.  Last month hemoglobin A1c 8.9.  Newly started glipizide.  Reports taking medication for about 1 week.  Still feels some vaginal itching but improved after use of fluconazole.  Previously she had thought about taking IUD out but now she wants to have that stay because the symptoms are better.  She is not checking blood sugars.  Denies excessive thirst or urination.  Has been working on eating less rice.  Outpatient Medications Prior to Visit   Medication Sig Dispense Refill     albuterol (PROAIR HFA;PROVENTIL HFA;VENTOLIN HFA) 90 mcg/actuation inhaler Inhale 2 puffs every 6 (six) hours as needed for wheezing. 1 each 2     budesonide-formoteroL (SYMBICORT) 160-4.5 mcg/actuation inhaler Inhale 2 puffs 2 (two) times a day. 1 Inhaler 12     fluconazole (DIFLUCAN) 150 MG tablet Take 1 tablet (150 mg total) by mouth once a week. 2 tablet 0     glipiZIDE (GLUCOTROL) 5 MG tablet Take 1 tablet (5 mg total) by mouth 2 (two) times a day before meals. 180 tablet 0     metFORMIN (GLUCOPHAGE) 1000 MG tablet Take 1 tablet (1,000 mg total) by mouth 2 (two) times a day with meals. 180 tablet 1     No facility-administered medications prior to visit.       Social History     Tobacco Use   Smoking Status Never Smoker   Smokeless Tobacco Current User   Tobacco Comment    BETTLE NUT      Objective:  BP (!) 130/93 (Patient Site: Left Arm, Patient Position: Sitting, Cuff Size: Adult Regular)   Pulse 94   Temp 97.9  F (36.6  C) (Temporal)   Wt 160 lb 4 oz (72.7 kg)   LMP 05/24/2021 (Exact Date)   BMI 33.10 kg/m    GENERAL: alert, not distressed  CHEST: clear, no rales, rhonchi, or wheezes  CARDIAC: regular without murmur, gallop, or rub  ABDOMEN: soft, non tender, non distended, normal bowel sounds     Recent Results (from the past 240 hour(s))   Glycosylated Hemoglobin A1c    Collection Time: 05/26/21  9:49 AM   Result Value Ref Range    Hemoglobin A1c 8.8 (H) <=5.6 %         Assessment and Plan:   1. Type 2 diabetes mellitus without complication, without long-term current use of insulin (H)  Not much change in A1c.  Recommend increasing glipizide to 10 mg (2 tablets) twice daily with this.  - Glycosylated Hemoglobin A1c    - metFORMIN (GLUCOPHAGE) 1000 MG tablet; Take 1 tablet (1,000 mg total) by mouth 2 (two) times a day with meals.  Dispense: 180 tablet; Refill: 1     Coronavirus vaccination recommended.  Appointment scheduled.    2-month follow-up.

## 2021-06-25 NOTE — TELEPHONE ENCOUNTER
I spoke with quinten  from [facility].    Patient has been scheduled with [phone] for a  time of [id number]  round trip.   TRIP ID#:  N/A

## 2021-06-25 NOTE — TELEPHONE ENCOUNTER
Used Language Line/  Name: elisha  ID: [id number]      Patient is informed of the message and has no further questions.    Completing task.

## 2021-06-25 NOTE — TELEPHONE ENCOUNTER
----- Message from Wai Macias MD sent at 5/30/2021 11:59 AM CDT -----  Call:  There has not been much change in your blood sugar control.  At this time, I recommend increasing to 2 tablets (10 mg) glipizide 2 times per day.  Take this with meals.  We should have a follow-up in clinic in 2 months.

## 2021-07-03 NOTE — ADDENDUM NOTE
Addendum Note by Adela Omer MD at 7/6/2018 10:11 PM     Author: Adela Omer MD Service: -- Author Type: Physician    Filed: 7/6/2018 10:11 PM Date of Service: 7/6/2018 10:11 PM Status: Signed    : Adela Omer MD (Physician)       Addendum  created 07/06/18 2211 by Adela Omer MD    Sign clinical note

## 2021-07-03 NOTE — ADDENDUM NOTE
Addendum Note by Wai Velez MD at 1/21/2021  3:00 PM     Author: Wai Velez MD Service: -- Author Type: Physician    Filed: 1/22/2021  8:53 AM Encounter Date: 1/21/2021 Status: Signed    : Wai Velez MD (Physician)    Addended by: WAI VELEZ on: 1/22/2021 08:53 AM        Modules accepted: Orders

## 2021-07-04 NOTE — LETTER
Letter by Wai Macias MD at      Author: Wai Macias MD Service: -- Author Type: --    Filed:  Encounter Date: 6/1/2021 Status: (Other)         Jami TERRY Suresh  [address] [place] [address]             June 4, 2021        Dear Ms. Prince,    Below are the results from your recent visit:     There has not been much change in your blood sugar control.  At this time, I recommend increasing to  2 tablets (10 mg) glipizide 2 times per day.  Take this with meals.  We should have a follow-up in clinic  in 2 months.    Resulted Orders   Glycosylated Hemoglobin A1c   Result Value Ref Range    Hemoglobin A1c 8.8 (H) <=5.6 %            Please call with questions or contact us using [employer].    Sincerely,        Electronically signed by Wai Macias MD

## 2021-07-06 VITALS
DIASTOLIC BLOOD PRESSURE: 93 MMHG | WEIGHT: 160.25 LBS | HEART RATE: 94 BPM | TEMPERATURE: 97.9 F | BODY MASS INDEX: 33.1 KG/M2 | SYSTOLIC BLOOD PRESSURE: 130 MMHG

## 2021-07-14 PROBLEM — Z34.90 PREGNANCY: Status: RESOLVED | Noted: 2018-07-06 | Resolved: 2018-07-09

## 2021-07-14 PROBLEM — Z34.90 NORMAL PREGNANCY: Status: RESOLVED | Noted: 2018-01-10 | Resolved: 2018-07-06

## 2021-07-14 PROBLEM — O24.419 GDM, CLASS A2: Status: RESOLVED | Noted: 2018-04-06 | Resolved: 2018-09-24

## 2021-08-09 ENCOUNTER — OFFICE VISIT (OUTPATIENT)
Dept: FAMILY MEDICINE | Facility: CLINIC | Age: 31
End: 2021-08-09
Payer: COMMERCIAL

## 2021-08-09 VITALS
DIASTOLIC BLOOD PRESSURE: 81 MMHG | SYSTOLIC BLOOD PRESSURE: 126 MMHG | HEART RATE: 116 BPM | WEIGHT: 159 LBS | BODY MASS INDEX: 32.84 KG/M2

## 2021-08-09 DIAGNOSIS — B37.31 YEAST INFECTION OF THE VAGINA: ICD-10-CM

## 2021-08-09 DIAGNOSIS — E11.65 TYPE 2 DIABETES MELLITUS WITH HYPERGLYCEMIA, WITHOUT LONG-TERM CURRENT USE OF INSULIN (H): Primary | ICD-10-CM

## 2021-08-09 LAB
ANION GAP SERPL CALCULATED.3IONS-SCNC: 10 MMOL/L (ref 5–18)
BUN SERPL-MCNC: 8 MG/DL (ref 8–22)
CALCIUM SERPL-MCNC: 9.2 MG/DL (ref 8.5–10.5)
CHLORIDE BLD-SCNC: 96 MMOL/L (ref 98–107)
CO2 SERPL-SCNC: 23 MMOL/L (ref 22–31)
CREAT SERPL-MCNC: 0.81 MG/DL (ref 0.6–1.1)
GFR SERPL CREATININE-BSD FRML MDRD: >90 ML/MIN/1.73M2
GLUCOSE BLD-MCNC: 316 MG/DL (ref 70–125)
HBA1C MFR BLD: 9.2 % (ref 0–5.6)
POTASSIUM BLD-SCNC: 3.8 MMOL/L (ref 3.5–5)
SODIUM SERPL-SCNC: 129 MMOL/L (ref 136–145)

## 2021-08-09 PROCEDURE — 80048 BASIC METABOLIC PNL TOTAL CA: CPT | Performed by: FAMILY MEDICINE

## 2021-08-09 PROCEDURE — 36415 COLL VENOUS BLD VENIPUNCTURE: CPT | Performed by: FAMILY MEDICINE

## 2021-08-09 PROCEDURE — 83036 HEMOGLOBIN GLYCOSYLATED A1C: CPT | Performed by: FAMILY MEDICINE

## 2021-08-09 PROCEDURE — 99214 OFFICE O/P EST MOD 30 MIN: CPT | Performed by: FAMILY MEDICINE

## 2021-08-09 RX ORDER — FLUCONAZOLE 150 MG/1
TABLET ORAL
COMMUNITY
Start: 2021-04-28 | End: 2021-08-09

## 2021-08-09 RX ORDER — METRONIDAZOLE 500 MG/1
TABLET ORAL
COMMUNITY
Start: 2021-01-22 | End: 2021-08-09

## 2021-08-09 RX ORDER — EMPAGLIFLOZIN 25 MG/1
TABLET, FILM COATED ORAL
COMMUNITY
Start: 2021-03-10 | End: 2021-08-09

## 2021-08-09 RX ORDER — FLUCONAZOLE 150 MG/1
150 TABLET ORAL
Qty: 2 TABLET | Refills: 0 | Status: SHIPPED | OUTPATIENT
Start: 2021-08-09 | End: 2021-11-16

## 2021-08-09 RX ORDER — GLIPIZIDE 10 MG/1
10 TABLET ORAL
Qty: 180 TABLET | Refills: 0 | Status: SHIPPED | OUTPATIENT
Start: 2021-08-09 | End: 2021-10-25

## 2021-08-09 RX ORDER — GLIPIZIDE 5 MG/1
10 TABLET ORAL
Qty: 180 TABLET | Refills: 0 | Status: SHIPPED | OUTPATIENT
Start: 2021-08-09 | End: 2021-08-09

## 2021-08-09 ASSESSMENT — ASTHMA QUESTIONNAIRES
QUESTION_5 LAST FOUR WEEKS HOW WOULD YOU RATE YOUR ASTHMA CONTROL: COMPLETELY CONTROLLED
QUESTION_3 LAST FOUR WEEKS HOW OFTEN DID YOUR ASTHMA SYMPTOMS (WHEEZING, COUGHING, SHORTNESS OF BREATH, CHEST TIGHTNESS OR PAIN) WAKE YOU UP AT NIGHT OR EARLIER THAN USUAL IN THE MORNING: NOT AT ALL
QUESTION_1 LAST FOUR WEEKS HOW MUCH OF THE TIME DID YOUR ASTHMA KEEP YOU FROM GETTING AS MUCH DONE AT WORK, SCHOOL OR AT HOME: ALL OF THE TIME
QUESTION_2 LAST FOUR WEEKS HOW OFTEN HAVE YOU HAD SHORTNESS OF BREATH: NOT AT ALL
ACT_TOTALSCORE: 20
QUESTION_4 LAST FOUR WEEKS HOW OFTEN HAVE YOU USED YOUR RESCUE INHALER OR NEBULIZER MEDICATION (SUCH AS ALBUTEROL): ONCE A WEEK OR LESS

## 2021-08-09 NOTE — PROGRESS NOTES
Subjective:  31 year old female with concerns of DM follow up  Thinks things have been better  Not checking sugars  Rare polydipsia polyuria.  Taking 10 mg glipizide one time daily, rather than as rx'd    Asthma much better.    Getting some exercise.  Walking in park.    Thinks vaginal yeast infection is back.  White discharge. Itches.    Outpatient Medications Prior to Visit   Medication Sig Dispense Refill     albuterol (PROAIR HFA;PROVENTIL HFA;VENTOLIN HFA) 90 mcg/actuation inhaler [ALBUTEROL (PROAIR HFA;PROVENTIL HFA;VENTOLIN HFA) 90 MCG/ACTUATION INHALER] Inhale 2 puffs every 6 (six) hours as needed for wheezing. 1 each 2     budesonide-formoteroL (SYMBICORT) 160-4.5 mcg/actuation inhaler [BUDESONIDE-FORMOTEROL (SYMBICORT) 160-4.5 MCG/ACTUATION INHALER] Inhale 2 puffs 2 (two) times a day. 1 Inhaler 12     metFORMIN (GLUCOPHAGE) 1000 MG tablet [METFORMIN (GLUCOPHAGE) 1000 MG TABLET] Take 1 tablet (1,000 mg total) by mouth 2 (two) times a day with meals. 180 tablet 1     fluconazole (DIFLUCAN) 150 MG tablet        glipiZIDE (GLUCOTROL) 5 MG tablet [GLIPIZIDE (GLUCOTROL) 5 MG TABLET] Take 1 tablet (5 mg total) by mouth 2 (two) times a day before meals. 180 tablet 0     JARDIANCE 25 MG TABS tablet        metroNIDAZOLE (FLAGYL) 500 MG tablet        No facility-administered medications prior to visit.      History   Smoking Status     Never Smoker   Smokeless Tobacco     Current User     Comment: BETTLE NUT      Objective:  /81 (BP Location: Right arm, Patient Position: Sitting, Cuff Size: Adult Regular)   Pulse 116   Wt 72.1 kg (159 lb)   LMP 08/09/2021   BMI 32.84 kg/m    GENERAL: alert, not distressed  CHEST: clear, no rales, rhonchi, or wheezes  CARDIAC: regular without murmur, gallop, or rub  ABDOMEN: soft, non tender, non distended, normal bowel sounds    Recent Results (from the past 24 hour(s))   Hemoglobin A1c    Collection Time: 08/09/21  2:32 PM   Result Value Ref Range    Hemoglobin A1C 9.2 (H)  0.0 - 5.6 %      Assessment and Plan:   1. Type 2 diabetes mellitus with hyperglycemia, without long-term current use of insulin (H)  Control worse and out of range.  Advised to increase JONES to 10 mg BID   Recheck in 6 weeks.  - Hemoglobin A1c; Future  - Basic metabolic panel  (Ca, Cl, CO2, Creat, Gluc, K, Na, BUN); Future  - Hemoglobin A1c  - Basic metabolic panel  (Ca, Cl, CO2, Creat, Gluc, K, Na, BUN)  - glipiZIDE (GLUCOTROL) 10 MG tablet; Take 1 tablet (10 mg) by mouth 2 times daily (before meals)  Dispense: 180 tablet; Refill: 0    2. Yeast infection of the vagina  Treat twice  Discussed decreasing sugars can help as well.  - fluconazole (DIFLUCAN) 150 MG tablet; Take 1 tablet (150 mg) by mouth every 7 days  Dispense: 2 tablet; Refill: 0

## 2021-08-10 ASSESSMENT — ASTHMA QUESTIONNAIRES: ACT_TOTALSCORE: 20

## 2021-10-25 ENCOUNTER — TELEPHONE (OUTPATIENT)
Dept: FAMILY MEDICINE | Facility: CLINIC | Age: 31
End: 2021-10-25

## 2021-10-25 DIAGNOSIS — E11.65 TYPE 2 DIABETES MELLITUS WITH HYPERGLYCEMIA, WITHOUT LONG-TERM CURRENT USE OF INSULIN (H): ICD-10-CM

## 2021-10-25 DIAGNOSIS — E11.9 TYPE 2 DIABETES MELLITUS WITHOUT COMPLICATION, WITHOUT LONG-TERM CURRENT USE OF INSULIN (H): ICD-10-CM

## 2021-10-25 RX ORDER — GLIPIZIDE 10 MG/1
10 TABLET ORAL
Qty: 180 TABLET | Refills: 0 | Status: SHIPPED | OUTPATIENT
Start: 2021-10-25 | End: 2022-02-01

## 2021-10-25 NOTE — TELEPHONE ENCOUNTER
Left message the help of .    Okay to relay message below.    Elvia Lynch RN   [facility]/Triage Nurse

## 2021-10-25 NOTE — TELEPHONE ENCOUNTER
Reason for Call:  Medication or medication refill: glucose tab and one other for diabetes, pt didn't know the name     Do you use a [facility]?  Name of the pharmacy and phone number for the current request:  [facility] [id number], [place], [place] [id number] [phone]    Name of the medication requested: Glucose tabs and one other for diabetes, pt doesn't know the name     Other request: Please call pt when able to  at [phone]    Can we leave a detailed message on this number? YES    Phone number patient can be reached at: Home number on file [phone] (home)    Best Time: ANY    Call taken on 10/25/2021 at 1:18 PM by Little Brown

## 2021-11-16 ENCOUNTER — OFFICE VISIT (OUTPATIENT)
Dept: FAMILY MEDICINE | Facility: CLINIC | Age: 31
End: 2021-11-16
Payer: COMMERCIAL

## 2021-11-16 VITALS
HEART RATE: 87 BPM | RESPIRATION RATE: 12 BRPM | HEIGHT: 58 IN | BODY MASS INDEX: 33.8 KG/M2 | DIASTOLIC BLOOD PRESSURE: 82 MMHG | WEIGHT: 161 LBS | SYSTOLIC BLOOD PRESSURE: 128 MMHG

## 2021-11-16 DIAGNOSIS — H61.22 IMPACTED CERUMEN OF LEFT EAR: ICD-10-CM

## 2021-11-16 DIAGNOSIS — E11.9 TYPE 2 DIABETES MELLITUS WITHOUT COMPLICATION, WITHOUT LONG-TERM CURRENT USE OF INSULIN (H): Primary | ICD-10-CM

## 2021-11-16 DIAGNOSIS — R74.8 ELEVATED LIVER ENZYMES: ICD-10-CM

## 2021-11-16 DIAGNOSIS — E66.811 CLASS 1 OBESITY DUE TO EXCESS CALORIES WITH SERIOUS COMORBIDITY AND BODY MASS INDEX (BMI) OF 30.0 TO 30.9 IN ADULT: ICD-10-CM

## 2021-11-16 DIAGNOSIS — E66.09 CLASS 1 OBESITY DUE TO EXCESS CALORIES WITH SERIOUS COMORBIDITY AND BODY MASS INDEX (BMI) OF 30.0 TO 30.9 IN ADULT: ICD-10-CM

## 2021-11-16 LAB
ALT SERPL W P-5'-P-CCNC: 71 U/L (ref 0–45)
ANION GAP SERPL CALCULATED.3IONS-SCNC: 9 MMOL/L (ref 5–18)
BUN SERPL-MCNC: 8 MG/DL (ref 8–22)
CALCIUM SERPL-MCNC: 10 MG/DL (ref 8.5–10.5)
CHLORIDE BLD-SCNC: 103 MMOL/L (ref 98–107)
CHOLEST SERPL-MCNC: 241 MG/DL
CO2 SERPL-SCNC: 25 MMOL/L (ref 22–31)
CREAT SERPL-MCNC: 0.67 MG/DL (ref 0.6–1.1)
FASTING STATUS PATIENT QL REPORTED: ABNORMAL
GFR SERPL CREATININE-BSD FRML MDRD: >90 ML/MIN/1.73M2
GLUCOSE BLD-MCNC: 133 MG/DL (ref 70–125)
HBA1C MFR BLD: 7.7 % (ref 0–5.6)
HDLC SERPL-MCNC: 52 MG/DL
LDLC SERPL CALC-MCNC: 157 MG/DL
POTASSIUM BLD-SCNC: 4 MMOL/L (ref 3.5–5)
SODIUM SERPL-SCNC: 137 MMOL/L (ref 136–145)
TRIGL SERPL-MCNC: 158 MG/DL

## 2021-11-16 PROCEDURE — 80061 LIPID PANEL: CPT | Performed by: FAMILY MEDICINE

## 2021-11-16 PROCEDURE — 90471 IMMUNIZATION ADMIN: CPT | Performed by: FAMILY MEDICINE

## 2021-11-16 PROCEDURE — 80048 BASIC METABOLIC PNL TOTAL CA: CPT | Performed by: FAMILY MEDICINE

## 2021-11-16 PROCEDURE — 83036 HEMOGLOBIN GLYCOSYLATED A1C: CPT | Performed by: FAMILY MEDICINE

## 2021-11-16 PROCEDURE — 90686 IIV4 VACC NO PRSV 0.5 ML IM: CPT | Performed by: FAMILY MEDICINE

## 2021-11-16 PROCEDURE — 36415 COLL VENOUS BLD VENIPUNCTURE: CPT | Performed by: FAMILY MEDICINE

## 2021-11-16 PROCEDURE — 99214 OFFICE O/P EST MOD 30 MIN: CPT | Mod: 25 | Performed by: FAMILY MEDICINE

## 2021-11-16 PROCEDURE — 84460 ALANINE AMINO (ALT) (SGPT): CPT | Performed by: FAMILY MEDICINE

## 2021-11-16 ASSESSMENT — MIFFLIN-ST. JEOR: SCORE: 1329.29

## 2021-11-16 NOTE — PROGRESS NOTES
"Subjective:  31 year old female with concerns of diabetes follow-up.  Think sugars have been better.  Wakes to urinate still.  She is not checking blood sugars.  Not much for exercise.  Does not have excessive thirst.  Informs current medication of glipizide and Metformin  Now taking both of those twice per day.    Has sensation of foreign body in left ear.  Tries to scratch it out.  Present for about a month.    Outpatient Medications Prior to Visit   Medication Sig Dispense Refill     albuterol (PROAIR HFA;PROVENTIL HFA;VENTOLIN HFA) 90 mcg/actuation inhaler [ALBUTEROL (PROAIR HFA;PROVENTIL HFA;VENTOLIN HFA) 90 MCG/ACTUATION INHALER] Inhale 2 puffs every 6 (six) hours as needed for wheezing. 1 each 2     budesonide-formoteroL (SYMBICORT) 160-4.5 mcg/actuation inhaler [BUDESONIDE-FORMOTEROL (SYMBICORT) 160-4.5 MCG/ACTUATION INHALER] Inhale 2 puffs 2 (two) times a day. 1 Inhaler 12     glipiZIDE (GLUCOTROL) 10 MG tablet Take 1 tablet (10 mg) by mouth 2 times daily (before meals) 180 tablet 0     metFORMIN (GLUCOPHAGE) 1000 MG tablet Take 1 tablet (1,000 mg) by mouth 2 times daily (with meals) 180 tablet 1     fluconazole (DIFLUCAN) 150 MG tablet Take 1 tablet (150 mg) by mouth every 7 days (Patient not taking: Reported on 11/16/2021) 2 tablet 0     No facility-administered medications prior to visit.      History   Smoking Status     Never Smoker   Smokeless Tobacco     Current User     Comment: BETTLE NUT      Objective:  /82 (BP Location: Left arm, Patient Position: Sitting, Cuff Size: Adult Regular)   Pulse 87   Resp 12   Ht 1.48 m (4' 10.27\")   Wt 73 kg (161 lb)   BMI 33.34 kg/m    Wt Readings from Last 3 Encounters:   11/16/21 73 kg (161 lb)   08/09/21 72.1 kg (159 lb)   05/26/21 72.7 kg (160 lb 4 oz)      GENERAL: alert, not distressed  EAR: Left EAC appears to have some dried cerumen applied to the inferior and posterior portions of the TM.  Canal otherwise normal.  TM normal.  CHEST: clear, no " rales, rhonchi, or wheezes  CARDIAC: regular without murmur, gallop, or rub  ABDOMEN: soft, non tender, non distended, normal bowel sounds    FOOT EXAM:  DP and PT pulses are normal.  Monofilament testing normal  Nails normal.  Hair growth absent.  No skin changes.     Recent Results (from the past 24 hour(s))   Hemoglobin A1c    Collection Time: 11/16/21  2:43 PM   Result Value Ref Range    Hemoglobin A1C 7.7 (H) 0.0 - 5.6 %      Assessment and Plan:   1. Type 2 diabetes mellitus without complication, without long-term current use of insulin (H)  Control is improved.  Still above goal.  Will add linagliptin  Recheck in 3 months.  - Basic metabolic panel  (Ca, Cl, CO2, Creat, Gluc, K, Na, BUN); Future  - Hemoglobin A1c; Future  - Albumin Random Urine Quantitative with Creat Ratio; Future  - REVIEW OF HEALTH MAINTENANCE PROTOCOL ORDERS  - Lipid panel reflex to direct LDL Fasting; Future  - ALT; Future  - Basic metabolic panel  (Ca, Cl, CO2, Creat, Gluc, K, Na, BUN)  - Hemoglobin A1c  - Lipid panel reflex to direct LDL Fasting  - ALT    2. Class 1 obesity due to excess calories with serious comorbidity and body mass index (BMI) of 30.0 to 30.9 in adult  Encouraged exercise    3. Impacted cerumen of left ear  Trial of med to remove was  See if symptoms improve  See back in 2-3 weeks if not better.  - carbamide peroxide (DEBROX) 6.5 % otic solution; Place 5 drops Into the left ear 2 times daily for 7 days  Dispense: 3.5 mL; Refill: 0

## 2021-11-17 ENCOUNTER — TELEPHONE (OUTPATIENT)
Dept: FAMILY MEDICINE | Facility: CLINIC | Age: 31
End: 2021-11-17
Payer: COMMERCIAL

## 2021-11-17 NOTE — TELEPHONE ENCOUNTER
RN spoke to pt spouse, Rajesh Patricio (Consent to communicate on file) regarding Dr. Macias's message below. Pt spouse verbalizes understanding and has no further questions.    DUC HarrellN, RN   [facility]    ----- Message from Wai Macias MD sent at 11/16/2021  6:03 PM CST -----    Call:Sugars improved, but still above goal of A1c less than 7.0  Would add a med  I sent a new rx for linagliptin.  Start that one time per day.  See back for check up in 3 months    Try ear med for 1-2 weeks and see if improved.  If not, see us in 2-3 weeks.

## 2021-12-23 ENCOUNTER — LAB (OUTPATIENT)
Dept: LAB | Facility: CLINIC | Age: 31
End: 2021-12-23
Attending: MEDICAL GENETICS
Payer: COMMERCIAL

## 2021-12-23 DIAGNOSIS — Z71.83 ENCOUNTER FOR NONPROCREATIVE GENETIC COUNSELING: ICD-10-CM

## 2021-12-23 DIAGNOSIS — Z71.83 ENCOUNTER FOR NONPROCREATIVE GENETIC COUNSELING: Primary | ICD-10-CM

## 2021-12-23 PROCEDURE — 36415 COLL VENOUS BLD VENIPUNCTURE: CPT

## 2022-02-01 ENCOUNTER — OFFICE VISIT (OUTPATIENT)
Dept: FAMILY MEDICINE | Facility: CLINIC | Age: 32
End: 2022-02-01
Payer: COMMERCIAL

## 2022-02-01 VITALS
BODY MASS INDEX: 34.43 KG/M2 | WEIGHT: 164 LBS | DIASTOLIC BLOOD PRESSURE: 86 MMHG | HEART RATE: 110 BPM | SYSTOLIC BLOOD PRESSURE: 126 MMHG | OXYGEN SATURATION: 97 % | HEIGHT: 58 IN

## 2022-02-01 DIAGNOSIS — Z60.3 LANGUAGE BARRIER: ICD-10-CM

## 2022-02-01 DIAGNOSIS — R53.83 OTHER FATIGUE: ICD-10-CM

## 2022-02-01 DIAGNOSIS — Z75.8 LANGUAGE BARRIER: ICD-10-CM

## 2022-02-01 DIAGNOSIS — E11.65 TYPE 2 DIABETES MELLITUS WITH HYPERGLYCEMIA, WITHOUT LONG-TERM CURRENT USE OF INSULIN (H): Primary | ICD-10-CM

## 2022-02-01 DIAGNOSIS — E11.9 TYPE 2 DIABETES MELLITUS WITHOUT COMPLICATION, WITHOUT LONG-TERM CURRENT USE OF INSULIN (H): ICD-10-CM

## 2022-02-01 LAB
ANION GAP SERPL CALCULATED.3IONS-SCNC: 13 MMOL/L (ref 5–18)
BUN SERPL-MCNC: 9 MG/DL (ref 8–22)
CALCIUM SERPL-MCNC: 9.8 MG/DL (ref 8.5–10.5)
CHLORIDE BLD-SCNC: 101 MMOL/L (ref 98–107)
CO2 SERPL-SCNC: 23 MMOL/L (ref 22–31)
CREAT SERPL-MCNC: 0.67 MG/DL (ref 0.6–1.1)
CREAT UR-MCNC: 21 MG/DL
ERYTHROCYTE [DISTWIDTH] IN BLOOD BY AUTOMATED COUNT: 15.2 % (ref 10–15)
FERRITIN SERPL-MCNC: 36 NG/ML (ref 10–130)
GFR SERPL CREATININE-BSD FRML MDRD: >90 ML/MIN/1.73M2
GLUCOSE BLD-MCNC: 157 MG/DL (ref 70–125)
HBA1C MFR BLD: 7.5 % (ref 0–5.6)
HCG UR QL: NEGATIVE
HCT VFR BLD AUTO: 40 % (ref 35–47)
HGB BLD-MCNC: 12.4 G/DL (ref 11.7–15.7)
MCH RBC QN AUTO: 23.1 PG (ref 26.5–33)
MCHC RBC AUTO-ENTMCNC: 31 G/DL (ref 31.5–36.5)
MCV RBC AUTO: 75 FL (ref 78–100)
MICROALBUMIN UR-MCNC: 0.61 MG/DL (ref 0–1.99)
MICROALBUMIN/CREAT UR: 29 MG/G CR
PLATELET # BLD AUTO: 389 10E3/UL (ref 150–450)
POTASSIUM BLD-SCNC: 3.9 MMOL/L (ref 3.5–5)
RBC # BLD AUTO: 5.36 10E6/UL (ref 3.8–5.2)
SODIUM SERPL-SCNC: 137 MMOL/L (ref 136–145)
TSH SERPL DL<=0.005 MIU/L-ACNC: 0.91 UIU/ML (ref 0.3–5)
VIT B12 SERPL-MCNC: 871 PG/ML (ref 213–816)
WBC # BLD AUTO: 10.5 10E3/UL (ref 4–11)

## 2022-02-01 PROCEDURE — 82043 UR ALBUMIN QUANTITATIVE: CPT | Performed by: FAMILY MEDICINE

## 2022-02-01 PROCEDURE — 82607 VITAMIN B-12: CPT | Performed by: FAMILY MEDICINE

## 2022-02-01 PROCEDURE — 82728 ASSAY OF FERRITIN: CPT | Performed by: FAMILY MEDICINE

## 2022-02-01 PROCEDURE — 83036 HEMOGLOBIN GLYCOSYLATED A1C: CPT | Performed by: FAMILY MEDICINE

## 2022-02-01 PROCEDURE — 36415 COLL VENOUS BLD VENIPUNCTURE: CPT | Performed by: FAMILY MEDICINE

## 2022-02-01 PROCEDURE — 80048 BASIC METABOLIC PNL TOTAL CA: CPT | Performed by: FAMILY MEDICINE

## 2022-02-01 PROCEDURE — 81025 URINE PREGNANCY TEST: CPT | Performed by: FAMILY MEDICINE

## 2022-02-01 PROCEDURE — 99214 OFFICE O/P EST MOD 30 MIN: CPT | Performed by: FAMILY MEDICINE

## 2022-02-01 PROCEDURE — 84443 ASSAY THYROID STIM HORMONE: CPT | Performed by: FAMILY MEDICINE

## 2022-02-01 PROCEDURE — 85027 COMPLETE CBC AUTOMATED: CPT | Performed by: FAMILY MEDICINE

## 2022-02-01 RX ORDER — GLIPIZIDE 10 MG/1
10 TABLET ORAL
Qty: 180 TABLET | Refills: 0 | Status: SHIPPED | OUTPATIENT
Start: 2022-02-01 | End: 2022-03-15

## 2022-02-01 RX ORDER — PIOGLITAZONEHYDROCHLORIDE 15 MG/1
15 TABLET ORAL DAILY
Qty: 90 TABLET | Refills: 0 | Status: SHIPPED | OUTPATIENT
Start: 2022-02-01 | End: 2022-07-29

## 2022-02-01 SDOH — SOCIAL STABILITY - SOCIAL INSECURITY: ACCULTURATION DIFFICULTY: Z60.3

## 2022-02-01 ASSESSMENT — MIFFLIN-ST. JEOR: SCORE: 1346.03

## 2022-02-01 NOTE — PROGRESS NOTES
Subjective:  31 year old female with concerns of DM recheck and fatigue.  Always feeling tired.  Especially when she is trying to work.  No chest pain or shortness of breath.  No excessive thirst or urination.  She took a few doses of linagliptin and then decided that she was concerned about discharge that may have caused.  She stopped it.  She did not want to restart that medicine.  She is taking Metformin and glipizide as prescribed.  States that sometimes her heart feels shaky.  She has not checked her blood sugar when that sensation happens.  She does not check blood pressure as well.  Contraception is currently IUD.  I believe it to be ParaGard/copper.  LMP in January.  She does not think she is pregnant.    Outpatient Medications Prior to Visit   Medication Sig Dispense Refill     albuterol (PROAIR HFA;PROVENTIL HFA;VENTOLIN HFA) 90 mcg/actuation inhaler [ALBUTEROL (PROAIR HFA;PROVENTIL HFA;VENTOLIN HFA) 90 MCG/ACTUATION INHALER] Inhale 2 puffs every 6 (six) hours as needed for wheezing. 1 each 2     budesonide-formoteroL (SYMBICORT) 160-4.5 mcg/actuation inhaler [BUDESONIDE-FORMOTEROL (SYMBICORT) 160-4.5 MCG/ACTUATION INHALER] Inhale 2 puffs 2 (two) times a day. (Patient taking differently: Inhale 2 puffs into the lungs daily as needed ) 1 Inhaler 12     glipiZIDE (GLUCOTROL) 10 MG tablet Take 1 tablet (10 mg) by mouth 2 times daily (before meals) 180 tablet 0     linagliptin (TRADJENTA) 5 MG TABS tablet Take 1 tablet (5 mg) by mouth daily (Patient not taking: Reported on 2/1/2022) 90 tablet 0     metFORMIN (GLUCOPHAGE) 1000 MG tablet Take 1 tablet (1,000 mg) by mouth 2 times daily (with meals) 180 tablet 1     No facility-administered medications prior to visit.      History   Smoking Status     Never Smoker   Smokeless Tobacco     Current User     Comment: BETTLE NUT      Objective:  /86 (BP Location: Right arm, Patient Position: Sitting, Cuff Size: Adult Regular)   Pulse 110   Ht 1.485 m (4'  "10.47\")   Wt 74.4 kg (164 lb)   SpO2 97%   BMI 33.73 kg/m    GENERAL: alert, not distressed  CHEST: clear, no rales, rhonchi, or wheezes  CARDIAC: regular without murmur, gallop, or rub  ABDOMEN: soft, non tender, non distended, normal bowel sounds    Recent Results (from the past 24 hour(s))   Hemoglobin A1c    Collection Time: 02/01/22  3:46 PM   Result Value Ref Range    Hemoglobin A1C 7.5 (H) 0.0 - 5.6 %   CBC with platelets    Collection Time: 02/01/22  3:46 PM   Result Value Ref Range    WBC Count 10.5 4.0 - 11.0 10e3/uL    RBC Count 5.36 (H) 3.80 - 5.20 10e6/uL    Hemoglobin 12.4 11.7 - 15.7 g/dL    Hematocrit 40.0 35.0 - 47.0 %    MCV 75 (L) 78 - 100 fL    MCH 23.1 (L) 26.5 - 33.0 pg    MCHC 31.0 (L) 31.5 - 36.5 g/dL    RDW 15.2 (H) 10.0 - 15.0 %    Platelet Count 389 150 - 450 10e3/uL   HCG qualitative urine    Collection Time: 02/01/22  3:46 PM   Result Value Ref Range    hCG Urine Qualitative Negative Negative      Assessment and Plan:   1. Type 2 diabetes mellitus with hyperglycemia, without long-term current use of insulin (H)  Will add actos as not yet in control  Has reliable contraception.  Did not tolerate linagliptin  Could also consider SGLT2  - CBC with platelets  - Basic metabolic panel  (Ca, Cl, CO2, Creat, Gluc, K, Na, BUN)  - TSH with free T4 reflex  - Vitamin B12  - HCG qualitative urine  - metFORMIN (GLUCOPHAGE) 1000 MG tablet; Take 1 tablet (1,000 mg) by mouth 2 times daily (with meals)  Dispense: 180 tablet; Refill: 1  - glipiZIDE (GLUCOTROL) 10 MG tablet; Take 1 tablet (10 mg) by mouth 2 times daily (before meals)  Dispense: 180 tablet; Refill: 0  - pioglitazone (ACTOS) 15 MG tablet; Take 1 tablet (15 mg) by mouth daily  Dispense: 90 tablet; Refill: 0    2. Other fatigue  Hgb looks ok.  MCV low.  Checking on ferritin  Advised attention to sleep quality.  - Ferritin      This visit was conducted with the aid of a professional .   "

## 2022-02-21 ENCOUNTER — TELEPHONE (OUTPATIENT)
Dept: FAMILY MEDICINE | Facility: CLINIC | Age: 32
End: 2022-02-21
Payer: COMMERCIAL

## 2022-02-21 NOTE — TELEPHONE ENCOUNTER
----- Message from Wai Macias MD sent at 2/20/2022 12:54 PM CST -----  Call:  Please schedule follow up 3 weeks

## 2022-03-15 ENCOUNTER — OFFICE VISIT (OUTPATIENT)
Dept: FAMILY MEDICINE | Facility: CLINIC | Age: 32
End: 2022-03-15
Payer: COMMERCIAL

## 2022-03-15 VITALS
RESPIRATION RATE: 16 BRPM | BODY MASS INDEX: 33.94 KG/M2 | SYSTOLIC BLOOD PRESSURE: 120 MMHG | HEART RATE: 97 BPM | WEIGHT: 165 LBS | DIASTOLIC BLOOD PRESSURE: 84 MMHG

## 2022-03-15 DIAGNOSIS — J45.30 MILD PERSISTENT ASTHMA WITHOUT COMPLICATION: ICD-10-CM

## 2022-03-15 DIAGNOSIS — Z75.8 LANGUAGE BARRIER: ICD-10-CM

## 2022-03-15 DIAGNOSIS — E11.65 TYPE 2 DIABETES MELLITUS WITH HYPERGLYCEMIA, WITHOUT LONG-TERM CURRENT USE OF INSULIN (H): Primary | ICD-10-CM

## 2022-03-15 DIAGNOSIS — Z60.3 LANGUAGE BARRIER: ICD-10-CM

## 2022-03-15 LAB — HBA1C MFR BLD: 7.1 % (ref 0–5.6)

## 2022-03-15 PROCEDURE — 99214 OFFICE O/P EST MOD 30 MIN: CPT | Performed by: FAMILY MEDICINE

## 2022-03-15 PROCEDURE — 83036 HEMOGLOBIN GLYCOSYLATED A1C: CPT | Performed by: FAMILY MEDICINE

## 2022-03-15 PROCEDURE — 86706 HEP B SURFACE ANTIBODY: CPT | Performed by: FAMILY MEDICINE

## 2022-03-15 PROCEDURE — 36415 COLL VENOUS BLD VENIPUNCTURE: CPT | Performed by: FAMILY MEDICINE

## 2022-03-15 RX ORDER — GLIPIZIDE 10 MG/1
10 TABLET ORAL
Qty: 180 TABLET | Refills: 0 | Status: SHIPPED | OUTPATIENT
Start: 2022-03-15 | End: 2022-08-02

## 2022-03-15 SDOH — SOCIAL STABILITY - SOCIAL INSECURITY: ACCULTURATION DIFFICULTY: Z60.3

## 2022-03-15 ASSESSMENT — ASTHMA QUESTIONNAIRES
QUESTION_3 LAST FOUR WEEKS HOW OFTEN DID YOUR ASTHMA SYMPTOMS (WHEEZING, COUGHING, SHORTNESS OF BREATH, CHEST TIGHTNESS OR PAIN) WAKE YOU UP AT NIGHT OR EARLIER THAN USUAL IN THE MORNING: NOT AT ALL
QUESTION_1 LAST FOUR WEEKS HOW MUCH OF THE TIME DID YOUR ASTHMA KEEP YOU FROM GETTING AS MUCH DONE AT WORK, SCHOOL OR AT HOME: NONE OF THE TIME
ACT_TOTALSCORE: 23
QUESTION_2 LAST FOUR WEEKS HOW OFTEN HAVE YOU HAD SHORTNESS OF BREATH: ONCE OR TWICE A WEEK
QUESTION_4 LAST FOUR WEEKS HOW OFTEN HAVE YOU USED YOUR RESCUE INHALER OR NEBULIZER MEDICATION (SUCH AS ALBUTEROL): ONCE A WEEK OR LESS
QUESTION_5 LAST FOUR WEEKS HOW WOULD YOU RATE YOUR ASTHMA CONTROL: COMPLETELY CONTROLLED
ACT_TOTALSCORE: 23

## 2022-03-15 NOTE — PROGRESS NOTES
CMA contacted the pharmacy and they advice that they have not yet dispensed the medication this month.

## 2022-03-15 NOTE — PROGRESS NOTES
"  Assessment & Plan     Type 2 diabetes mellitus with hyperglycemia, without long-term current use of insulin (H)  Improvement in A1c.  Has not been taking pioglitazone or maybe she has.  Will ask your pharmacy has dispensed.  If she has not, would not continue it.  Good improvement in A1c.  Continue to recommend more exercise for reduction of hyperglycemia.  Check hepatitis B surface antibody to see if she needs vaccines.  - glipiZIDE (GLUCOTROL) 10 MG tablet  Dispense: 180 tablet; Refill: 0  - metFORMIN (GLUCOPHAGE) 1000 MG tablet  Dispense: 180 tablet; Refill: 1  - Hepatitis B Surface Antibody    Mild persistent asthma without complication  Well-controlled.  Continue as needed Symbicort and albuterol.    Language barrier  This visit was conducted with the aid of a professional .      Tobacco Cessation:   reports that she has never smoked. She uses smokeless tobacco.    BMI:   Estimated body mass index is 33.94 kg/m  as calculated from the following:    Height as of 2/1/22: 1.485 m (4' 10.47\").    Weight as of this encounter: 74.8 kg (165 lb).     Return in about 3 months (around 6/15/2022) for Follow up.    Wai Macias MD  [facility]    Melnoie Farrell is a 31 year old who presents for the following health issues  accompanied by her  (phone)    HPI     Diabetes Follow-up      How often are you checking your blood sugar? Not at all    What concerns do you have today about your diabetes? None     Do you have any of these symptoms? (Select all that apply)  No numbness or tingling in feet.  No redness, sores or blisters on feet.  No complaints of excessive thirst.  No reports of blurry vision.  No significant changes to weight.      BP Readings from Last 2 Encounters:   03/15/22 120/84   02/01/22 126/86     Hemoglobin A1C (%)   Date Value   03/15/2022 7.1 (H)   02/01/2022 7.5 (H)     LDL Cholesterol Calculated (mg/dL)   Date Value   11/16/2021 157 (H)   08/19/2020 " 100       Asthma Follow-Up    Was ACT completed today?    Yes    ACT Total Scores 3/15/2022   ACT TOTAL SCORE (Goal Greater than or Equal to 20) 23   In the past 12 months, how many times did you visit the emergency room for your asthma without being admitted to the hospital? 0   In the past 12 months, how many times were you hospitalized overnight because of your asthma? 0          How many days per week do you miss taking your asthma controller medication?  Does not take it often    Please describe any recent triggers for your asthma: Patient is unaware of triggers    Have you had any Emergency Room Visits, Urgent Care Visits, or Hospital Admissions since your last office visit?  No        Objective    /84 (BP Location: Left arm, Patient Position: Sitting, Cuff Size: Adult Regular)   Pulse 97   Resp 16   Wt 74.8 kg (165 lb)   LMP 01/01/2022   BMI 33.94 kg/m    Body mass index is 33.94 kg/m .  Physical Exam   GENERAL: alert, not distressed  CHEST: clear, no rales, rhonchi, or wheezes  CARDIAC: regular without murmur, gallop, or rub    Recent Results (from the past 24 hour(s))   Hemoglobin A1c    Collection Time: 03/15/22  3:48 PM   Result Value Ref Range    Hemoglobin A1C 7.1 (H) 0.0 - 5.6 %

## 2022-03-16 LAB — HBV SURFACE AB SERPLBLD QL IA.RAPID: POSITIVE

## 2022-04-01 ENCOUNTER — TRANSFERRED RECORDS (OUTPATIENT)
Dept: MULTI SPECIALTY CLINIC | Facility: CLINIC | Age: 32
End: 2022-04-01

## 2022-04-01 LAB — RETINOPATHY: NORMAL

## 2022-05-03 ENCOUNTER — NURSE TRIAGE (OUTPATIENT)
Dept: NURSING | Facility: CLINIC | Age: 32
End: 2022-05-03
Payer: COMMERCIAL

## 2022-05-03 ENCOUNTER — PATIENT OUTREACH (OUTPATIENT)
Dept: NURSING | Facility: CLINIC | Age: 32
End: 2022-05-03
Payer: COMMERCIAL

## 2022-05-03 NOTE — TELEPHONE ENCOUNTER
With , pt calling to report:     Abdominal pain, just above the belly button. [place]. Has had this before, has taken medication for gas, states that it usually helps but is not helping now.   Started gradually, every 5 minutes will come and go. Feels like she wants to have a BM but nothing comes out.  Started at midnight has increased since then.   Currently pain is at 5/10, reduced but not completely gone  No other symptoms  Does not believe she is pregnant, has an IUD  Feels tired  No chest pain  Pt states that when she has the pain, she is not able to speak, reports experiencing this during the call.    Per protocol, pt should go to ED now. Pt states that she does not want to go to the ED yet; Pt states that she will go if the pain continues. Pt given care advice and call back information.     Dora Barrera RN  [facility] Nurse Advisor  05/03/22  4:20 PM    COVID 19 Nurse Triage Plan/Patient Instructions    Please be aware that novel coronavirus (COVID-19) may be circulating in the community. If you develop symptoms such as fever, cough, or SOB or if you have concerns about the presence of another infection including coronavirus (COVID-19), please contact your health care provider or visit [email or web address].     Disposition/Instructions    ED Visit recommended. Follow protocol based instructions.     Bring Your Own Device:  Please also bring your smart device(s) (smart phones, tablets, laptops) and their charging cables for your personal use and to communicate with your care team during your visit.    Thank you for taking steps to prevent the spread of this virus.  o Limit your contact with others.  o Wear a simple mask to cover your cough.  o Wash your hands well and often.    Resources    [facility]: About COVID-19: [email or web address]    CDC: What to Do If You're Sick: www.cdc.gov/coronavirus/2019-ncov/about/steps-when-sick.html    CDC: Ending Home  Isolation: www.cdc.gov/coronavirus/2019-ncov/hcp/disposition-in-home-patients.html     CDC: Caring for Someone: www.cdc.gov/coronavirus/2019-ncov/if-you-are-sick/care-for-someone.html     [facility]: Interim Guidance for Hospital Discharge to Home: www.health.[facility].[place]/diseases/coronavirus/hcp/hospdischarge.pdf    [facility] clinical trials (COVID-19 research studies): [email or web address]/umn-clinical-trials     Below are the COVID-19 hotlines at the [place] Department of Health ([facility]). Interpreters are available.   o For health questions: Call 921-803-8027 or 1-581.847.3102 (7 a.m. to 7 p.m.)  o For questions about schools and childcare: Call 303-744-6624 or 1-107.565.8738 (7 a.m. to 7 p.m.)                     Reason for Disposition    Pain is mainly in upper abdomen (if needed ask: 'is it mainly above the belly button?')    SEVERE abdominal pain (e.g., excruciating)    Additional Information    Negative: Passed out (i.e., fainted, collapsed and was not responding)    Negative: Shock suspected (e.g., cold/pale/clammy skin, too weak to stand, low BP, rapid pulse)    Negative: Sounds like a life-threatening emergency to the triager    Negative: Passed out (i.e., fainted, collapsed and was not responding)    Negative: Shock suspected (e.g., cold/pale/clammy skin, too weak to stand, low BP, rapid pulse)    Negative: Visible sweat on face or sweat is dripping down    Negative: Chest pain    Protocols used: ABDOMINAL PAIN - FEMALE-A-OH, ABDOMINAL PAIN - UPPER-A-OH

## 2022-05-03 NOTE — PROGRESS NOTES
"Clinic Care Coordination Contact:     name: Medina Medina  Agency: Language Line    CHW contact this patient today following up on her child Raquel Patricio who is currently enrolled with Care Coordination Service Dept in the [facility].     Jami Prince reported that she is having stomach pain starting last night \"midnight\" and pain level is not going away. Patient confirmed that her pain level increased. CHW suggested Jami to discuss with nurse triage. Pt/Jami agreed.     Coordinate Care:   3:47PM to 4:16PM: CHW with patient and  conference called to nurse triage at [phone]. CHW introduced self, explain reason of called, and \"nurse advisor\" Dora is aware pt with  is in the called. Nurse advisor is informed CHW will remains in the call as needed.    Per CHW observation; nurse advisor advised pt to go to the ER. Pt denied to follow nurse advisor recommendation. Please see nurse advisor notes for further details. CHW thank nurse advisor and  for their time.      Plan:   Message route to update patient PCP.    "

## 2022-07-28 DIAGNOSIS — E11.65 TYPE 2 DIABETES MELLITUS WITH HYPERGLYCEMIA, WITHOUT LONG-TERM CURRENT USE OF INSULIN (H): ICD-10-CM

## 2022-07-29 RX ORDER — PIOGLITAZONEHYDROCHLORIDE 15 MG/1
15 TABLET ORAL DAILY
Qty: 90 TABLET | Refills: 0 | Status: SHIPPED | OUTPATIENT
Start: 2022-07-29 | End: 2022-12-28

## 2022-07-29 NOTE — TELEPHONE ENCOUNTER
"Routing refill request to provider for review/approval because:  Labs not current:  Alt ast    Last Written Prescription Date:  2/1/22  Last Fill Quantity: 90,  # refills: 0   Last office visit provider:  3/15/22     Requested Prescriptions   Pending Prescriptions Disp Refills     pioglitazone (ACTOS) 15 MG tablet 90 tablet 0     Sig: Take 1 tablet (15 mg) by mouth daily       Thiazolidinedione Agents (TZDs)  Failed - 7/28/2022  3:48 PM        Failed - Patient has a normal ALT within the past 12 mos.     Recent Labs   Lab Test 11/16/21  1443   ALT 71*             Failed - Patient has a normal AST within the past 12 mos.      Recent Labs   Lab Test 04/28/21  0946   AST 88*             Passed - Patient has documented A1c within the specified period of time.     If HgbA1C is 8 or greater, it needs to be on file within the past 3 months.  If less than 8, must be on file within the past 6 months.     Recent Labs   Lab Test 03/15/22  1548   A1C 7.1*             Passed - Diagnosis not CHF        Passed - Medication is active on med list        Passed - Patient is age 18 or older        Passed - Patient is not pregnant        Passed - Patient has a normal serum Creatinine in the past 12 months     Recent Labs   Lab Test 02/01/22  1546   CR 0.67       Ok to refill medication if creatinine is low          Passed - Patient has not had a positive pregnancy test within the past 12 mos.        Passed - Recent (6 mo) or future (30 days) visit within the authorizing provider's specialty     Patient had office visit in the last 6 months or has a visit in the next 30 days with authorizing provider or within the authorizing provider's specialty.  See \"Patient Info\" tab in inbasket, or \"Choose Columns\" in Meds & Orders section of the refill encounter.                 Ashley Tabor, RN 07/28/22 7:14 PM  "

## 2022-08-02 ENCOUNTER — OFFICE VISIT (OUTPATIENT)
Dept: FAMILY MEDICINE | Facility: CLINIC | Age: 32
End: 2022-08-02
Payer: COMMERCIAL

## 2022-08-02 VITALS
BODY MASS INDEX: 33.53 KG/M2 | HEART RATE: 103 BPM | RESPIRATION RATE: 16 BRPM | TEMPERATURE: 97.8 F | SYSTOLIC BLOOD PRESSURE: 122 MMHG | WEIGHT: 163 LBS | DIASTOLIC BLOOD PRESSURE: 81 MMHG

## 2022-08-02 DIAGNOSIS — E11.65 TYPE 2 DIABETES MELLITUS WITH HYPERGLYCEMIA, WITHOUT LONG-TERM CURRENT USE OF INSULIN (H): ICD-10-CM

## 2022-08-02 DIAGNOSIS — E11.9 TYPE 2 DIABETES MELLITUS WITHOUT COMPLICATION, WITHOUT LONG-TERM CURRENT USE OF INSULIN (H): Primary | ICD-10-CM

## 2022-08-02 DIAGNOSIS — R74.8 ELEVATED LIVER ENZYMES: ICD-10-CM

## 2022-08-02 LAB
ALBUMIN SERPL BCG-MCNC: 4.5 G/DL (ref 3.5–5.2)
ALP SERPL-CCNC: 86 U/L (ref 35–104)
ALT SERPL W P-5'-P-CCNC: 68 U/L (ref 10–35)
ANION GAP SERPL CALCULATED.3IONS-SCNC: 13 MMOL/L (ref 7–15)
AST SERPL W P-5'-P-CCNC: 62 U/L (ref 10–35)
BILIRUB DIRECT SERPL-MCNC: ABNORMAL MG/DL
BILIRUB SERPL-MCNC: 0.4 MG/DL
BUN SERPL-MCNC: 8.3 MG/DL (ref 6–20)
CALCIUM SERPL-MCNC: 9.8 MG/DL (ref 8.6–10)
CHLORIDE SERPL-SCNC: 103 MMOL/L (ref 98–107)
CREAT SERPL-MCNC: 0.5 MG/DL (ref 0.51–0.95)
DEPRECATED HCO3 PLAS-SCNC: 23 MMOL/L (ref 22–29)
ERYTHROCYTE [DISTWIDTH] IN BLOOD BY AUTOMATED COUNT: 15 % (ref 10–15)
GFR SERPL CREATININE-BSD FRML MDRD: >90 ML/MIN/1.73M2
GLUCOSE SERPL-MCNC: 135 MG/DL (ref 70–99)
HBA1C MFR BLD: 7.7 % (ref 0–5.6)
HCT VFR BLD AUTO: 41.8 % (ref 35–47)
HGB BLD-MCNC: 13.1 G/DL (ref 11.7–15.7)
MCH RBC QN AUTO: 23.6 PG (ref 26.5–33)
MCHC RBC AUTO-ENTMCNC: 31.3 G/DL (ref 31.5–36.5)
MCV RBC AUTO: 76 FL (ref 78–100)
PLATELET # BLD AUTO: 369 10E3/UL (ref 150–450)
POTASSIUM SERPL-SCNC: 3.9 MMOL/L (ref 3.4–5.3)
PROT SERPL-MCNC: 7.9 G/DL (ref 6.4–8.3)
RBC # BLD AUTO: 5.54 10E6/UL (ref 3.8–5.2)
SODIUM SERPL-SCNC: 139 MMOL/L (ref 136–145)
VIT B12 SERPL-MCNC: 1074 PG/ML (ref 232–1245)
WBC # BLD AUTO: 8.5 10E3/UL (ref 4–11)

## 2022-08-02 PROCEDURE — 99214 OFFICE O/P EST MOD 30 MIN: CPT | Mod: 25 | Performed by: FAMILY MEDICINE

## 2022-08-02 PROCEDURE — 82607 VITAMIN B-12: CPT | Performed by: FAMILY MEDICINE

## 2022-08-02 PROCEDURE — 83036 HEMOGLOBIN GLYCOSYLATED A1C: CPT | Performed by: FAMILY MEDICINE

## 2022-08-02 PROCEDURE — 90677 PCV20 VACCINE IM: CPT | Performed by: FAMILY MEDICINE

## 2022-08-02 PROCEDURE — 85027 COMPLETE CBC AUTOMATED: CPT | Performed by: FAMILY MEDICINE

## 2022-08-02 PROCEDURE — 80053 COMPREHEN METABOLIC PANEL: CPT | Performed by: FAMILY MEDICINE

## 2022-08-02 PROCEDURE — 36415 COLL VENOUS BLD VENIPUNCTURE: CPT | Performed by: FAMILY MEDICINE

## 2022-08-02 PROCEDURE — 90471 IMMUNIZATION ADMIN: CPT | Performed by: FAMILY MEDICINE

## 2022-08-02 RX ORDER — GLIPIZIDE 10 MG/1
10 TABLET ORAL
Qty: 180 TABLET | Refills: 1 | Status: SHIPPED | OUTPATIENT
Start: 2022-08-02 | End: 2022-12-28

## 2022-08-02 NOTE — PROGRESS NOTES
"  Assessment & Plan     Type 2 diabetes mellitus without complication, without long-term current use of insulin (H)  Type 2 diabetes mellitus with hyperglycemia, without long-term current use of insulin (H)  Sugars remain higher than goal.  Has been off glipizide but only for a week or so.  Likely should add another agent.  Has reliable birth control.  We will recommend she start SGLT2.  Recheck in 6 to 8 weeks.  - OPTOMETRY REFERRAL  - Hemoglobin A1c  - CBC with platelets  - Basic metabolic panel  (Ca, Cl, CO2, Creat, Gluc, K, Na, BUN)  - Vitamin B12  - glipiZIDE (GLUCOTROL) 10 MG tablet  Dispense: 180 tablet; Refill: 1    Elevated liver enzymes  Stable to improved.  Likely fatty liver and glucose control related.  - Hepatic panel (Albumin, ALT, AST, Bili, Alk Phos, TP)       Nicotine/Tobacco Cessation:  She reports that she has never smoked. She uses smokeless tobacco.  Nicotine/Tobacco Cessation Plan:     BMI:   Estimated body mass index is 33.53 kg/m  as calculated from the following:    Height as of 2/1/22: 1.485 m (4' 10.47\").    Weight as of this encounter: 73.9 kg (163 lb).           Return in about 6 weeks (around 9/13/2022) for Follow up.    Wai Macias MD  [facility]    Melonie Farrell is a 32 year old, presenting for the following health issues:  Follow Up (Diabetes )      History of Present Illness       Diabetes:   She presents for follow up of diabetes.  She is not checking blood glucose. She has no concerns regarding her diabetes at this time.  She is having burning in feet, blurry vision and weight gain. The patient has had a diabetic eye exam in the last 12 months. Eye exam performed on Maybe around April 2022. Location of last eye exam [facility].        She eats 2-3 servings of fruits and vegetables daily.She consumes 1 sweetened beverage(s) daily.She exercises with enough effort to increase her heart rate 9 or less minutes per day.  She " exercises with enough effort to increase her heart rate 3 or less days per week. She is missing 1 dose(s) of medications per week.  She is not taking prescribed medications regularly due to remembering to take.       Diabetes Follow-up      How often are you checking your blood sugar? Not at all    What concerns do you have today about your diabetes? None     Do you have any of these symptoms? (Select all that apply)  No numbness or tingling in feet.  No redness, sores or blisters on feet.  No complaints of excessive thirst.  No reports of blurry vision.  No significant changes to weight.    Have you had a diabetic eye exam in the last 12 months? No        BP Readings from Last 2 Encounters:   08/02/22 122/81   03/15/22 120/84     Hemoglobin A1C (%)   Date Value   08/02/2022 7.7 (H)   03/15/2022 7.1 (H)     LDL Cholesterol Calculated (mg/dL)   Date Value   11/16/2021 157 (H)   08/19/2020 100                   Objective    /81 (BP Location: Right arm, Patient Position: Sitting, Cuff Size: Adult Regular)   Pulse 103   Temp 97.8  F (36.6  C) (Temporal)   Resp 16   Wt 73.9 kg (163 lb)   BMI 33.53 kg/m    Body mass index is 33.53 kg/m .  Physical Exam   GENERAL: alert, not distressed  CHEST: clear, no rales, rhonchi, or wheezes  CARDIAC: regular without murmur, gallop, or rub  ABDOMEN: soft, non tender, non distended, normal bowel sounds    Results for orders placed or performed in visit on 08/02/22   Hemoglobin A1c     Status: Abnormal   Result Value Ref Range    Hemoglobin A1C 7.7 (H) 0.0 - 5.6 %   CBC with platelets     Status: Abnormal   Result Value Ref Range    WBC Count 8.5 4.0 - 11.0 10e3/uL    RBC Count 5.54 (H) 3.80 - 5.20 10e6/uL    Hemoglobin 13.1 11.7 - 15.7 g/dL    Hematocrit 41.8 35.0 - 47.0 %    MCV 76 (L) 78 - 100 fL    MCH 23.6 (L) 26.5 - 33.0 pg    MCHC 31.3 (L) 31.5 - 36.5 g/dL    RDW 15.0 10.0 - 15.0 %    Platelet Count 369 150 - 450 10e3/uL   Basic metabolic panel  (Ca, Cl, CO2, Creat,  Gluc, K, Na, BUN)     Status: Abnormal   Result Value Ref Range    Creatinine 0.50 (L) 0.51 - 0.95 mg/dL    Sodium 139 136 - 145 mmol/L    Potassium 3.9 3.4 - 5.3 mmol/L    Urea Nitrogen 8.3 6.0 - 20.0 mg/dL    Chloride 103 98 - 107 mmol/L    Carbon Dioxide (CO2) 23 22 - 29 mmol/L    Anion Gap 13 7 - 15 mmol/L    Glucose 135 (H) 70 - 99 mg/dL    GFR Estimate >90 >60 mL/min/1.73m2    Calcium 9.8 8.6 - 10.0 mg/dL   Vitamin B12     Status: Normal   Result Value Ref Range    Vitamin B12 1,074 232 - 1,245 pg/mL   Hepatic panel (Albumin, ALT, AST, Bili, Alk Phos, TP)     Status: Abnormal   Result Value Ref Range    Protein Total 7.9 6.4 - 8.3 g/dL    Albumin 4.5 3.5 - 5.2 g/dL    Bilirubin Total 0.4 <=1.2 mg/dL    Alkaline Phosphatase 86 35 - 104 U/L    AST 62 (H) 10 - 35 U/L    ALT 68 (H) 10 - 35 U/L    Bilirubin Direct                     .  ..

## 2022-08-04 ENCOUNTER — TELEPHONE (OUTPATIENT)
Dept: FAMILY MEDICINE | Facility: CLINIC | Age: 32
End: 2022-08-04

## 2022-08-04 NOTE — TELEPHONE ENCOUNTER
----- Message from Wai Macias MD sent at 8/3/2022  5:56 PM CDT -----  Call patient:  Sugars have been too high.  We need to add medicine.  I want her to start a new 1 called Invokana or something similar to that if her insurance does not cover it.  [facility].  Please let me know if the cost is too great.

## 2022-08-16 ENCOUNTER — TRANSFERRED RECORDS (OUTPATIENT)
Dept: HEALTH INFORMATION MANAGEMENT | Facility: CLINIC | Age: 32
End: 2022-08-16

## 2022-08-16 LAB — RETINOPATHY: NEGATIVE

## 2022-12-28 ENCOUNTER — OFFICE VISIT (OUTPATIENT)
Dept: FAMILY MEDICINE | Facility: CLINIC | Age: 32
End: 2022-12-28
Payer: COMMERCIAL

## 2022-12-28 VITALS
BODY MASS INDEX: 33.26 KG/M2 | HEART RATE: 85 BPM | RESPIRATION RATE: 18 BRPM | WEIGHT: 165 LBS | HEIGHT: 59 IN | OXYGEN SATURATION: 98 % | SYSTOLIC BLOOD PRESSURE: 126 MMHG | DIASTOLIC BLOOD PRESSURE: 80 MMHG

## 2022-12-28 DIAGNOSIS — Z30.9 ENCOUNTER FOR CONTRACEPTIVE MANAGEMENT, UNSPECIFIED TYPE: ICD-10-CM

## 2022-12-28 DIAGNOSIS — Z30.432 ENCOUNTER FOR IUD REMOVAL: Primary | ICD-10-CM

## 2022-12-28 DIAGNOSIS — N89.8 VAGINAL DISCHARGE: ICD-10-CM

## 2022-12-28 DIAGNOSIS — E11.65 TYPE 2 DIABETES MELLITUS WITH HYPERGLYCEMIA, WITHOUT LONG-TERM CURRENT USE OF INSULIN (H): ICD-10-CM

## 2022-12-28 LAB
CLUE CELLS: ABNORMAL
TRICHOMONAS, WET PREP: ABNORMAL
WBC'S/HIGH POWER FIELD, WET PREP: ABNORMAL
YEAST, WET PREP: ABNORMAL

## 2022-12-28 PROCEDURE — 87210 SMEAR WET MOUNT SALINE/INK: CPT | Performed by: PHYSICIAN ASSISTANT

## 2022-12-28 PROCEDURE — 99213 OFFICE O/P EST LOW 20 MIN: CPT | Mod: 25 | Performed by: PHYSICIAN ASSISTANT

## 2022-12-28 PROCEDURE — 87591 N.GONORRHOEAE DNA AMP PROB: CPT | Performed by: PHYSICIAN ASSISTANT

## 2022-12-28 PROCEDURE — 87491 CHLMYD TRACH DNA AMP PROBE: CPT | Performed by: PHYSICIAN ASSISTANT

## 2022-12-28 PROCEDURE — 58301 REMOVE INTRAUTERINE DEVICE: CPT | Performed by: PHYSICIAN ASSISTANT

## 2022-12-28 RX ORDER — GLIPIZIDE 10 MG/1
10 TABLET ORAL
Qty: 180 TABLET | Refills: 0 | Status: SHIPPED | OUTPATIENT
Start: 2022-12-28 | End: 2023-07-26

## 2022-12-28 RX ORDER — LEVONORGESTREL/ETHIN.ESTRADIOL 0.1-0.02MG
1 TABLET ORAL DAILY
Qty: 90 TABLET | Refills: 3 | Status: SHIPPED | OUTPATIENT
Start: 2022-12-28 | End: 2023-11-02

## 2022-12-28 RX ORDER — PIOGLITAZONEHYDROCHLORIDE 15 MG/1
15 TABLET ORAL DAILY
Qty: 90 TABLET | Refills: 0 | Status: SHIPPED | OUTPATIENT
Start: 2022-12-28 | End: 2023-07-26

## 2022-12-28 ASSESSMENT — ASTHMA QUESTIONNAIRES
QUESTION_4 LAST FOUR WEEKS HOW OFTEN HAVE YOU USED YOUR RESCUE INHALER OR NEBULIZER MEDICATION (SUCH AS ALBUTEROL): ONCE A WEEK OR LESS
ACT_TOTALSCORE: 17
ACT_TOTALSCORE: 17
QUESTION_3 LAST FOUR WEEKS HOW OFTEN DID YOUR ASTHMA SYMPTOMS (WHEEZING, COUGHING, SHORTNESS OF BREATH, CHEST TIGHTNESS OR PAIN) WAKE YOU UP AT NIGHT OR EARLIER THAN USUAL IN THE MORNING: ONCE A WEEK
QUESTION_5 LAST FOUR WEEKS HOW WOULD YOU RATE YOUR ASTHMA CONTROL: POORLY CONTROLLED
QUESTION_1 LAST FOUR WEEKS HOW MUCH OF THE TIME DID YOUR ASTHMA KEEP YOU FROM GETTING AS MUCH DONE AT WORK, SCHOOL OR AT HOME: A LITTLE OF THE TIME
QUESTION_2 LAST FOUR WEEKS HOW OFTEN HAVE YOU HAD SHORTNESS OF BREATH: ONCE OR TWICE A WEEK

## 2022-12-28 NOTE — PROGRESS NOTES
Subjective:      Jami Prince is a 32 year old female with chief complaint of IUD removal.    1.  IUD removal  IUD was placed on 9/28/2018, [employer].  She is getting fairly regular monthly periods.  She would like IUD removed today.  She says she has been having vaginal discharge and itching.  I discussed with her that it could be possible to treat the itching, and leave IUD in place.  She says this happens often to her, and she is sure she wants IUD removed.  I reviewed her chart and the last time she was seen for vaginal itching was approximately 1 year ago.    2.  Vaginal discharge and itching.  She would like to get checked for these reasons.  She does have fairly well-controlled type 2 diabetes.    3.  Contraception management  Patient would like birth control pills instead of IUD.    Patient Active Problem List   Diagnosis     Type 2 diabetes mellitus without complication, without long-term current use of insulin (H)     Class 1 obesity due to excess calories with serious comorbidity and body mass index (BMI) of 30.0 to 30.9 in adult     Elevated liver enzymes     Mild persistent asthma without complication     Language barrier        Current Outpatient Medications:      albuterol (PROAIR HFA;PROVENTIL HFA;VENTOLIN HFA) 90 mcg/actuation inhaler, [ALBUTEROL (PROAIR HFA;PROVENTIL HFA;VENTOLIN HFA) 90 MCG/ACTUATION INHALER] Inhale 2 puffs every 6 (six) hours as needed for wheezing., Disp: 1 each, Rfl: 2     budesonide-formoteroL (SYMBICORT) 160-4.5 mcg/actuation inhaler, [BUDESONIDE-FORMOTEROL (SYMBICORT) 160-4.5 MCG/ACTUATION INHALER] Inhale 2 puffs 2 (two) times a day., Disp: 1 Inhaler, Rfl: 12     canagliflozin (INVOKANA) 100 MG tablet, Take 1 tablet (100 mg) by mouth every morning (before breakfast), Disp: 90 tablet, Rfl: 0     glipiZIDE (GLUCOTROL) 10 MG tablet, Take 1 tablet (10 mg) by mouth 2 times daily (before meals), Disp: 180 tablet, Rfl: 0     levonorgestrel-ethinyl estradiol (AVIANE) 0.1-20 MG-MCG  "tablet, Take 1 tablet by mouth daily, Disp: 90 tablet, Rfl: 3     metFORMIN (GLUCOPHAGE) 1000 MG tablet, Take 1 tablet (1,000 mg) by mouth 2 times daily (with meals), Disp: 180 tablet, Rfl: 0     pioglitazone (ACTOS) 15 MG tablet, Take 1 tablet (15 mg) by mouth daily, Disp: 90 tablet, Rfl: 0      Objective:     Allergies:  Dust mites    /80 (BP Location: Left arm, Patient Position: Sitting, Cuff Size: Adult Regular)   Pulse 85   Resp 18   Ht 1.49 m (4' 10.66\")   Wt 74.8 kg (165 lb)   SpO2 98%   BMI 33.71 kg/m    Body mass index is 33.71 kg/m .    General: Alert and oriented x 3, in no apparent distress  Genitourinary: External genitalia is normal in appearance, vaginal walls are healthy, cervix is fairly well visualized and normal in appearance, minimal white discharge, IUD strings visualized      Procedure  IUD strings grasped with ring forcep.  Gentle traction applied.  IUD was fairly easily removed today.        Recent Results (from the past 24 hour(s))   Wet preparation    Collection Time: 12/28/22  9:42 AM    Specimen: Vagina; Swab   Result Value Ref Range    Trichomonas Absent Absent    Yeast Absent Absent    Clue Cells Absent Absent    WBCs/high power field 4+ (A) None     Other labs pending.      Assessment and Plan:   1. Encounter for IUD removal  IUD removed today.  Patient tolerated procedure fairly well.  She is aware it is possible to become pregnant as soon as IUD is removed.    2. Vaginal discharge  I will follow-up with today's labs and treat as appropriate.  - Wet preparation  - Chlamydia trachomatis/Neisseria gonorrhoeae by PCR    3. Encounter for contraceptive management, unspecified type  She would like to try birth control pills.  She has tried these in the past without problem.  No history of blood clots, non-smoker.  She does have a history of gestational hypertension, but no hypertension when not pregnant.  We will monitor her blood pressure.  Plan on having her come back to see " PCP in 2 months for diabetic check, recheck BP at that time.  Patient knows to use backup birth control for the first 2 weeks of use.  I reviewed with her she could start these pills today.  She will notify us of any questions or problems.  - levonorgestrel-ethinyl estradiol (AVIANE) 0.1-20 MG-MCG tablet; Take 1 tablet by mouth daily  Dispense: 90 tablet; Refill: 3    4. Type 2 diabetes mellitus with hyperglycemia, without long-term current use of insulin (H)  Reviewed in relation to #2.  Refills of her medicine sent per her request.  She needs to see PCP for diabetes check in February.  We will call her to schedule that.  - canagliflozin (INVOKANA) 100 MG tablet; Take 1 tablet (100 mg) by mouth every morning (before breakfast)  Dispense: 90 tablet; Refill: 0  - glipiZIDE (GLUCOTROL) 10 MG tablet; Take 1 tablet (10 mg) by mouth 2 times daily (before meals)  Dispense: 180 tablet; Refill: 0  - metFORMIN (GLUCOPHAGE) 1000 MG tablet; Take 1 tablet (1,000 mg) by mouth 2 times daily (with meals)  Dispense: 180 tablet; Refill: 0  - pioglitazone (ACTOS) 15 MG tablet; Take 1 tablet (15 mg) by mouth daily  Dispense: 90 tablet; Refill: 0      Of note, she declined flu and COVID booster.      This dictation uses voice recognition software, which may contain typographical errors.

## 2022-12-29 LAB
C TRACH DNA SPEC QL PROBE+SIG AMP: NEGATIVE
N GONORRHOEA DNA SPEC QL NAA+PROBE: NEGATIVE

## 2023-01-01 ENCOUNTER — TELEPHONE (OUTPATIENT)
Dept: FAMILY MEDICINE | Facility: CLINIC | Age: 33
End: 2023-01-01

## 2023-01-02 DIAGNOSIS — B37.31 YEAST INFECTION OF THE VAGINA: Primary | ICD-10-CM

## 2023-01-02 RX ORDER — FLUCONAZOLE 150 MG/1
150 TABLET ORAL ONCE
Qty: 1 TABLET | Refills: 0 | Status: SHIPPED | OUTPATIENT
Start: 2023-01-02 | End: 2023-01-02

## 2023-01-02 NOTE — TELEPHONE ENCOUNTER
Called and helped pt schedule f/u appt with PCP regarding diabetes check.    appt has been schedule on 2/2/23 @220pm.

## 2023-01-03 ENCOUNTER — TELEPHONE (OUTPATIENT)
Dept: FAMILY MEDICINE | Facility: CLINIC | Age: 33
End: 2023-01-03

## 2023-01-03 NOTE — TELEPHONE ENCOUNTER
----- Message from Kezia Sherman PA-C sent at 1/2/2023  9:22 PM CST -----  Her lab tests are normal, but I am going to send her a medicine to treat a possible yeast infection.  It's at her pharmacy.

## 2023-02-02 ENCOUNTER — OFFICE VISIT (OUTPATIENT)
Dept: FAMILY MEDICINE | Facility: CLINIC | Age: 33
End: 2023-02-02
Payer: COMMERCIAL

## 2023-02-02 VITALS
WEIGHT: 162 LBS | DIASTOLIC BLOOD PRESSURE: 84 MMHG | RESPIRATION RATE: 16 BRPM | TEMPERATURE: 97.6 F | BODY MASS INDEX: 32.66 KG/M2 | SYSTOLIC BLOOD PRESSURE: 125 MMHG | OXYGEN SATURATION: 98 % | HEART RATE: 93 BPM | HEIGHT: 59 IN

## 2023-02-02 DIAGNOSIS — E11.65 TYPE 2 DIABETES MELLITUS WITH HYPERGLYCEMIA, WITHOUT LONG-TERM CURRENT USE OF INSULIN (H): Primary | ICD-10-CM

## 2023-02-02 DIAGNOSIS — J45.30 MILD PERSISTENT ASTHMA WITHOUT COMPLICATION: ICD-10-CM

## 2023-02-02 LAB
ANION GAP SERPL CALCULATED.3IONS-SCNC: 12 MMOL/L (ref 7–15)
BUN SERPL-MCNC: 7.3 MG/DL (ref 6–20)
CALCIUM SERPL-MCNC: 9.3 MG/DL (ref 8.6–10)
CHLORIDE SERPL-SCNC: 98 MMOL/L (ref 98–107)
CHOLEST SERPL-MCNC: 195 MG/DL
CREAT SERPL-MCNC: 0.57 MG/DL (ref 0.51–0.95)
CREAT UR-MCNC: 22.5 MG/DL
DEPRECATED HCO3 PLAS-SCNC: 24 MMOL/L (ref 22–29)
GFR SERPL CREATININE-BSD FRML MDRD: >90 ML/MIN/1.73M2
GLUCOSE SERPL-MCNC: 276 MG/DL (ref 70–99)
HBA1C MFR BLD: 7 % (ref 0–5.6)
HDLC SERPL-MCNC: 46 MG/DL
LDLC SERPL CALC-MCNC: 88 MG/DL
MICROALBUMIN UR-MCNC: 12.6 MG/L
MICROALBUMIN/CREAT UR: 56 MG/G CR (ref 0–25)
NONHDLC SERPL-MCNC: 149 MG/DL
POTASSIUM SERPL-SCNC: 3.8 MMOL/L (ref 3.4–5.3)
SODIUM SERPL-SCNC: 134 MMOL/L (ref 136–145)
TRIGL SERPL-MCNC: 304 MG/DL
TSH SERPL DL<=0.005 MIU/L-ACNC: 0.75 UIU/ML (ref 0.3–4.2)
VIT B12 SERPL-MCNC: 801 PG/ML (ref 232–1245)

## 2023-02-02 PROCEDURE — 99214 OFFICE O/P EST MOD 30 MIN: CPT | Performed by: FAMILY MEDICINE

## 2023-02-02 PROCEDURE — 82570 ASSAY OF URINE CREATININE: CPT | Performed by: FAMILY MEDICINE

## 2023-02-02 PROCEDURE — 84443 ASSAY THYROID STIM HORMONE: CPT | Performed by: FAMILY MEDICINE

## 2023-02-02 PROCEDURE — 80061 LIPID PANEL: CPT | Performed by: FAMILY MEDICINE

## 2023-02-02 PROCEDURE — 36415 COLL VENOUS BLD VENIPUNCTURE: CPT | Performed by: FAMILY MEDICINE

## 2023-02-02 PROCEDURE — 83036 HEMOGLOBIN GLYCOSYLATED A1C: CPT | Performed by: FAMILY MEDICINE

## 2023-02-02 PROCEDURE — 82043 UR ALBUMIN QUANTITATIVE: CPT | Performed by: FAMILY MEDICINE

## 2023-02-02 PROCEDURE — 82607 VITAMIN B-12: CPT | Performed by: FAMILY MEDICINE

## 2023-02-02 PROCEDURE — 80048 BASIC METABOLIC PNL TOTAL CA: CPT | Performed by: FAMILY MEDICINE

## 2023-02-02 ASSESSMENT — ASTHMA QUESTIONNAIRES
QUESTION_1 LAST FOUR WEEKS HOW MUCH OF THE TIME DID YOUR ASTHMA KEEP YOU FROM GETTING AS MUCH DONE AT WORK, SCHOOL OR AT HOME: NONE OF THE TIME
ACT_TOTALSCORE: 24
QUESTION_2 LAST FOUR WEEKS HOW OFTEN HAVE YOU HAD SHORTNESS OF BREATH: NOT AT ALL
QUESTION_5 LAST FOUR WEEKS HOW WOULD YOU RATE YOUR ASTHMA CONTROL: WELL CONTROLLED
QUESTION_3 LAST FOUR WEEKS HOW OFTEN DID YOUR ASTHMA SYMPTOMS (WHEEZING, COUGHING, SHORTNESS OF BREATH, CHEST TIGHTNESS OR PAIN) WAKE YOU UP AT NIGHT OR EARLIER THAN USUAL IN THE MORNING: NOT AT ALL
QUESTION_4 LAST FOUR WEEKS HOW OFTEN HAVE YOU USED YOUR RESCUE INHALER OR NEBULIZER MEDICATION (SUCH AS ALBUTEROL): NOT AT ALL
ACT_TOTALSCORE: 24

## 2023-02-02 ASSESSMENT — PATIENT HEALTH QUESTIONNAIRE - PHQ9
10. IF YOU CHECKED OFF ANY PROBLEMS, HOW DIFFICULT HAVE THESE PROBLEMS MADE IT FOR YOU TO DO YOUR WORK, TAKE CARE OF THINGS AT HOME, OR GET ALONG WITH OTHER PEOPLE: NOT DIFFICULT AT ALL
SUM OF ALL RESPONSES TO PHQ QUESTIONS 1-9: 3
SUM OF ALL RESPONSES TO PHQ QUESTIONS 1-9: 3

## 2023-02-02 NOTE — PROGRESS NOTES
"  Assessment & Plan     Type 2 diabetes mellitus with hyperglycemia, without long-term current use of insulin (H)  Continue efforts at diet and exercise.  If A1c is elevated, would recommend increasing dose of Invokana.  She notes no side effects with that now.  - Hemoglobin A1c  - Lipid panel reflex to direct LDL Non-fasting  - Basic metabolic panel  (Ca, Cl, CO2, Creat, Gluc, K, Na, BUN)  - TSH with free T4 reflex  - Vitamin B12    Mild persistent asthma without complication  Well-controlled.  We will continue current therapy.  She is okay to use the controller medicine on an as-needed basis.       BMI:   Estimated body mass index is 33.1 kg/m  as calculated from the following:    Height as of this encounter: 1.49 m (4' 10.66\").    Weight as of this encounter: 73.5 kg (162 lb).         Return in about 6 months (around 8/2/2023) for Follow up.    Wai Macias MD  [facility]    Melonie Farrell is a 32 year old accompanied by her self, presenting for the following health issues:  Diabetes      History of Present Illness       Diabetes:   She presents for follow up of diabetes.  She is not checking blood glucose. She has no concerns regarding her diabetes at this time.  She is not experiencing numbness or burning in feet, excessive thirst, blurry vision, weight changes or redness, sores or blisters on feet.         She eats 2-3 servings of fruits and vegetables daily.She consumes 1 sweetened beverage(s) daily.She exercises with enough effort to increase her heart rate 10 to 19 minutes per day.  She exercises with enough effort to increase her heart rate 4 days per week.   She is taking medications regularly.    Today's PHQ-9         PHQ-9 Total Score: 3    PHQ-9 Q9 Thoughts of better off dead/self-harm past 2 weeks :   Not at all    How difficult have these problems made it for you to do your work, take care of things at home, or get along with other people: Not difficult at all   " "    Diabetes Follow-up      How often are you checking your blood sugar? Not at all    What concerns do you have today about your diabetes? None     Do you have any of these symptoms? (Select all that apply)  No numbness or tingling in feet.  No redness, sores or blisters on feet.  No complaints of excessive thirst.  No reports of blurry vision.  No significant changes to weight.      BP Readings from Last 2 Encounters:   02/02/23 125/84   12/28/22 126/80     Hemoglobin A1C (%)   Date Value   02/02/2023 7.0 (H)   08/02/2022 7.7 (H)     LDL Cholesterol Calculated (mg/dL)   Date Value   11/16/2021 157 (H)   08/19/2020 100               Asthma Follow-Up    Was ACT completed today?    Yes    ACT Total Scores 2/2/2023   ACT TOTAL SCORE (Goal Greater than or Equal to 20) 24   In the past 12 months, how many times did you visit the emergency room for your asthma without being admitted to the hospital? 0   In the past 12 months, how many times were you hospitalized overnight because of your asthma? 0          How many days per week do you miss taking your asthma controller medication?  Taking as needed    Please describe any recent triggers for your asthma: Patient is unaware of triggers    Have you had any Emergency Room Visits, Urgent Care Visits, or Hospital Admissions since your last office visit?  No    Continues oral contraceptive pills.  No problems with those.      Objective    /84 (BP Location: Right arm, Patient Position: Sitting, Cuff Size: Adult Regular)   Pulse 93   Temp 97.6  F (36.4  C) (Temporal)   Resp 16   Ht 1.49 m (4' 10.66\")   Wt 73.5 kg (162 lb)   LMP 01/27/2023   SpO2 98%   BMI 33.10 kg/m    Body mass index is 33.1 kg/m .  Physical Exam   GENERAL: alert, not distressed  CHEST: clear, no rales, rhonchi, or wheezes  CARDIAC: regular without murmur, gallop, or rub    Results for orders placed or performed in visit on 02/02/23 (from the past 24 hour(s))   Hemoglobin A1c   Result Value Ref " Range    Hemoglobin A1C 7.0 (H) 0.0 - 5.6 %

## 2023-02-07 ENCOUNTER — TELEPHONE (OUTPATIENT)
Dept: FAMILY MEDICINE | Facility: CLINIC | Age: 33
End: 2023-02-07
Payer: COMMERCIAL

## 2023-02-07 NOTE — TELEPHONE ENCOUNTER
----- Message from Wai Macias MD sent at 2/4/2023  8:48 AM CST -----  Call:  Lets increase the invokana dose to 300 mg  I sent a new prescription

## 2023-02-07 NOTE — TELEPHONE ENCOUNTER
Left message x 1. Please review message thread below and advise the patient as indicated. Please schedule if necessary or indicated in message thread.  Use  line to contact patient :Jaylene

## 2023-07-26 ENCOUNTER — OFFICE VISIT (OUTPATIENT)
Dept: FAMILY MEDICINE | Facility: CLINIC | Age: 33
End: 2023-07-26
Payer: COMMERCIAL

## 2023-07-26 VITALS
HEIGHT: 58 IN | OXYGEN SATURATION: 98 % | DIASTOLIC BLOOD PRESSURE: 84 MMHG | SYSTOLIC BLOOD PRESSURE: 132 MMHG | BODY MASS INDEX: 33.17 KG/M2 | RESPIRATION RATE: 20 BRPM | HEART RATE: 90 BPM | TEMPERATURE: 97.9 F | WEIGHT: 158 LBS

## 2023-07-26 DIAGNOSIS — Z00.00 ROUTINE GENERAL MEDICAL EXAMINATION AT A HEALTH CARE FACILITY: Primary | ICD-10-CM

## 2023-07-26 DIAGNOSIS — B96.89 BACTERIAL VAGINOSIS: ICD-10-CM

## 2023-07-26 DIAGNOSIS — E11.65 TYPE 2 DIABETES MELLITUS WITH HYPERGLYCEMIA, WITHOUT LONG-TERM CURRENT USE OF INSULIN (H): ICD-10-CM

## 2023-07-26 DIAGNOSIS — N76.0 BACTERIAL VAGINOSIS: ICD-10-CM

## 2023-07-26 DIAGNOSIS — J45.40 MODERATE PERSISTENT ASTHMA WITHOUT COMPLICATION: ICD-10-CM

## 2023-07-26 DIAGNOSIS — B37.31 YEAST INFECTION OF THE VAGINA: ICD-10-CM

## 2023-07-26 LAB
CLUE CELLS: PRESENT
HBA1C MFR BLD: 8.1 % (ref 0–5.6)
TRICHOMONAS, WET PREP: ABNORMAL
WBC'S/HIGH POWER FIELD, WET PREP: ABNORMAL
YEAST, WET PREP: PRESENT

## 2023-07-26 PROCEDURE — 36415 COLL VENOUS BLD VENIPUNCTURE: CPT | Performed by: FAMILY MEDICINE

## 2023-07-26 PROCEDURE — 99214 OFFICE O/P EST MOD 30 MIN: CPT | Mod: 25 | Performed by: FAMILY MEDICINE

## 2023-07-26 PROCEDURE — 99395 PREV VISIT EST AGE 18-39: CPT | Performed by: FAMILY MEDICINE

## 2023-07-26 PROCEDURE — 84460 ALANINE AMINO (ALT) (SGPT): CPT | Performed by: FAMILY MEDICINE

## 2023-07-26 PROCEDURE — 84450 TRANSFERASE (AST) (SGOT): CPT | Performed by: FAMILY MEDICINE

## 2023-07-26 PROCEDURE — 87210 SMEAR WET MOUNT SALINE/INK: CPT | Performed by: FAMILY MEDICINE

## 2023-07-26 PROCEDURE — 83036 HEMOGLOBIN GLYCOSYLATED A1C: CPT | Performed by: FAMILY MEDICINE

## 2023-07-26 PROCEDURE — 99207 PR FOOT EXAM NO CHARGE: CPT | Mod: 25 | Performed by: FAMILY MEDICINE

## 2023-07-26 PROCEDURE — 80048 BASIC METABOLIC PNL TOTAL CA: CPT | Performed by: FAMILY MEDICINE

## 2023-07-26 RX ORDER — GLIPIZIDE 10 MG/1
10 TABLET ORAL
Qty: 180 TABLET | Refills: 0 | Status: SHIPPED | OUTPATIENT
Start: 2023-07-26 | End: 2023-11-01

## 2023-07-26 RX ORDER — LANCETS 33 GAUGE
1 EACH MISCELLANEOUS DAILY
Qty: 200 EACH | Refills: 4 | Status: SHIPPED | OUTPATIENT
Start: 2023-07-26

## 2023-07-26 RX ORDER — PIOGLITAZONEHYDROCHLORIDE 15 MG/1
15 TABLET ORAL DAILY
Qty: 90 TABLET | Refills: 0 | Status: SHIPPED | OUTPATIENT
Start: 2023-07-26 | End: 2023-11-02

## 2023-07-26 RX ORDER — MICONAZOLE NITRATE 2 %
1 CREAM WITH APPLICATOR VAGINAL AT BEDTIME
Qty: 0.7 G | Refills: 0 | Status: SHIPPED | OUTPATIENT
Start: 2023-07-26 | End: 2023-08-02

## 2023-07-26 RX ORDER — BLOOD-GLUCOSE METER
1 EACH MISCELLANEOUS PRN
Qty: 1 KIT | Refills: 0 | Status: SHIPPED | OUTPATIENT
Start: 2023-07-26 | End: 2023-07-27

## 2023-07-26 RX ORDER — BUDESONIDE AND FORMOTEROL FUMARATE DIHYDRATE 160; 4.5 UG/1; UG/1
2 AEROSOL RESPIRATORY (INHALATION) 2 TIMES DAILY
Qty: 10.2 G | Refills: 4 | Status: SHIPPED | OUTPATIENT
Start: 2023-07-26 | End: 2023-07-26

## 2023-07-26 RX ORDER — BLOOD SUGAR DIAGNOSTIC
STRIP MISCELLANEOUS
Qty: 200 STRIP | Refills: 4 | Status: SHIPPED | OUTPATIENT
Start: 2023-07-26 | End: 2023-07-27

## 2023-07-26 RX ORDER — BUDESONIDE AND FORMOTEROL FUMARATE DIHYDRATE 160; 4.5 UG/1; UG/1
AEROSOL RESPIRATORY (INHALATION)
Qty: 20.4 G | Refills: 11 | Status: SHIPPED | OUTPATIENT
Start: 2023-07-26 | End: 2024-08-19

## 2023-07-26 RX ORDER — BUDESONIDE AND FORMOTEROL FUMARATE DIHYDRATE 160; 4.5 UG/1; UG/1
AEROSOL RESPIRATORY (INHALATION)
Qty: 20.4 G | Refills: 11 | Status: SHIPPED | OUTPATIENT
Start: 2023-07-26 | End: 2023-07-26

## 2023-07-26 ASSESSMENT — ENCOUNTER SYMPTOMS
HEARTBURN: 0
ARTHRALGIAS: 0
PALPITATIONS: 0
FREQUENCY: 0
JOINT SWELLING: 0
PARESTHESIAS: 0
FEVER: 0
SHORTNESS OF BREATH: 1
DYSURIA: 0
HEMATURIA: 0
WEAKNESS: 0
CONSTIPATION: 0
BREAST MASS: 0
CHILLS: 0
DIZZINESS: 1
MYALGIAS: 1
DIARRHEA: 0
HEMATOCHEZIA: 0
COUGH: 0
HEADACHES: 1
SORE THROAT: 0
ABDOMINAL PAIN: 0
NERVOUS/ANXIOUS: 0
EYE PAIN: 0
NAUSEA: 0

## 2023-07-26 ASSESSMENT — ASTHMA QUESTIONNAIRES
QUESTION_3 LAST FOUR WEEKS HOW OFTEN DID YOUR ASTHMA SYMPTOMS (WHEEZING, COUGHING, SHORTNESS OF BREATH, CHEST TIGHTNESS OR PAIN) WAKE YOU UP AT NIGHT OR EARLIER THAN USUAL IN THE MORNING: ONCE OR TWICE
ACT_TOTALSCORE: 13
QUESTION_2 LAST FOUR WEEKS HOW OFTEN HAVE YOU HAD SHORTNESS OF BREATH: ONCE A DAY
QUESTION_1 LAST FOUR WEEKS HOW MUCH OF THE TIME DID YOUR ASTHMA KEEP YOU FROM GETTING AS MUCH DONE AT WORK, SCHOOL OR AT HOME: MOST OF THE TIME
ACT_TOTALSCORE: 13
QUESTION_5 LAST FOUR WEEKS HOW WOULD YOU RATE YOUR ASTHMA CONTROL: SOMEWHAT CONTROLLED
QUESTION_4 LAST FOUR WEEKS HOW OFTEN HAVE YOU USED YOUR RESCUE INHALER OR NEBULIZER MEDICATION (SUCH AS ALBUTEROL): ONE OR TWO TIMES PER DAY

## 2023-07-26 NOTE — PROGRESS NOTES
SUBJECTIVE:   CC: Jami is an 33 year old who presents for preventive health visit.       7/26/2023    12:57 PM   Additional Questions   Roomed by William WOLFE   Accompanied by daughter, Tanner       Healthy Habits:     Getting at least 3 servings of Calcium per day:  Yes    Bi-annual eye exam:  NO    Dental care twice a year:  NO    Sleep apnea or symptoms of sleep apnea:  None    Diet:  Diabetic    Frequency of exercise:  2-3 days/week    Duration of exercise:  15-30 minutes    Taking medications regularly:  Yes    Medication side effects:  None    Additional concerns today:  No      Today's PHQ-2 Score:       7/26/2023    12:42 PM   PHQ-2 ( 1999 [employer])   Q1: Little interest or pleasure in doing things 0   Q2: Feeling down, depressed or hopeless 0   PHQ-2 Score 0   Q1: Little interest or pleasure in doing things Not at all   Q2: Feeling down, depressed or hopeless Not at all   PHQ-2 Score 0     Diabetes Follow-up    How often are you checking your blood sugar? Not at all  What concerns do you have today about your diabetes? None and Other: yeast infection   Do you have any of these symptoms? (Select all that apply)  No numbness or tingling in feet.  No redness, sores or blisters on feet.  No complaints of excessive thirst.  No reports of blurry vision.  No significant changes to weight.  Have you had a diabetic eye exam in the last 12 months? No        BP Readings from Last 2 Encounters:   07/26/23 132/84   02/02/23 125/84     Hemoglobin A1C (%)   Date Value   02/02/2023 7.0 (H)   08/02/2022 7.7 (H)     LDL Cholesterol Calculated (mg/dL)   Date Value   02/02/2023 88   11/16/2021 157 (H)             Asthma Follow-Up    Was ACT completed today?  Yes        7/26/2023     1:04 PM   ACT Total Scores   ACT TOTAL SCORE (Goal Greater than or Equal to 20) 13   In the past 12 months, how many times did you visit the emergency room for your asthma without being admitted to the hospital? 0   In the past 12 months, how many  times were you hospitalized overnight because of your asthma? 0       How many days per week do you miss taking your asthma controller medication?  0  Please describe any recent triggers for your asthma:  unknown  Have you had any Emergency Room Visits, Urgent Care Visits, or Hospital Admissions since your last office visit?  No  Have you ever done Advance Care Planning? (For example, a Health Directive, POLST, or a discussion with a medical provider or your loved ones about your wishes): No, advance care planning information given to patient to review.  Patient declined advance care planning discussion at this time.    Social History     Tobacco Use    Smoking status: Never    Smokeless tobacco: Never    Tobacco comments:     BETTLE NUT   Substance Use Topics    Alcohol use: No             2023    12:42 PM   Alcohol Use   Prescreen: >3 drinks/day or >7 drinks/week? No       BP Readings from Last 3 Encounters:   23 132/84   23 125/84   22 126/80    Wt Readings from Last 3 Encounters:   23 71.7 kg (158 lb)   23 73.5 kg (162 lb)   22 74.8 kg (165 lb)                  Patient Active Problem List   Diagnosis    Type 2 diabetes mellitus without complication, without long-term current use of insulin (H)    Class 1 obesity due to excess calories with serious comorbidity and body mass index (BMI) of 30.0 to 30.9 in adult    Elevated liver enzymes    Mild persistent asthma without complication    Language barrier     Past Surgical History:   Procedure Laterality Date    C/SECTION, LOW TRANSVERSE  10/2015     SECTION N/A 2018    Procedure:  SECTION, REPEAT;  Surgeon: Ronald Ang MD;  Location: [facility] OR;  Service:     REPEAT  SECTION  2018    LAKESHIA / KURTIS ANG /  [facility]        Social History     Tobacco Use    Smoking status: Never    Smokeless tobacco: Never    Tobacco comments:     BETTLE NUT   Substance Use Topics    Alcohol use: No      Family History   Problem Relation Age of Onset    Heart Defect Sister     No Known Problems Brother          Current Outpatient Medications   Medication Sig Dispense Refill    albuterol (PROAIR HFA;PROVENTIL HFA;VENTOLIN HFA) 90 mcg/actuation inhaler [ALBUTEROL (PROAIR HFA;PROVENTIL HFA;VENTOLIN HFA) 90 MCG/ACTUATION INHALER] Inhale 2 puffs every 6 (six) hours as needed for wheezing. 1 each 2    budesonide-formoterol (SYMBICORT) 160-4.5 MCG/ACT Inhaler Inhale 2 puffs into the lungs 2 times daily 10.2 g 4    canagliflozin (INVOKANA) 300 MG tablet Take 1 tablet (300 mg) by mouth every morning (before breakfast) 90 tablet 0    glipiZIDE (GLUCOTROL) 10 MG tablet Take 1 tablet (10 mg) by mouth 2 times daily (before meals) 180 tablet 0    levonorgestrel-ethinyl estradiol (AVIANE) 0.1-20 MG-MCG tablet Take 1 tablet by mouth daily 90 tablet 3    metFORMIN (GLUCOPHAGE) 1000 MG tablet Take 1 tablet (1,000 mg) by mouth 2 times daily (with meals) 180 tablet 0    pioglitazone (ACTOS) 15 MG tablet Take 1 tablet (15 mg) by mouth daily 90 tablet 0     Allergies   Allergen Reactions    Dust Mites      Recent Labs   Lab Test 23  1452 22  1519 03/15/22  1548 22  1546 21  1443 21  0949 21  0946 21  1555 20  1606   A1C 7.0* 7.7* 7.1* 7.5* 7.7*   < > 8.9* 8.2* 8.0*   LDL 88  --   --   --  157*  --   --   --  100   HDL 46*  --   --   --  52  --   --   --  39*   TRIG 304*  --   --   --  158*  --   --   --  372*   ALT  --  68*  --   --  71*  --  104* 123* 147*   CR 0.57 0.50*  --  0.67 0.67   < > 0.71 0.74 0.75   GFRESTIMATED >90 >90  --  >90 >90   < > >60 >60 >60   GFRESTBLACK  --   --   --   --   --   --  >60 >60 >60   POTASSIUM 3.8 3.9  --  3.9 4.0   < > 4.2 4.3 3.8   TSH 0.75  --   --  0.91  --   --   --   --   --     < > = values in this interval not displayed.        Breast Cancer Screenin/26/2023    12:42 PM   Breast CA Risk Assessment (FHS-7)   Do you have a family history  of breast, colon, or ovarian cancer? No / Unknown     Pertinent mammograms are reviewed under the imaging tab.    History of abnormal Pap smear: NO - age 30-65 PAP every 5 years with negative HPV co-testing recommended      Latest Ref Rng & Units 2021     3:00 PM 10/13/2017     4:44 PM 2015     2:17 PM   PAP / HPV   PAP Negative for squamous intraepithelial lesion or malignancy. Negative for squamous intraepithelial lesion or malignancy  Electronically signed by Esther Barksdale CT (ASCP) on 2021 at 11:30 AM    Negative for squamous intraepithelial lesion or malignancy  Electronically signed by Caty Keith CT (ASCP) on 10/18/2017 at  3:42 PM    Negative for squamous intraepithelial lesion or malignancy  Electronically signed by Esther Barksdale CT (ASCP) on 2015 at 11:15 AM      HPV 16 DNA NEG Negative      HPV 18 DNA NEG Negative      Other HR HPV NEG Negative        Reviewed and updated as needed this visit by clinical staff   Tobacco  Allergies  Meds              Reviewed and updated as needed this visit by Provider                 Past Medical History:   Diagnosis Date    GDM, class A2 2018    Gestational diabetes 2018    Hypertension     preeclampsia vs. Gest HTN    Mild persistent asthma without complication 2021    Premenstrual tension syndromes     S/P  section 2018    Urinary tract infection       Past Surgical History:   Procedure Laterality Date    C/SECTION, LOW TRANSVERSE  10/2015     SECTION N/A 2018    Procedure:  SECTION, REPEAT;  Surgeon: Ronald Ang MD;  [facility];  Service:     REPEAT  SECTION  2018    REPEAT / KURTIS ANG / [facility]      OB History    Para Term  AB Living   2 2 2 0 0 2   SAB IAB Ectopic Multiple Live Births   0 0 0 0 2      # Outcome Date GA Lbr Carlos/2nd Weight Sex Delivery Anes PTL Lv   2 Term 18 38w0d  3.91 kg (8 lb 9.9 oz) F CS-LTranv  "Spinal  CADENCE      Name: MARYFEMALEARTI      Apgar1: 8  Apgar5: 9   1 Term 10/25/15 38w2d  3.572 kg (7 lb 14 oz) F CS-LTranv   CADENCE      Complications: Failure to Progress in First Stage      Name: Raquel Patricio      Apgar1: 8  Apgar5: 8       Review of Systems   Constitutional:  Negative for chills and fever.   HENT:  Negative for congestion, ear pain, hearing loss and sore throat.    Eyes:  Negative for pain and visual disturbance.   Respiratory:  Positive for shortness of breath. Negative for cough.    Cardiovascular:  Negative for chest pain, palpitations and peripheral edema.   Gastrointestinal:  Negative for abdominal pain, constipation, diarrhea, heartburn, hematochezia and nausea.   Breasts:  Negative for tenderness, breast mass and discharge.   Genitourinary:  Negative for dysuria, frequency, genital sores, hematuria, pelvic pain, urgency, vaginal bleeding and vaginal discharge.   Musculoskeletal:  Positive for myalgias. Negative for arthralgias and joint swelling.   Skin:  Negative for rash.   Neurological:  Positive for dizziness and headaches. Negative for weakness and paresthesias.   Psychiatric/Behavioral:  Negative for mood changes. The patient is not nervous/anxious.       OBJECTIVE:   /84 (BP Location: Left arm, Patient Position: Sitting, Cuff Size: Adult Regular)   Pulse 90   Temp 97.9  F (36.6  C) (Temporal)   Resp 20   Ht 1.474 m (4' 10.03\")   Wt 71.7 kg (158 lb)   SpO2 98%   BMI 32.99 kg/m    Physical Exam  Gen:   Alert, not distressed  Head:   Normocephalic, without obvious abnormality, atraumatic  Eyes:   PERRL, conjunctiva/corneas clear, EOM's intact  Ears:   Normal tympanic membranes and external ear canals  Nose:    Mucosa normal, no drainage or sinus tenderness  Throat:    No erythema or exudates  Neck:    No adenopathy no nodules in thyroid, normal ROM  Lungs:    Clear to auscultation bilaterally, respirations unlabored  Chest wall:  No tenderness or deformity  Heart:    "  Regular, normal S1 and S2, no murmur, gallop or rub  Abdomen:  Soft, non-tender, normal bowel sounds, no masses, no organomegaly  Back:    Symmetric, no curvature, ROM normal, no CVA tenderness  Genitalia:    Normal vulva, vaginal mucosa, slight white discharge, normal cervix  Extremities:   Extremities normal, atraumatic, no cyanosis or edema  Skin:     Skin color, texture, turgor normal, no rashes or lesions  Lymph nodes:   Cervical and supraclavicular nodes normal  Neurologic:   CNII-XII intact.   DTRs normal and symmetric.  Symmetric strength and sensation.      Diagnostic Test Results:  Labs reviewed in Epic  Results for orders placed or performed in visit on 07/26/23   HEMOGLOBIN A1C     Status: Abnormal   Result Value Ref Range    Hemoglobin A1C 8.1 (H) 0.0 - 5.6 %   Basic metabolic panel  (Ca, Cl, CO2, Creat, Gluc, K, Na, BUN)     Status: Abnormal   Result Value Ref Range    Sodium 136 136 - 145 mmol/L    Potassium 3.7 3.4 - 5.3 mmol/L    Chloride 101 98 - 107 mmol/L    Carbon Dioxide (CO2) 22 22 - 29 mmol/L    Anion Gap 13 7 - 15 mmol/L    Urea Nitrogen 8.1 6.0 - 20.0 mg/dL    Creatinine 0.58 0.51 - 0.95 mg/dL    Calcium 9.7 8.6 - 10.0 mg/dL    Glucose 180 (H) 70 - 99 mg/dL    GFR Estimate >90 >60 mL/min/1.73m2   AST     Status: Abnormal   Result Value Ref Range    AST 67 (H) 0 - 45 U/L   ALT     Status: Abnormal   Result Value Ref Range    ALT 71 (H) 0 - 50 U/L   Wet prep - lab collect     Status: Abnormal    Specimen: Vagina; Swab   Result Value Ref Range    Trichomonas Absent Absent    Yeast Present (A) Absent    Clue Cells Present (A) Absent    WBCs/high power field 1+ (A) None       ASSESSMENT/PLAN:   Jami was seen today for physical, shortness of breath and diabetes check.    Diagnoses and all orders for this visit:    Routine general medical examination at a health care facility    Type 2 diabetes mellitus with hyperglycemia, without long-term current use of insulin (H)  Not controlled to goal but  had been off some of her medicines.  We will reinstate and monitor closely.  -     pioglitazone (ACTOS) 15 MG tablet; Take 1 tablet (15 mg) by mouth daily  -     metFORMIN (GLUCOPHAGE) 1000 MG tablet; Take 1 tablet (1,000 mg) by mouth 2 times daily (with meals)  -     glipiZIDE (GLUCOTROL) 10 MG tablet; Take 1 tablet (10 mg) by mouth 2 times daily (before meals)  -     canagliflozin (INVOKANA) 300 MG tablet; Take 1 tablet (300 mg) by mouth every morning (before breakfast)  -     AST  -     ALT    Moderate persistent asthma without complication  Again, has been off medicines the control is not ideal.  -     budesonide-formoterol (SYMBICORT) 160-4.5 MCG/ACT Inhaler; Inhale 2 puffs once daily plus 1-2 puffs as needed. May use up to 12 puffs per day.    Yeast infection of the vagina  Treat with miconazole.  Blood sugar control will help as well.  -     miconazole (MICONAZOLE 7) 2 % cream; Place 1 applicator vaginally At Bedtime for 7 days  -     Wet prep - lab collect; Future  -     Wet prep - lab collect    Other orders  -     REVIEW OF HEALTH MAINTENANCE PROTOCOL ORDERS  -     HEMOGLOBIN A1C; Future  -     Adult Eye  Referral; Future  -     FOOT EXAM  -     Basic metabolic panel  (Ca, Cl, CO2, Creat, Gluc, K, Na, BUN); Future  -     PRIMARY CARE FOLLOW-UP SCHEDULING; Future  -     Discontinue: Blood Glucose Monitoring Suppl (ONETOUCH VERIO FLEX SYSTEM) w/Device KIT; 1 each as needed  -     Discontinue: blood glucose (ONETOUCH VERIO IQ) test strip; Use to test blood sugar 1 times daily or as directed.  -     OneTouch Delica Lancets 33G MISC; 1 each daily  -     HEMOGLOBIN A1C  -     Basic metabolic panel  (Ca, Cl, CO2, Creat, Gluc, K, Na, BUN)        Patient has been advised of split billing requirements and indicates understanding: Yes      COUNSELING:  Reviewed preventive health counseling, as reflected in patient instructions       Regular exercise       Healthy diet/nutrition       Contraception        "Family planning      BMI:   Estimated body mass index is 32.99 kg/m  as calculated from the following:    Height as of this encounter: 1.474 m (4' 10.03\").    Weight as of this encounter: 71.7 kg (158 lb).   Weight management plan: Discussed healthy diet and exercise guidelines      She reports that she has never smoked. She has never used smokeless tobacco.          Wai Macias MD  [facility]  Prior to immunization administration, verified patients identity using patient s name and date of birth. Please see Immunization Activity for additional information.     Screening Questionnaire for Adult Immunization    Are you sick today?   No   Do you have allergies to medications, food, a vaccine component or latex?   No   Have you ever had a serious reaction after receiving a vaccination?   No   Do you have a long-term health problem with heart, lung, kidney, or metabolic disease (e.g., diabetes), asthma, a blood disorder, no spleen, complement component deficiency, a cochlear implant, or a spinal fluid leak?  Are you on long-term aspirin therapy?   Yes   Do you have cancer, leukemia, HIV/AIDS, or any other immune system problem?   No   Do you have a parent, brother, or sister with an immune system problem?   No   In the past 3 months, have you taken medications that affect  your immune system, such as prednisone, other steroids, or anticancer drugs; drugs for the treatment of rheumatoid arthritis, Crohn s disease, or psoriasis; or have you had radiation treatments?   No   Have you had a seizure, or a brain or other nervous system problem?   No   During the past year, have you received a transfusion of blood or blood    products, or been given immune (gamma) globulin or antiviral drug?   No   For women: Are you pregnant or is there a chance you could become       pregnant during the next month?   No   Have you received any vaccinations in the past 4 weeks?   Yes     Immunization questionnaire " was positive for at least one answer.  Notified Dr. Macias.      Patient instructed to remain in clinic for 15 minutes afterwards, and to report any adverse reactions.     Screening performed by William Weber MA on 7/26/2023 at 1:08 PM.

## 2023-07-27 ENCOUNTER — TELEPHONE (OUTPATIENT)
Dept: FAMILY MEDICINE | Facility: CLINIC | Age: 33
End: 2023-07-27
Payer: COMMERCIAL

## 2023-07-27 DIAGNOSIS — E11.65 TYPE 2 DIABETES MELLITUS WITH HYPERGLYCEMIA, WITHOUT LONG-TERM CURRENT USE OF INSULIN (H): Primary | ICD-10-CM

## 2023-07-27 LAB
ALT SERPL W P-5'-P-CCNC: 71 U/L (ref 0–50)
ANION GAP SERPL CALCULATED.3IONS-SCNC: 13 MMOL/L (ref 7–15)
AST SERPL W P-5'-P-CCNC: 67 U/L (ref 0–45)
BUN SERPL-MCNC: 8.1 MG/DL (ref 6–20)
CALCIUM SERPL-MCNC: 9.7 MG/DL (ref 8.6–10)
CHLORIDE SERPL-SCNC: 101 MMOL/L (ref 98–107)
CREAT SERPL-MCNC: 0.58 MG/DL (ref 0.51–0.95)
DEPRECATED HCO3 PLAS-SCNC: 22 MMOL/L (ref 22–29)
GFR SERPL CREATININE-BSD FRML MDRD: >90 ML/MIN/1.73M2
GLUCOSE SERPL-MCNC: 180 MG/DL (ref 70–99)
POTASSIUM SERPL-SCNC: 3.7 MMOL/L (ref 3.4–5.3)
SODIUM SERPL-SCNC: 136 MMOL/L (ref 136–145)

## 2023-07-27 RX ORDER — LANCETS
100 EACH MISCELLANEOUS 2 TIMES DAILY
Qty: 100 EACH | Refills: 4 | Status: SHIPPED | OUTPATIENT
Start: 2023-07-27

## 2023-07-27 RX ORDER — BLOOD-GLUCOSE METER
1 EACH MISCELLANEOUS ONCE
Qty: 1 KIT | Refills: 0 | Status: SHIPPED | OUTPATIENT
Start: 2023-07-27 | End: 2023-07-27

## 2023-07-27 NOTE — TELEPHONE ENCOUNTER
Central Prior Authorization Team   Phone: [phone]        PRIOR AUTHORIZATION DENIED    Medication: ONETOUCH VERIO VI STRP  Insurance Company: [employer] - Phone [phone] Fax [phone]  Denial Date: 7/27/2023  Denial Rational: [facility] non-formulary criteria requires documentation that a member has tried two formulary alternatives, for example: Contour AND Accu-Chek, before proceeding to the requested test strips.        Appeal Information:         Patient Notified: No  Please note: Providers/Clinics are to notify the patients of the denial outcomes and steps going forward.

## 2023-07-28 ENCOUNTER — TRANSFERRED RECORDS (OUTPATIENT)
Dept: HEALTH INFORMATION MANAGEMENT | Facility: CLINIC | Age: 33
End: 2023-07-28
Payer: COMMERCIAL

## 2023-07-28 ENCOUNTER — TELEPHONE (OUTPATIENT)
Dept: FAMILY MEDICINE | Facility: CLINIC | Age: 33
End: 2023-07-28
Payer: COMMERCIAL

## 2023-07-28 RX ORDER — METRONIDAZOLE 500 MG/1
500 TABLET ORAL 2 TIMES DAILY
Qty: 14 TABLET | Refills: 0 | Status: SHIPPED | OUTPATIENT
Start: 2023-07-28 | End: 2023-08-04

## 2023-07-28 NOTE — TELEPHONE ENCOUNTER
----- Message from Wai Macias MD sent at 7/28/2023  3:27 PM CDT -----  Call:  Diabetes was a little bit out of control.  Likely due to missing some of your medicines.  You should have 3 different medicines for diabetes now.  Take them off for 6 weeks and then let us recheck your labs.    Asthma was a bit out of control as well.  Restart that Symbicort inhaler and follow-up in 6 weeks.    The vaginal swabs had 1 more bacteria that was overgrown.  You have had this before as well.  We should treat this with a different antibiotic.  I will send a prescription for 1 week of metronidazole for that.    Please schedule appointment in 6 weeks.

## 2023-07-31 NOTE — TELEPHONE ENCOUNTER
Contacted patient with message below from Dr Macias.  Went over medications with patient using an   Appointment scheduled on 9/5/23 with PCP, Dr Macias.  No further action needed.    Veronica Lazaro RN  [facility]

## 2023-08-17 ENCOUNTER — TRANSFERRED RECORDS (OUTPATIENT)
Dept: HEALTH INFORMATION MANAGEMENT | Facility: CLINIC | Age: 33
End: 2023-08-17
Payer: COMMERCIAL

## 2023-08-17 LAB — RETINOPATHY: NEGATIVE

## 2023-08-19 NOTE — PROGRESS NOTES
COVID-19 PCR test completed. Patient handout For Patients Who Have Been Tested for Covid-19 (Coronavirus) was given to the patient, which includes test result notification process.  
Coronavirus (COVID-19) Notification   Patient has tested Positive test for COVID-19 virus   COVID-19 Positive followup letter sent   See telephone encounter
<--- Click to Launch ICDx for PreOp, PostOp and Procedure

## 2023-10-25 ENCOUNTER — TELEPHONE (OUTPATIENT)
Dept: FAMILY MEDICINE | Facility: CLINIC | Age: 33
End: 2023-10-25

## 2023-10-25 ENCOUNTER — OFFICE VISIT (OUTPATIENT)
Dept: FAMILY MEDICINE | Facility: CLINIC | Age: 33
End: 2023-10-25
Payer: COMMERCIAL

## 2023-10-25 VITALS
HEIGHT: 58 IN | RESPIRATION RATE: 18 BRPM | BODY MASS INDEX: 33.58 KG/M2 | TEMPERATURE: 96.9 F | WEIGHT: 160 LBS | OXYGEN SATURATION: 98 % | SYSTOLIC BLOOD PRESSURE: 126 MMHG | HEART RATE: 85 BPM | DIASTOLIC BLOOD PRESSURE: 85 MMHG

## 2023-10-25 DIAGNOSIS — E11.9 TYPE 2 DIABETES MELLITUS WITHOUT COMPLICATION, WITHOUT LONG-TERM CURRENT USE OF INSULIN (H): Primary | ICD-10-CM

## 2023-10-25 DIAGNOSIS — E11.69 TYPE 2 DIABETES MELLITUS WITH OTHER SPECIFIED COMPLICATION, WITHOUT LONG-TERM CURRENT USE OF INSULIN (H): ICD-10-CM

## 2023-10-25 DIAGNOSIS — J45.30 MILD PERSISTENT ASTHMA WITHOUT COMPLICATION: ICD-10-CM

## 2023-10-25 LAB
ANION GAP SERPL CALCULATED.3IONS-SCNC: 15 MMOL/L (ref 7–15)
BUN SERPL-MCNC: 12 MG/DL (ref 6–20)
CALCIUM SERPL-MCNC: 10.1 MG/DL (ref 8.6–10)
CHLORIDE SERPL-SCNC: 101 MMOL/L (ref 98–107)
CREAT SERPL-MCNC: 0.59 MG/DL (ref 0.51–0.95)
DEPRECATED HCO3 PLAS-SCNC: 20 MMOL/L (ref 22–29)
EGFRCR SERPLBLD CKD-EPI 2021: >90 ML/MIN/1.73M2
GLUCOSE SERPL-MCNC: 115 MG/DL (ref 70–99)
HBA1C MFR BLD: 6.4 % (ref 0–5.6)
POTASSIUM SERPL-SCNC: 4.1 MMOL/L (ref 3.4–5.3)
SODIUM SERPL-SCNC: 136 MMOL/L (ref 135–145)

## 2023-10-25 PROCEDURE — 90471 IMMUNIZATION ADMIN: CPT | Performed by: FAMILY MEDICINE

## 2023-10-25 PROCEDURE — 80048 BASIC METABOLIC PNL TOTAL CA: CPT | Performed by: FAMILY MEDICINE

## 2023-10-25 PROCEDURE — 83036 HEMOGLOBIN GLYCOSYLATED A1C: CPT | Performed by: FAMILY MEDICINE

## 2023-10-25 PROCEDURE — 36415 COLL VENOUS BLD VENIPUNCTURE: CPT | Performed by: FAMILY MEDICINE

## 2023-10-25 PROCEDURE — 90686 IIV4 VACC NO PRSV 0.5 ML IM: CPT | Performed by: FAMILY MEDICINE

## 2023-10-25 PROCEDURE — 99214 OFFICE O/P EST MOD 30 MIN: CPT | Mod: 25 | Performed by: FAMILY MEDICINE

## 2023-10-25 RX ORDER — BUDESONIDE AND FORMOTEROL FUMARATE DIHYDRATE 160; 4.5 UG/1; UG/1
AEROSOL RESPIRATORY (INHALATION)
Qty: 20.4 G | Refills: 11 | Status: SHIPPED | OUTPATIENT
Start: 2023-10-25 | End: 2024-08-19

## 2023-10-25 ASSESSMENT — ASTHMA QUESTIONNAIRES: ACT_TOTALSCORE: 13

## 2023-10-25 NOTE — TELEPHONE ENCOUNTER
Called pt's and relay test results.   ----- Message from Wai Macias MD sent at 10/25/2023 12:50 PM CDT -----  Call:  Great improvement in A1c.  Keep taking the same medicines.  Do not run out.  Will refill them.

## 2023-10-25 NOTE — PROGRESS NOTES
Assessment & Plan     Type 2 diabetes mellitus without complication, without long-term current use of insulin (H)  Type 2 diabetes mellitus with other specified complication, without long-term current use of insulin (H)  Better control.  Has been on medicines consistently.  Advised to continue those.  No significant concerns right now.  - Basic metabolic panel  (Ca, Cl, CO2, Creat, Gluc, K, Na, BUN)  - Hemoglobin A1c    Mild persistent asthma without complication  Control should be better.  Discussed WOJCIECH guidelines and recommended increasing use of Symbicort rather than adding more albuterol.  Formal PFTs recommended.  - budesonide-formoterol (SYMBICORT) 160-4.5 MCG/ACT Inhaler  Dispense: 20.4 g; Refill: 11  - General PFT Lab (Please always keep checked)  - Pulmonary Function Test         Wai Macias MD  [facility]    Melonie Farrell is a 33 year old, presenting for the following health issues:  Diabetes      10/25/2023    10:13 AM   Additional Questions   Roomed by Arpit   Accompanied by daughter       History of Present Illness       Diabetes:   She presents for follow up of diabetes.  She is checking home blood glucose two times daily.   She checks blood glucose before and after meals.  Blood glucose is sometimes over 200 and sometimes under 70. She is aware of hypoglycemia symptoms including none.   She is concerned about blood sugar frequently over 200 and low blood sugar, several less than 70 in the past few weeks.   She is having numbness in feet.            She eats 0-1 servings of fruits and vegetables daily.She consumes 1 sweetened beverage(s) daily.She exercises with enough effort to increase her heart rate 20 to 29 minutes per day.  She exercises with enough effort to increase her heart rate 3 or less days per week.   She is taking medications regularly.         Lab Results   Component Value Date    A1C 8.1 07/26/2023    A1C 7.0 02/02/2023    A1C 7.7 08/02/2022    A1C  "7.1 03/15/2022    A1C 7.5 02/01/2022 2/2/2023     2:32 PM 7/26/2023     1:04 PM 10/25/2023     9:56 AM   ACT Total Scores   ACT TOTAL SCORE (Goal Greater than or Equal to 20) 24 13 13   In the past 12 months, how many times did you visit the emergency room for your asthma without being admitted to the hospital? 0 0 0   In the past 12 months, how many times were you hospitalized overnight because of your asthma? 0 0 0           Objective    /85 (BP Location: Left arm, Patient Position: Sitting, Cuff Size: Adult Large)   Pulse 85   Temp 96.9  F (36.1  C) (Temporal)   Resp 18   Ht 1.48 m (4' 10.27\")   Wt 72.6 kg (160 lb)   LMP 09/27/2023 (Approximate)   SpO2 98%   BMI 33.13 kg/m    Body mass index is 33.13 kg/m .  Physical Exam   GENERAL: alert, not distressed  CHEST: clear, no rales, rhonchi, or wheezes  CARDIAC: regular without murmur, gallop, or rub  ABDOMEN: soft, non tender, non distended, normal bowel sounds      Results for orders placed or performed in visit on 10/25/23   Basic metabolic panel  (Ca, Cl, CO2, Creat, Gluc, K, Na, BUN)     Status: Abnormal   Result Value Ref Range    Sodium 136 135 - 145 mmol/L    Potassium 4.1 3.4 - 5.3 mmol/L    Chloride 101 98 - 107 mmol/L    Carbon Dioxide (CO2) 20 (L) 22 - 29 mmol/L    Anion Gap 15 7 - 15 mmol/L    Urea Nitrogen 12.0 6.0 - 20.0 mg/dL    Creatinine 0.59 0.51 - 0.95 mg/dL    GFR Estimate >90 >60 mL/min/1.73m2    Calcium 10.1 (H) 8.6 - 10.0 mg/dL    Glucose 115 (H) 70 - 99 mg/dL   Hemoglobin A1c     Status: Abnormal   Result Value Ref Range    Hemoglobin A1C 6.4 (H) 0.0 - 5.6 %       Prior to immunization administration, verified patients identity using patient s name and date of birth. Please see Immunization Activity for additional information.     Screening Questionnaire for Adult Immunization    Are you sick today?   No   Do you have allergies to medications, food, a vaccine component or latex?   No   Have you ever had a serious " reaction after receiving a vaccination?   No   Do you have a long-term health problem with heart, lung, kidney, or metabolic disease (e.g., diabetes), asthma, a blood disorder, no spleen, complement component deficiency, a cochlear implant, or a spinal fluid leak?  Are you on long-term aspirin therapy?   No   Do you have cancer, leukemia, HIV/AIDS, or any other immune system problem?   No   Do you have a parent, brother, or sister with an immune system problem?   No   In the past 3 months, have you taken medications that affect  your immune system, such as prednisone, other steroids, or anticancer drugs; drugs for the treatment of rheumatoid arthritis, Crohn s disease, or psoriasis; or have you had radiation treatments?   No   Have you had a seizure, or a brain or other nervous system problem?   No   During the past year, have you received a transfusion of blood or blood    products, or been given immune (gamma) globulin or antiviral drug?   No   For women: Are you pregnant or is there a chance you could become       pregnant during the next month?   No   Have you received any vaccinations in the past 4 weeks?   No     Immunization questionnaire answers were all negative.      Patient instructed to remain in clinic for 15 minutes afterwards, and to report any adverse reactions.     Screening performed by Arpit Holland MA on 10/25/2023 at 10:15 AM.

## 2023-11-01 DIAGNOSIS — E11.65 TYPE 2 DIABETES MELLITUS WITH HYPERGLYCEMIA, WITHOUT LONG-TERM CURRENT USE OF INSULIN (H): ICD-10-CM

## 2023-11-01 RX ORDER — GLIPIZIDE 10 MG/1
10 TABLET ORAL
Qty: 180 TABLET | Refills: 2 | Status: SHIPPED | OUTPATIENT
Start: 2023-11-01 | End: 2024-08-06

## 2023-11-01 RX ORDER — CANAGLIFLOZIN 300 MG/1
300 TABLET, FILM COATED ORAL
Qty: 90 TABLET | Refills: 2 | Status: SHIPPED | OUTPATIENT
Start: 2023-11-01 | End: 2024-08-06

## 2023-11-02 DIAGNOSIS — Z30.9 ENCOUNTER FOR CONTRACEPTIVE MANAGEMENT, UNSPECIFIED TYPE: ICD-10-CM

## 2023-11-02 RX ORDER — PIOGLITAZONEHYDROCHLORIDE 15 MG/1
15 TABLET ORAL DAILY
Qty: 30 TABLET | Refills: 0 | Status: SHIPPED | OUTPATIENT
Start: 2023-11-02 | End: 2023-11-28

## 2023-11-02 NOTE — TELEPHONE ENCOUNTER
Future Appointments 11/2/2023 - 4/30/2024        Date Visit Type Length Department Provider     11/3/2023 11:00 AM LAB 15 min [facility]    Location Instructions:     We are located at [address] [place] [address]. We offer free parking in the lot on [address] across the street from the clinic. Please check in at the  through the main entrance.              11/15/2023  1:00 PM FULL PFT W-WO MIP/MEP/MVV 60 min [facility] PULMONOLOGY [facility] PFT RM 1     11/15/2023  1:00 PM FULL PFT W-WO MIP/MEP/MVV 90 min UR  SERVICE  NEEDED

## 2023-11-02 NOTE — TELEPHONE ENCOUNTER
Medication Question or Refill        What medication are you calling about (include dose and sig)?: levonorgestrel-ethinyl estradiol (AVIANE) 0.1-20 MG-MCG tablet     Preferred Pharmacy:   [address] [address]  Phone: [phone] Fax: [phone]      Controlled Substance Agreement on file:   CSA -- Patient Level:    CSA: None found at the patient level.       Who prescribed the medication?: PCP    Do you need a refill? Yes    When did you use the medication last? N/A    Patient offered an appointment? No    Do you have any questions or concerns?  No      Okay to leave a detailed message?: Yes at Home number on file [phone] (home)

## 2023-11-02 NOTE — TELEPHONE ENCOUNTER
Patient reports she will be starting her final pack of her OCP next week which will take her to the first week of December.     She has already attempted to contact her pharmacy and has been told that she has no refills remaining after this.     She confirms that she has not lost any packs and takes all 4 weeks of medication each pack (does not skip the placebo pills).     Refill pended with start date of 12/1/23.

## 2023-11-03 ENCOUNTER — LAB (OUTPATIENT)
Dept: LAB | Facility: CLINIC | Age: 33
End: 2023-11-03
Payer: COMMERCIAL

## 2023-11-03 DIAGNOSIS — E11.65 TYPE 2 DIABETES MELLITUS WITH HYPERGLYCEMIA, WITHOUT LONG-TERM CURRENT USE OF INSULIN (H): ICD-10-CM

## 2023-11-03 PROCEDURE — 82977 ASSAY OF GGT: CPT

## 2023-11-03 PROCEDURE — 84450 TRANSFERASE (AST) (SGOT): CPT

## 2023-11-03 PROCEDURE — 84460 ALANINE AMINO (ALT) (SGPT): CPT

## 2023-11-03 PROCEDURE — 36415 COLL VENOUS BLD VENIPUNCTURE: CPT

## 2023-11-04 LAB
ALT SERPL W P-5'-P-CCNC: 33 U/L (ref 0–50)
AST SERPL W P-5'-P-CCNC: 29 U/L (ref 0–45)
GGT SERPL-CCNC: 39 U/L (ref 5–36)

## 2023-11-06 RX ORDER — LEVONORGESTREL/ETHIN.ESTRADIOL 0.1-0.02MG
1 TABLET ORAL DAILY
Qty: 90 TABLET | Refills: 3 | Status: SHIPPED | OUTPATIENT
Start: 2023-12-01 | End: 2024-08-19

## 2023-11-07 ENCOUNTER — TELEPHONE (OUTPATIENT)
Dept: FAMILY MEDICINE | Facility: CLINIC | Age: 33
End: 2023-11-07
Payer: COMMERCIAL

## 2023-11-07 NOTE — TELEPHONE ENCOUNTER
----- Message from Wai Macias MD sent at 11/4/2023 10:46 AM CDT -----  Call:  Liver tests look ok.

## 2023-11-09 NOTE — TELEPHONE ENCOUNTER
Left message x 2. Please review message thread below and advise the patient as indicated. Please schedule if necessary or indicated in message thread. Use  line to contact patient :way

## 2023-11-15 ENCOUNTER — OFFICE VISIT (OUTPATIENT)
Dept: PULMONOLOGY | Facility: CLINIC | Age: 33
End: 2023-11-15
Attending: FAMILY MEDICINE
Payer: COMMERCIAL

## 2023-11-15 DIAGNOSIS — J45.30 MILD PERSISTENT ASTHMA WITHOUT COMPLICATION: ICD-10-CM

## 2023-11-15 PROCEDURE — 94729 DIFFUSING CAPACITY: CPT | Performed by: INTERNAL MEDICINE

## 2023-11-15 PROCEDURE — 94375 RESPIRATORY FLOW VOLUME LOOP: CPT | Performed by: INTERNAL MEDICINE

## 2023-11-15 PROCEDURE — 94726 PLETHYSMOGRAPHY LUNG VOLUMES: CPT | Performed by: INTERNAL MEDICINE

## 2023-11-16 LAB
DLCOCOR-%PRED-PRE: 157 %
DLCOCOR-PRE: 29.35 ML/MIN/MMHG
DLCOUNC-%PRED-PRE: 150 %
DLCOUNC-PRE: 28.11 ML/MIN/MMHG
DLCOUNC-PRED: 18.64 ML/MIN/MMHG
ERV-%PRED-PRE: 58 %
ERV-PRE: 0.59 L
ERV-PRED: 1.01 L
EXPTIME-PRE: 5.69 SEC
FEF2575-%PRED-PRE: 72 %
FEF2575-PRE: 2.03 L/SEC
FEF2575-PRED: 2.79 L/SEC
FEFMAX-%PRED-PRE: 68 %
FEFMAX-PRE: 4.19 L/SEC
FEFMAX-PRED: 6.14 L/SEC
FEV1-%PRED-PRE: 80 %
FEV1-PRE: 1.91 L
FEV1FEV6-PRE: 82 %
FEV1FEV6-PRED: 85 %
FEV1FVC-PRE: 82 %
FEV1FVC-PRED: 85 %
FEV1SVC-PRE: 84 %
FEV1SVC-PRED: 80 %
FIFMAX-PRE: 1.4 L/SEC
FRCPLETH-%PRED-PRE: 98 %
FRCPLETH-PRE: 2.31 L
FRCPLETH-PRED: 2.34 L
FVC-%PRED-PRE: 84 %
FVC-PRE: 2.33 L
FVC-PRED: 2.77 L
IC-%PRED-PRE: 83 %
IC-PRE: 1.68 L
IC-PRED: 2.01 L
RVPLETH-%PRED-PRE: 138 %
RVPLETH-PRE: 1.72 L
RVPLETH-PRED: 1.24 L
TLCPLETH-%PRED-PRE: 101 %
TLCPLETH-PRE: 4 L
TLCPLETH-PRED: 3.95 L
VA-%PRED-PRE: 85 %
VA-PRE: 3.38 L
VC-%PRED-PRE: 76 %
VC-PRE: 2.27 L
VC-PRED: 2.96 L

## 2023-11-28 DIAGNOSIS — E11.65 TYPE 2 DIABETES MELLITUS WITH HYPERGLYCEMIA, WITHOUT LONG-TERM CURRENT USE OF INSULIN (H): ICD-10-CM

## 2023-11-28 RX ORDER — PIOGLITAZONEHYDROCHLORIDE 15 MG/1
15 TABLET ORAL DAILY
Qty: 30 TABLET | Refills: 0 | Status: SHIPPED | OUTPATIENT
Start: 2023-11-28 | End: 2024-01-03

## 2024-01-03 DIAGNOSIS — E11.65 TYPE 2 DIABETES MELLITUS WITH HYPERGLYCEMIA, WITHOUT LONG-TERM CURRENT USE OF INSULIN (H): ICD-10-CM

## 2024-01-03 RX ORDER — PIOGLITAZONEHYDROCHLORIDE 15 MG/1
15 TABLET ORAL DAILY
Qty: 90 TABLET | Refills: 1 | Status: SHIPPED | OUTPATIENT
Start: 2024-01-03 | End: 2024-07-08

## 2024-06-26 ENCOUNTER — PATIENT OUTREACH (OUTPATIENT)
Dept: CARE COORDINATION | Facility: CLINIC | Age: 34
End: 2024-06-26
Payer: COMMERCIAL

## 2024-07-08 DIAGNOSIS — E11.65 TYPE 2 DIABETES MELLITUS WITH HYPERGLYCEMIA, WITHOUT LONG-TERM CURRENT USE OF INSULIN (H): ICD-10-CM

## 2024-07-08 RX ORDER — PIOGLITAZONEHYDROCHLORIDE 15 MG/1
15 TABLET ORAL DAILY
Qty: 30 TABLET | Refills: 0 | Status: SHIPPED | OUTPATIENT
Start: 2024-07-08 | End: 2024-08-06

## 2024-07-10 ENCOUNTER — PATIENT OUTREACH (OUTPATIENT)
Dept: CARE COORDINATION | Facility: CLINIC | Age: 34
End: 2024-07-10
Payer: COMMERCIAL

## 2024-08-05 DIAGNOSIS — E11.65 TYPE 2 DIABETES MELLITUS WITH HYPERGLYCEMIA, WITHOUT LONG-TERM CURRENT USE OF INSULIN (H): ICD-10-CM

## 2024-08-06 RX ORDER — PIOGLITAZONEHYDROCHLORIDE 15 MG/1
15 TABLET ORAL DAILY
Qty: 30 TABLET | Refills: 0 | Status: SHIPPED | OUTPATIENT
Start: 2024-08-06

## 2024-08-06 RX ORDER — CANAGLIFLOZIN 300 MG/1
300 TABLET, FILM COATED ORAL
Qty: 30 TABLET | Refills: 0 | Status: SHIPPED | OUTPATIENT
Start: 2024-08-06 | End: 2024-08-19

## 2024-08-06 RX ORDER — GLIPIZIDE 10 MG/1
10 TABLET ORAL
Qty: 60 TABLET | Refills: 0 | Status: SHIPPED | OUTPATIENT
Start: 2024-08-06 | End: 2024-08-19

## 2024-08-19 ENCOUNTER — TELEPHONE (OUTPATIENT)
Dept: FAMILY MEDICINE | Facility: CLINIC | Age: 34
End: 2024-08-19

## 2024-08-19 ENCOUNTER — OFFICE VISIT (OUTPATIENT)
Dept: FAMILY MEDICINE | Facility: CLINIC | Age: 34
End: 2024-08-19
Payer: COMMERCIAL

## 2024-08-19 VITALS
DIASTOLIC BLOOD PRESSURE: 98 MMHG | TEMPERATURE: 97.9 F | WEIGHT: 160 LBS | OXYGEN SATURATION: 98 % | HEIGHT: 58 IN | RESPIRATION RATE: 16 BRPM | SYSTOLIC BLOOD PRESSURE: 144 MMHG | HEART RATE: 99 BPM | BODY MASS INDEX: 33.58 KG/M2

## 2024-08-19 DIAGNOSIS — Z00.00 ROUTINE GENERAL MEDICAL EXAMINATION AT A HEALTH CARE FACILITY: Primary | ICD-10-CM

## 2024-08-19 DIAGNOSIS — E11.69 TYPE 2 DIABETES MELLITUS WITH OTHER SPECIFIED COMPLICATION, WITHOUT LONG-TERM CURRENT USE OF INSULIN (H): ICD-10-CM

## 2024-08-19 DIAGNOSIS — E11.69 TYPE 2 DIABETES MELLITUS WITH OTHER SPECIFIED COMPLICATION, WITHOUT LONG-TERM CURRENT USE OF INSULIN (H): Primary | ICD-10-CM

## 2024-08-19 DIAGNOSIS — J45.50 SEVERE PERSISTENT ASTHMA WITHOUT COMPLICATION (H): ICD-10-CM

## 2024-08-19 DIAGNOSIS — Z30.9 ENCOUNTER FOR CONTRACEPTIVE MANAGEMENT, UNSPECIFIED TYPE: ICD-10-CM

## 2024-08-19 LAB
ALBUMIN SERPL BCG-MCNC: 4.4 G/DL (ref 3.5–5.2)
ALP SERPL-CCNC: 70 U/L (ref 40–150)
ALT SERPL W P-5'-P-CCNC: 19 U/L (ref 0–50)
AST SERPL W P-5'-P-CCNC: 23 U/L (ref 0–45)
BILIRUB DIRECT SERPL-MCNC: <0.2 MG/DL (ref 0–0.3)
BILIRUB SERPL-MCNC: 0.3 MG/DL
CHOLEST SERPL-MCNC: 243 MG/DL
CREAT UR-MCNC: 21.1 MG/DL
FASTING STATUS PATIENT QL REPORTED: YES
HBA1C MFR BLD: 6.4 % (ref 0–5.6)
HDLC SERPL-MCNC: 64 MG/DL
LDLC SERPL CALC-MCNC: 147 MG/DL
MICROALBUMIN UR-MCNC: 31.3 MG/L
MICROALBUMIN/CREAT UR: 148.34 MG/G CR (ref 0–25)
NONHDLC SERPL-MCNC: 179 MG/DL
PROT SERPL-MCNC: 8 G/DL (ref 6.4–8.3)
TRIGL SERPL-MCNC: 161 MG/DL
TSH SERPL DL<=0.005 MIU/L-ACNC: 2.49 UIU/ML (ref 0.3–4.2)
VIT B12 SERPL-MCNC: 562 PG/ML (ref 232–1245)

## 2024-08-19 PROCEDURE — 84443 ASSAY THYROID STIM HORMONE: CPT | Performed by: FAMILY MEDICINE

## 2024-08-19 PROCEDURE — 99207 PR FOOT EXAM NO CHARGE: CPT | Performed by: FAMILY MEDICINE

## 2024-08-19 PROCEDURE — 80061 LIPID PANEL: CPT | Performed by: FAMILY MEDICINE

## 2024-08-19 PROCEDURE — 82570 ASSAY OF URINE CREATININE: CPT | Performed by: FAMILY MEDICINE

## 2024-08-19 PROCEDURE — 99395 PREV VISIT EST AGE 18-39: CPT | Performed by: FAMILY MEDICINE

## 2024-08-19 PROCEDURE — 82043 UR ALBUMIN QUANTITATIVE: CPT | Performed by: FAMILY MEDICINE

## 2024-08-19 PROCEDURE — 99214 OFFICE O/P EST MOD 30 MIN: CPT | Mod: 25 | Performed by: FAMILY MEDICINE

## 2024-08-19 PROCEDURE — T1013 SIGN LANG/ORAL INTERPRETER: HCPCS | Mod: U4

## 2024-08-19 PROCEDURE — 82607 VITAMIN B-12: CPT | Performed by: FAMILY MEDICINE

## 2024-08-19 PROCEDURE — 83036 HEMOGLOBIN GLYCOSYLATED A1C: CPT | Performed by: FAMILY MEDICINE

## 2024-08-19 PROCEDURE — 36415 COLL VENOUS BLD VENIPUNCTURE: CPT | Performed by: FAMILY MEDICINE

## 2024-08-19 PROCEDURE — 80076 HEPATIC FUNCTION PANEL: CPT | Performed by: FAMILY MEDICINE

## 2024-08-19 RX ORDER — BUDESONIDE AND FORMOTEROL FUMARATE DIHYDRATE 160; 4.5 UG/1; UG/1
AEROSOL RESPIRATORY (INHALATION)
Qty: 20.4 G | Refills: 11 | Status: SHIPPED | OUTPATIENT
Start: 2024-08-19

## 2024-08-19 RX ORDER — LEVONORGESTREL/ETHIN.ESTRADIOL 0.1-0.02MG
1 TABLET ORAL DAILY
Qty: 90 TABLET | Refills: 3 | Status: SHIPPED | OUTPATIENT
Start: 2024-08-19 | End: 2024-08-19

## 2024-08-19 RX ORDER — LEVONORGESTREL/ETHIN.ESTRADIOL 0.1-0.02MG
1 TABLET ORAL DAILY
Qty: 90 TABLET | Refills: 0 | Status: SHIPPED | OUTPATIENT
Start: 2024-08-19

## 2024-08-19 RX ORDER — GLIPIZIDE 10 MG/1
10 TABLET ORAL
Qty: 60 TABLET | Refills: 0 | Status: SHIPPED | OUTPATIENT
Start: 2024-08-19

## 2024-08-19 SDOH — HEALTH STABILITY: PHYSICAL HEALTH: ON AVERAGE, HOW MANY MINUTES DO YOU ENGAGE IN EXERCISE AT THIS LEVEL?: 30 MIN

## 2024-08-19 SDOH — HEALTH STABILITY: PHYSICAL HEALTH: ON AVERAGE, HOW MANY DAYS PER WEEK DO YOU ENGAGE IN MODERATE TO STRENUOUS EXERCISE (LIKE A BRISK WALK)?: 4 DAYS

## 2024-08-19 ASSESSMENT — SOCIAL DETERMINANTS OF HEALTH (SDOH)
HOW OFTEN DO YOU GET TOGETHER WITH FRIENDS OR RELATIVES?: MORE THAN THREE TIMES A WEEK
HOW OFTEN DO YOU GET TOGETHER WITH FRIENDS OR RELATIVES?: MORE THAN THREE TIMES A WEEK

## 2024-08-19 ASSESSMENT — ASTHMA QUESTIONNAIRES
QUESTION_1 LAST FOUR WEEKS HOW MUCH OF THE TIME DID YOUR ASTHMA KEEP YOU FROM GETTING AS MUCH DONE AT WORK, SCHOOL OR AT HOME: MOST OF THE TIME
QUESTION_4 LAST FOUR WEEKS HOW OFTEN HAVE YOU USED YOUR RESCUE INHALER OR NEBULIZER MEDICATION (SUCH AS ALBUTEROL): ONE OR TWO TIMES PER DAY
QUESTION_5 LAST FOUR WEEKS HOW WOULD YOU RATE YOUR ASTHMA CONTROL: SOMEWHAT CONTROLLED
ACT_TOTALSCORE: 10
QUESTION_3 LAST FOUR WEEKS HOW OFTEN DID YOUR ASTHMA SYMPTOMS (WHEEZING, COUGHING, SHORTNESS OF BREATH, CHEST TIGHTNESS OR PAIN) WAKE YOU UP AT NIGHT OR EARLIER THAN USUAL IN THE MORNING: TWO OR THREE NIGHTS A WEEK
QUESTION_2 LAST FOUR WEEKS HOW OFTEN HAVE YOU HAD SHORTNESS OF BREATH: MORE THAN ONCE A DAY
ACT_TOTALSCORE: 10

## 2024-08-19 NOTE — TELEPHONE ENCOUNTER
Prior Authorization Retail Medication Request    Medication/Dose:budesonide-formoterol (SYMBICORT) 160-4.5 MCG/ACT Inhaler    Diagnosis and ICD code (if different than what is on RX):    New/renewal/insurance change PA/secondary ins. PA:  Previously Tried and Failed:    Rationale:  insurance requires a PA    Insurance   Primary: [facility]  Insurance ID:  [id number]    Secondary (if applicable):  Insurance ID:      Pharmacy Information (if different than what is on RX)  Name:  same  Phone:    Fax:     go.[email or web address]/login  Key:[id number]  Patient Last name: MARY   1990

## 2024-08-19 NOTE — PROGRESS NOTES
Preventive Care Visit  [facility]  Wai Macias MD, Family Medicine  Aug 19, 2024      Assessment & Plan     Routine general medical examination at a health care facility    Type 2 diabetes mellitus with other specified complication, without long-term current use of insulin (H)  Discussed availability of GLP1 for help with weight loss and glucose control  She is interested.  No family history of thyroid cancer or MEN sydnrome.  - FOOT EXAM  - Lipid panel reflex to direct LDL Non-fasting; Future  - Albumin Random Urine Quantitative with Creat Ratio; Future  - HEMOGLOBIN A1C; Future  - Adult Eye  Referral; Future  - canagliflozin (INVOKANA) 300 MG tablet; Take 1 tablet (300 mg) by mouth every morning (before breakfast)  - glipiZIDE (GLUCOTROL) 10 MG tablet; Take 1 tablet (10 mg) by mouth 2 times daily (before meals)  - metFORMIN (GLUCOPHAGE) 1000 MG tablet; Take 1 tablet (1,000 mg) by mouth 2 times daily (with meals)  - tirzepatide (MOUNJARO) 2.5 MG/0.5ML pen; Inject 2.5 mg subcutaneously every 7 days for 28 days  - Hepatic panel (Albumin, ALT, AST, Bili, Alk Phos, TP); Future  - Vitamin B12; Future  - TSH with free T4 reflex; Future    Severe persistent asthma without complication (H28)  Persistently low ACT  Has Symbicort and uses it.  Advised to get COVID and flu shots in the fall (not available today).  - budesonide-formoterol (SYMBICORT) 160-4.5 MCG/ACT Inhaler; Inhale 2 puffs twice daily plus 1-2 puffs as needed. May use up to 12 puffs per day.  - Adult Pulmonary Medicine  Referral; Future    Encounter for contraceptive management, unspecified type  Elevated BP today.  Had gestational HTN.  Discussed estrogen and elevated BP.  Patient would be ok switiching to no estrogen contraception.  She would like LARC as IUD. Tolerated hormonal IUD well in the past.  Will schedule appt for IUD.  BP may improve when off estrogen, but will continue it for now, short term.  -  "levonorgestrel-ethinyl estradiol (AVIANE) 0.1-20 MG-MCG tablet; Take 1 tablet by mouth daily    Patient has been advised of split billing requirements and indicates understanding: Yes        BMI  Estimated body mass index is 33.13 kg/m  as calculated from the following:    Height as of this encounter: 1.48 m (4' 10.27\").    Weight as of this encounter: 72.6 kg (160 lb).     Counseling  Appropriate preventive services were addressed with this patient via screening, questionnaire, or discussion as appropriate for fall prevention, nutrition, physical activity, Tobacco-use cessation, social engagement, weight loss and cognition.  Checklist reviewing preventive services available has been given to the patient.  Reviewed patient's diet, addressing concerns and/or questions.     Melonie Farrell is a 34 year old, presenting for the following:  Physical        8/19/2024     8:15 AM   Additional Questions   Roomed by hser   Accompanied by self        HPI        Diabetes Follow-up  What concerns do you have today about your diabetes? None   Do you have any of these symptoms? (Select all that apply)  No numbness or tingling in feet.  No redness, sores or blisters on feet.  No complaints of excessive thirst.  No reports of blurry vision.  No significant changes to weight.  Have you had a diabetic eye exam in the last 12 months? No        BP Readings from Last 2 Encounters:   08/19/24 (!) 144/98   10/25/23 126/85     Hemoglobin A1C (%)   Date Value   10/25/2023 6.4 (H)   07/26/2023 8.1 (H)     LDL Cholesterol Calculated (mg/dL)   Date Value   02/02/2023 88   11/16/2021 157 (H)             Asthma Follow-Up    Was ACT completed today?  Yes        8/19/2024     8:19 AM   ACT Total Scores   ACT TOTAL SCORE (Goal Greater than or Equal to 20) 10   In the past 12 months, how many times did you visit the emergency room for your asthma without being admitted to the hospital? 0   In the past 12 months, how many times were you hospitalized " overnight because of your asthma? 0       How many days per week do you miss taking your asthma controller medication?  0  Please describe any recent triggers for your asthma: dust mites  Have you had any Emergency Room Visits, Urgent Care Visits, or Hospital Admissions since your last office visit?  No          8/19/2024   General Health   How would you rate your overall physical health? (!) FAIR   Feel stress (tense, anxious, or unable to sleep) To some extent      (!) STRESS CONCERN      8/19/2024   Nutrition   Three or more servings of calcium each day? Yes   Diet: Low fat/cholesterol   How many servings of fruit and vegetables per day? (!) 2-3   How many sweetened beverages each day? 0-1            8/19/2024   Exercise   Days per week of moderate/strenous exercise 4 days   Average minutes spent exercising at this level 30 min            8/19/2024   Social Factors   Frequency of gathering with friends or relatives More than three times a week   Worry food won't last until get money to buy more No   Food not last or not have enough money for food? No   Do you have housing? (Housing is defined as stable permanent housing and does not include staying ouside in a car, in a tent, in an abandoned building, in an overnight shelter, or couch-surfing.) Yes   Are you worried about losing your housing? No   Lack of transportation? No   Unable to get utilities (heat,electricity)? No            8/19/2024   Dental   Dentist two times every year? Yes            8/19/2024   TB Screening   Were you born outside of the [place]? Yes            Today's PHQ-2 Score:       8/19/2024     8:19 AM   PHQ-2 ( 1999 [employer])   Q1: Little interest or pleasure in doing things 0   Q2: Feeling down, depressed or hopeless 0   PHQ-2 Score 0           8/19/2024   Substance Use   Alcohol more than 3/day or more than 7/wk No   Do you use any other substances recreationally? No        Social History     Tobacco Use    Smoking status: Never     Passive  exposure: Never    Smokeless tobacco: Never    Tobacco comments:     BETTLE NUT   Vaping Use    Vaping status: Never Used   Substance Use Topics    Alcohol use: No    Drug use: No          Mammogram Screening - Patient under 40 years of age: Routine Mammogram Screening not recommended.         2024   STI Screening   New sexual partner(s) since last STI/HIV test? No        History of abnormal Pap smear: No - age 30- 64 PAP with HPV every 5 years recommended        Latest Ref Rng & Units 2021     3:00 PM 10/13/2017     4:44 PM 2015     2:17 PM   PAP / HPV   PAP Negative for squamous intraepithelial lesion or malignancy. Negative for squamous intraepithelial lesion or malignancy  Electronically signed by Esther Barksdale CT (ASCP) on 2021 at 11:30 AM    Negative for squamous intraepithelial lesion or malignancy  Electronically signed by Caty Keith CT (ASCP) on 10/18/2017 at  3:42 PM    Negative for squamous intraepithelial lesion or malignancy  Electronically signed by Esther Barksdale CT (ASCP) on 2015 at 11:15 AM      HPV 16 DNA NEG Negative      HPV 18 DNA NEG Negative      Other HR HPV NEG Negative              2024   Contraception/Family Planning   Questions about contraception or family planning No           Reviewed and updated as needed this visit by Provider   Tobacco     Med Hx  Surg Hx  Fam Hx  Soc Hx Sexual Activity          Past Medical History:   Diagnosis Date    GDM, class A2 2018    Gestational diabetes 2018    Hypertension 2015    preeclampsia vs. Gest HTN    Mild persistent asthma without complication 2021    Premenstrual tension syndromes     S/P  section 2018    Urinary tract infection      Past Surgical History:   Procedure Laterality Date    C/SECTION, LOW TRANSVERSE  10/2015     SECTION N/A 2018    Procedure:  SECTION, REPEAT;  Surgeon: Ronald Argueta MD;  Location: [facility];  Service:      REPEAT  SECTION  2018    REPEAT / T [facility]      OB History    Para Term  AB Living   2 2 2 0 0 2   SAB IAB Ectopic Multiple Live Births   0 0 0 0 2      # Outcome Date GA Lbr Carlos/2nd Weight Sex Type Anes PTL Lv   2 Term 18 38w0d  3.91 kg (8 lb 9.9 oz) F CS-LTranv Spinal  CADENCE      Name: MARY,FEMALE-PADMAJA      Apgar1: 8  Apgar5: 9   1 Term 10/25/15 38w2d  3.572 kg (7 lb 14 oz) F CS-LTranv   CADENCE      Complications: Failure to Progress in First Stage      Name: Raquel Patricio      Apgar1: 8  Apgar5: 8     BP Readings from Last 3 Encounters:   24 (!) 144/98   10/25/23 126/85   23 132/84    Wt Readings from Last 3 Encounters:   24 72.6 kg (160 lb)   10/25/23 72.6 kg (160 lb)   23 71.7 kg (158 lb)          Patient Active Problem List   Diagnosis    Type 2 diabetes mellitus with other specified complication, without long-term current use of insulin (H)    Class 1 obesity due to excess calories with serious comorbidity and body mass index (BMI) of 30.0 to 30.9 in adult    Elevated liver enzymes    Mild persistent asthma without complication    Language barrier     Past Surgical History:   Procedure Laterality Date    C/SECTION, LOW TRANSVERSE  10/2015     SECTION N/A 2018    Procedure:  SECTION, REPEAT;  Surgeon: Ronald Argueta MD;  Location: [facility];  Service:     REPEAT  SECTION  2018    REPEAT / T [facility]        Social History     Tobacco Use    Smoking status: Never     Passive exposure: Never    Smokeless tobacco: Never    Tobacco comments:     BETTLE NUT   Substance Use Topics    Alcohol use: No     Family History   Problem Relation Age of Onset    Heart Defect Sister     No Known Problems Brother          Current Outpatient Medications   Medication Sig Dispense Refill    albuterol (PROAIR HFA;PROVENTIL HFA;VENTOLIN HFA) 90 mcg/actuation inhaler [ALBUTEROL (PROAIR HFA;PROVENTIL HFA;VENTOLIN HFA) 90  MCG/ACTUATION INHALER] Inhale 2 puffs every 6 (six) hours as needed for wheezing. 1 each 2    budesonide-formoterol (SYMBICORT) 160-4.5 MCG/ACT Inhaler Inhale 2 puffs twice daily plus 1-2 puffs as needed. May use up to 12 puffs per day. 20.4 g 11    canagliflozin (INVOKANA) 300 MG tablet Take 1 tablet (300 mg) by mouth every morning (before breakfast) 30 tablet 0    CONTOUR NEXT TEST test strip Use to test blood sugar 2 times daily. 200 strip 4    glipiZIDE (GLUCOTROL) 10 MG tablet Take 1 tablet (10 mg) by mouth 2 times daily (before meals) 60 tablet 0    levonorgestrel-ethinyl estradiol (AVIANE) 0.1-20 MG-MCG tablet Take 1 tablet by mouth daily 90 tablet 3    metFORMIN (GLUCOPHAGE) 1000 MG tablet Take 1 tablet (1,000 mg) by mouth 2 times daily (with meals) 60 tablet 0    Microlet Lancets MISC 100 each 2 times daily Use to test blood sugar 2 times daily. 100 each 4    OneTouch Delica Lancets 33G MISC 1 each daily 200 each 4    pioglitazone (ACTOS) 15 MG tablet TAKE 1 TABLET (15 MG) BY MOUTH DAILY 30 tablet 0    tirzepatide (MOUNJARO) 2.5 MG/0.5ML pen Inject 2.5 mg subcutaneously every 7 days for 28 days 2 mL 0     Allergies   Allergen Reactions    Dust Mites      Recent Labs   Lab Test 11/03/23  1054 10/25/23  1115 07/26/23  1349 02/02/23  1452 08/02/22  1519 03/15/22  1548 02/01/22  1546 11/16/21  1443 05/26/21  0949 04/28/21  0946 01/21/21  1555 08/19/20  1606   A1C  --  6.4* 8.1* 7.0* 7.7*   < > 7.5* 7.7*   < > 8.9* 8.2* 8.0*   LDL  --   --   --  88  --   --   --  157*  --   --   --  100   HDL  --   --   --  46*  --   --   --  52  --   --   --  39*   TRIG  --   --   --  304*  --   --   --  158*  --   --   --  372*   ALT 33  --  71*  --  68*  --   --  71*  --  104* 123* 147*   CR  --  0.59 0.58 0.57 0.50*  --  0.67 0.67   < > 0.71 0.74 0.75   GFRESTIMATED  --  >90 >90 >90 >90  --  >90 >90   < > >60 >60 >60   GFRESTBLACK  --   --   --   --   --   --   --   --   --  >60 >60 >60   POTASSIUM  --  4.1 3.7 3.8 3.9   < >  "3.9 4.0   < > 4.2 4.3 3.8   TSH  --   --   --  0.75  --   --  0.91  --   --   --   --   --     < > = values in this interval not displayed.         Objective    Exam  LMP  (LMP Unknown)    Estimated body mass index is 33.13 kg/m  as calculated from the following:    Height as of 10/25/23: 1.48 m (4' 10.27\").    Weight as of 10/25/23: 72.6 kg (160 lb).    BP Readings from Last 6 Encounters:   08/19/24 (!) 144/98   10/25/23 126/85   07/26/23 132/84   02/02/23 125/84   12/28/22 126/80   08/02/22 122/81     Wt Readings from Last 6 Encounters:   08/19/24 72.6 kg (160 lb)   10/25/23 72.6 kg (160 lb)   07/26/23 71.7 kg (158 lb)   02/02/23 73.5 kg (162 lb)   12/28/22 74.8 kg (165 lb)   08/02/22 73.9 kg (163 lb)       Physical Exam  Gen:   Alert, not distressed  Head:   Normocephalic, without obvious abnormality, atraumatic  Eyes:   PERRL, conjunctiva/corneas clear, EOM's intact  Ears:   Normal tympanic membranes and external ear canals  Nose:    Mucosa normal, no drainage or sinus tenderness  Throat:    No erythema or exudates  Neck:    No adenopathy no nodules in thyroid, normal ROM  Lungs:    Clear to auscultation bilaterally, respirations unlabored  Chest wall:  No tenderness or deformity  Heart:     Regular, normal S1 and S2, no murmur, gallop or rub  Abdomen:  Soft, non-tender, normal bowel sounds, no masses, no organomegaly  Back:    Symmetric, no curvature, ROM normal, no CVA tenderness  Extremities:   Extremities normal, atraumatic, no cyanosis or edema  Skin:     Skin color, texture, turgor normal, no rashes or lesions  Lymph nodes:   Cervical and supraclavicular nodes normal  Neurologic:   CNII-XII intact.   DTRs normal and symmetric.  Symmetric strength and sensation.    FOOT EXAM:  DP and PT pulses are normal.  Monofilament testing normal  Nails normal.  Hair growth present.  No skin abnormalities.        Signed Electronically by: Wai Macias MD      Prior to immunization administration, verified patients " identity using patient s name and date of birth. Please see Immunization Activity for additional information.     Screening Questionnaire for Adult Immunization    Are you sick today?   No   Do you have allergies to medications, food, a vaccine component or latex?   No   Have you ever had a serious reaction after receiving a vaccination?   No   Do you have a long-term health problem with heart, lung, kidney, or metabolic disease (e.g., diabetes), asthma, a blood disorder, no spleen, complement component deficiency, a cochlear implant, or a spinal fluid leak?  Are you on long-term aspirin therapy?   Yes   Do you have cancer, leukemia, HIV/AIDS, or any other immune system problem?   No   Do you have a parent, brother, or sister with an immune system problem?   No   In the past 3 months, have you taken medications that affect  your immune system, such as prednisone, other steroids, or anticancer drugs; drugs for the treatment of rheumatoid arthritis, Crohn s disease, or psoriasis; or have you had radiation treatments?   No   Have you had a seizure, or a brain or other nervous system problem?   No   During the past year, have you received a transfusion of blood or blood    products, or been given immune (gamma) globulin or antiviral drug?   No   For women: Are you pregnant or is there a chance you could become       pregnant during the next month?   No   Have you received any vaccinations in the past 4 weeks?   No     Immunization questionnaire was positive for at least one answer.  Notified provider.      Patient instructed to remain in clinic for 15 minutes afterwards, and to report any adverse reactions.     Screening performed by Isabelle Prince MA on 8/19/2024 at 8:19 AM.

## 2024-08-19 NOTE — PATIENT INSTRUCTIONS
Patient Education   You have been provided the Preventive Care Advice document.    Additional copies can be found here: [email or web address][email or web address]  Preventive Care Advice   This is general advice given by our system to help you stay healthy. However, your care team may have specific advice just for you. Please talk to your care team about your preventive care needs.  Nutrition  Eat 5 or more servings of fruits and vegetables each day.  Try wheat bread, brown rice and whole grain pasta (instead of white bread, rice, and pasta).  Get enough calcium and vitamin D. Check the label on foods and aim for 100% of the RDA (recommended daily allowance).  Lifestyle  Exercise at least 150 minutes each week  (30 minutes a day, 5 days a week).  Do muscle strengthening activities 2 days a week. These help control your weight and prevent disease.  No smoking.  Wear sunscreen to prevent skin cancer.  Have a dental exam and cleaning every 6 months.  Yearly exams  See your health care team every year to talk about:  Any changes in your health.  Any medicines your care team has prescribed.  Preventive care, family planning, and ways to prevent chronic diseases.  Shots (vaccines)   HPV shots (up to age 26), if you've never had them before.  Hepatitis B shots (up to age 59), if you've never had them before.  COVID-19 shot: Get this shot when it's due.  Flu shot: Get a flu shot every year.  Tetanus shot: Get a tetanus shot every 10 years.  Pneumococcal, hepatitis A, and RSV shots: Ask your care team if you need these based on your risk.  Shingles shot (for age 50 and up)  General health tests  Diabetes screening:  Starting at age 35, Get screened for diabetes at least every 3 years.  If you are younger than age 35, ask your care team if you should be screened for diabetes.  Cholesterol test: At age 39, start having a cholesterol test every 5 years, or more often if advised.  Bone density scan (DEXA): At age 50, ask your care team if you  should have this scan for osteoporosis (brittle bones).  Hepatitis C: Get tested at least once in your life.  STIs (sexually transmitted infections)  Before age 24: Ask your care team if you should be screened for STIs.  After age 24: Get screened for STIs if you're at risk. You are at risk for STIs (including HIV) if:  You are sexually active with more than one person.  You don't use condoms every time.  You or a partner was diagnosed with a sexually transmitted infection.  If you are at risk for HIV, ask about PrEP medicine to prevent HIV.  Get tested for HIV at least once in your life, whether you are at risk for HIV or not.  Cancer screening tests  Cervical cancer screening: If you have a cervix, begin getting regular cervical cancer screening tests starting at age 21.  Breast cancer scan (mammogram): If you've ever had breasts, begin having regular mammograms starting at age 40. This is a scan to check for breast cancer.  Colon cancer screening: It is important to start screening for colon cancer at age 45.  Have a colonoscopy test every 10 years (or more often if you're at risk) Or, ask your provider about stool tests like a FIT test every year or Cologuard test every 3 years.  To learn more about your testing options, visit:   .  For help making a decision, visit:   https://bit.ly/rw40238.  Prostate cancer screening test: If you have a prostate, ask your care team if a prostate cancer screening test (PSA) at age 55 is right for you.  Lung cancer screening: If you are a current or former smoker ages 50 to 80, ask your care team if ongoing lung cancer screenings are right for you.  For informational purposes only. Not to replace the advice of your health care provider. Copyright   2023 [employer]. All rights reserved. Clinically reviewed by the [facility] Transitions Program. [employer] 721987 - REV 01/24.

## 2024-08-20 NOTE — TELEPHONE ENCOUNTER
Retail Pharmacy Prior Authorization Team   Phone: [phone]    PRIOR AUTHORIZATION DENIED    Medication: MOUNJARO 2.5 MG/0.5ML SC SOPN  Insurance Company: [employer] - Phone [phone] Fax [phone]  Denial Date: 8/19/2024  Denial Reason(s): MUST TRY/FAIL TWO PREFERRED ALTERNATIVES -       Appeal Information: IF THE PROVIDER WOULD LIKE TO APPEAL THIS DECISION PLEASE PROVIDE THE PA TEAM WITH A LETTER OF MEDICAL NECESSITY      Patient Notified: NO

## 2024-08-21 NOTE — TELEPHONE ENCOUNTER
PA Initiation    Medication: BUDESONIDE-FORMOTEROL FUMARATE 160-4.5 MCG/ACT IN AERO  Insurance Company: [employer] - Phone [phone] Fax [phone]  Pharmacy Filling the Rx: [facility] - [place], [place] - [address] [place] [address]  Filling Pharmacy Phone: [phone]  Filling Pharmacy Fax:    Start Date: 8/21/2024

## 2024-08-22 NOTE — TELEPHONE ENCOUNTER
Prior Authorization Not Needed per Insurance    Medication: BUDESONIDE-FORMOTEROL FUMARATE 160-4.5 MCG/ACT IN AERO  Insurance Company: [employer] - Phone [phone] Fax [phone]  Expected CoPay: $    Pharmacy Filling the Rx: [facility] - [place], [place] - [address] [place] [address]  Pharmacy Notified: YES  Patient Notified: NO

## 2024-08-23 NOTE — TELEPHONE ENCOUNTER
Contacted patient using an  and relayed message beow.  No further action needed    Veronica Lazaro RN  [facility]    Dr Macias  Changes prescription to ozempic  Please let patient know

## 2024-08-27 RX ORDER — ATORVASTATIN CALCIUM 40 MG/1
40 TABLET, FILM COATED ORAL DAILY
Qty: 90 TABLET | Refills: 1 | Status: SHIPPED | OUTPATIENT
Start: 2024-08-27

## 2024-08-28 ENCOUNTER — TELEPHONE (OUTPATIENT)
Dept: FAMILY MEDICINE | Facility: CLINIC | Age: 34
End: 2024-08-28
Payer: COMMERCIAL

## 2024-08-28 NOTE — TELEPHONE ENCOUNTER
----- Message from Wai Macias sent at 8/27/2024  9:58 PM CDT -----  Call:  Blood sugar control looks good.  Cholesterol fairly high.  I think we should consider a cholesterol medicine.  I will send a prescription for atorvastatin.  Please take that for about a month and then lets recheck.

## 2024-08-29 NOTE — LETTER
August 29, 2024  Re: Jami TERRY Suresh  YOB: 1990    Dear Colleague,    Thank you for your referral to the [facility]. We have been unable to schedule the referral after several contact attempts.      If you have any questions or concerns, please contact our office at Dept: [phone].        Sincerely,  [facility]

## 2024-09-05 NOTE — TELEPHONE ENCOUNTER
Called patient, relayed message. 1 month follow-up scheduled with PCP.    Adela : [id number]    Future Appointments 9/5/2024 - 3/4/2025        Date Visit Type Length Department Provider     9/9/2024 10:30 AM RN VISIT 30 min [facility] SPR[facility] RN 1    Location Instructions:     [facility] is located at [address] [place] [address] in [place], at the intersection of [facility]. This is one block south of the [facility]. Free parking is available in the lot directly north of the clinic across [facility]. The clinic is near stops along bus [address] and [address].              9/9/2024 11:00 AM IUD PLACEMENT OR REPLACEMENT 20 min [facility] Sandra Rivera MD     9/9/2024 11:00 AM IUD PLACEMENT OR REPLACEMENT 20 min UR  SERVICE PHONE,     Location Instructions:     [facility] is located at [address] [place] [address] in [place], at the intersection of [facility]. This is one block south of the [facility]. Free parking is available in the lot directly north of the clinic across [facility]. The clinic is near stops along bus [address] and [address].              10/15/2024 11:20 AM OFFICE VISIT 20 min [facility] Wai Macias MD    Location Instructions:     [facility] is located at [address] [place] [address] in [place], at the intersection of [facility]. This is one block south of the [facility]. Free parking is available in the lot directly north of the clinic across [facility]. The clinic is near stops along bus [address] and [address].                   Veronica Benitez RN  [facility]

## 2024-09-09 ENCOUNTER — OFFICE VISIT (OUTPATIENT)
Dept: FAMILY MEDICINE | Facility: CLINIC | Age: 34
End: 2024-09-09
Payer: COMMERCIAL

## 2024-09-09 ENCOUNTER — ALLIED HEALTH/NURSE VISIT (OUTPATIENT)
Dept: FAMILY MEDICINE | Facility: CLINIC | Age: 34
End: 2024-09-09
Payer: COMMERCIAL

## 2024-09-09 VITALS
TEMPERATURE: 97.4 F | SYSTOLIC BLOOD PRESSURE: 135 MMHG | BODY MASS INDEX: 33.13 KG/M2 | RESPIRATION RATE: 16 BRPM | OXYGEN SATURATION: 98 % | HEIGHT: 58 IN | DIASTOLIC BLOOD PRESSURE: 87 MMHG

## 2024-09-09 VITALS — HEART RATE: 89 BPM | SYSTOLIC BLOOD PRESSURE: 140 MMHG | DIASTOLIC BLOOD PRESSURE: 100 MMHG

## 2024-09-09 DIAGNOSIS — Z30.430 ENCOUNTER FOR INTRAUTERINE DEVICE PLACEMENT: Primary | ICD-10-CM

## 2024-09-09 DIAGNOSIS — I10 BENIGN ESSENTIAL HYPERTENSION: Primary | ICD-10-CM

## 2024-09-09 LAB — HCG UR QL: NEGATIVE

## 2024-09-09 PROCEDURE — 58300 INSERT INTRAUTERINE DEVICE: CPT | Performed by: FAMILY MEDICINE

## 2024-09-09 PROCEDURE — 99207 PR NO CHARGE NURSE ONLY: CPT

## 2024-09-09 PROCEDURE — 81025 URINE PREGNANCY TEST: CPT | Performed by: FAMILY MEDICINE

## 2024-09-09 ASSESSMENT — PAIN SCALES - GENERAL: PAINLEVEL: NO PAIN (0)

## 2024-09-09 NOTE — PROGRESS NOTES
Jami Prince is a 34 year old year old patient who comes in today for a Blood Pressure check because of ongoing blood pressure monitoring.  Vital Signs as repeated by /100  Patient is not taking medication.   Patient is not monitoring Blood Pressure at home.  Current complaints: light-headedness on and off. May not be drinking enough water  Patient has an IUD placement right after BP check with Dr Yan. Patient is nervous prior to IUD insertion  Disposition:  Message routed to Dr Macias for review / plan    Veronica Lazaro RN  [facility]    BP Readings from Last 3 Encounters:   09/09/24 135/87   09/09/24 (!) 140/100   08/19/24 (!) 144/98

## 2024-09-09 NOTE — PROGRESS NOTES
"  1. Encounter for intrauterine device placement  This is a 35 yo female who desires IUD (Mirena) placement.  Negative urine pregnancy test.  Recent (normal) period.    - HCG qualitative urine; Future  - HCG qualitative urine  - levonorgestrel (MIRENA) 52 MG (20 mcg/day) IUD 1 each  - GYN: Insert IUD    GYN: Insert IUD    Date/Time: 9/9/2024 12:15 AM    Performed by: Sandra Rivera MD  Authorized by: Sandra Rivera MD    Consent:     Consent obtained:  Verbal and written    Consent given by:  Patient    Procedure risks and benefits discussed: yes      Patient questions answered: yes      Patient agrees, verbalizes understanding, and wants to proceed: yes      Instructions and paperwork completed: yes    Procedure:     Negative urine pregnancy test: yes      Cervix cleaned and prepped: yes      Speculum placed in vagina: yes      Tenaculum applied to cervix: yes      Uterus sounded: yes      Uterus sound depth (cm):  6.5    IUD inserted with no complications: Initial IUD inserted easily but came back out with the ; second IUD was placed without difficulty.      IUD type:  Mirena (Lot #[id number])    Strings trimmed: yes    Post-procedure:     Patient tolerated procedure well: yes      Patient will follow up after next period: yes    Comments:      Initial IUD inserted easily but when I went to remove the  device, the IUD was still stuck in the device.          Melonie Farrell is a 34 year old, presenting for the following health issues:  IUD (Place ) and Recheck Medication (Not sure of the names of meds )        9/9/2024    11:03 AM   Additional Questions   Roomed by Swetha     Had Mirena x 5 years - liked that  Then got Paragard IUD x 1+ years - didn't like it - had pain inside  Now wants the \"5 year one\" again    LMP ended on Thursday September 5 - started Monday September 2, evening   Normal period because \"I took the medication\"  Was on OCP apparently and period came " "at normal time   Still on OCP              Review of Systems   Constitutional:  Negative for chills and fever.   HENT:  Negative for ear pain and sore throat.    Eyes:  Positive for discharge. Negative for pain and visual disturbance.   Respiratory:  Negative for cough and shortness of breath.    Cardiovascular:  Negative for chest pain and palpitations.   Gastrointestinal:  Negative for abdominal pain and vomiting.   Genitourinary:  Negative for dysuria and hematuria.   Musculoskeletal:  Negative for arthralgias and back pain.   Skin:  Negative for color change and rash.   Neurological:  Negative for seizures and syncope.   All other systems reviewed and are negative.          Objective    /87   Temp 97.4  F (36.3  C) (Oral)   Resp 16   Ht 1.48 m (4' 10.27\")   LMP 09/05/2024   SpO2 98%   BMI 33.13 kg/m    Body mass index is 33.13 kg/m .  Physical Exam  Vitals reviewed.   Constitutional:       Appearance: Normal appearance. She is not ill-appearing.   Eyes:      Extraocular Movements: Extraocular movements intact.   Cardiovascular:      Rate and Rhythm: Normal rate.   Pulmonary:      Effort: Pulmonary effort is normal.   Abdominal:      Palpations: Abdomen is soft.      Tenderness: There is no abdominal tenderness.   Genitourinary:     Comments: PELVIC EXAM:External genitalia: normal  Vaginal mucosa normal  Vaginal discharge: none  Speculum exam shows a normal appearing cervix .   Bimanual exam: Cervix closed, firm, non tender  to motion.    Neurological:      General: No focal deficit present.      Mental Status: She is alert.            Results for orders placed or performed in visit on 09/09/24   HCG qualitative urine     Status: Normal   Result Value Ref Range    hCG Urine Qualitative Negative Negative         Prior to immunization administration, verified patients identity using patient s name and date of birth. Please see Immunization Activity for additional information.     Screening Questionnaire " for Adult Immunization    Are you sick today?   No   Do you have allergies to medications, food, a vaccine component or latex?   No   Have you ever had a serious reaction after receiving a vaccination?   No   Do you have a long-term health problem with heart, lung, kidney, or metabolic disease (e.g., diabetes), asthma, a blood disorder, no spleen, complement component deficiency, a cochlear implant, or a spinal fluid leak?  Are you on long-term aspirin therapy?   No   Do you have cancer, leukemia, HIV/AIDS, or any other immune system problem?   No   Do you have a parent, brother, or sister with an immune system problem?   No   In the past 3 months, have you taken medications that affect  your immune system, such as prednisone, other steroids, or anticancer drugs; drugs for the treatment of rheumatoid arthritis, Crohn s disease, or psoriasis; or have you had radiation treatments?   No   Have you had a seizure, or a brain or other nervous system problem?   No   During the past year, have you received a transfusion of blood or blood    products, or been given immune (gamma) globulin or antiviral drug?   No   For women: Are you pregnant or is there a chance you could become       pregnant during the next month?   No   Have you received any vaccinations in the past 4 weeks?   No     Immunization questionnaire answers were all negative.      Patient instructed to remain in clinic for 15 minutes afterwards, and to report any adverse reactions.     Screening performed by Swetha Coulter MA on 9/9/2024 at 11:07 AM.       Signed Electronically by: CONCETTA MCCABE MD

## 2024-09-09 NOTE — Clinical Note
BP check. Patient has an IUD placement right after BP check with Dr Yan. Patient is nervous prior to IUD insertion Disposition:  Message routed to Dr Macias for review / plan

## 2024-09-09 NOTE — Clinical Note
Patient has an IUD placement right after BP check with Dr Yan. Patient is nervous prior to IUD insertion Disposition:  Message routed to Dr Macias for review / plan

## 2024-09-10 DIAGNOSIS — J45.909 ASTHMA: Primary | ICD-10-CM

## 2024-09-12 ASSESSMENT — ENCOUNTER SYMPTOMS
COLOR CHANGE: 0
SORE THROAT: 0
CHILLS: 0
BACK PAIN: 0
FEVER: 0
PALPITATIONS: 0
COUGH: 0
ARTHRALGIAS: 0
SHORTNESS OF BREATH: 0
DYSURIA: 0
SEIZURES: 0
HEMATURIA: 0
ABDOMINAL PAIN: 0
EYE DISCHARGE: 1
EYE PAIN: 0
VOMITING: 0

## 2024-10-15 ENCOUNTER — OFFICE VISIT (OUTPATIENT)
Dept: FAMILY MEDICINE | Facility: CLINIC | Age: 34
End: 2024-10-15
Payer: COMMERCIAL

## 2024-10-15 VITALS
HEART RATE: 95 BPM | TEMPERATURE: 97.5 F | RESPIRATION RATE: 14 BRPM | SYSTOLIC BLOOD PRESSURE: 130 MMHG | HEIGHT: 58 IN | OXYGEN SATURATION: 99 % | DIASTOLIC BLOOD PRESSURE: 82 MMHG | WEIGHT: 153 LBS | BODY MASS INDEX: 32.12 KG/M2

## 2024-10-15 DIAGNOSIS — J45.30 MILD PERSISTENT ASTHMA WITHOUT COMPLICATION: ICD-10-CM

## 2024-10-15 DIAGNOSIS — E11.69 TYPE 2 DIABETES MELLITUS WITH OTHER SPECIFIED COMPLICATION, WITHOUT LONG-TERM CURRENT USE OF INSULIN (H): Primary | ICD-10-CM

## 2024-10-15 DIAGNOSIS — R30.0 DYSURIA: ICD-10-CM

## 2024-10-15 DIAGNOSIS — E11.69 TYPE 2 DIABETES MELLITUS WITH OTHER SPECIFIED COMPLICATION, WITHOUT LONG-TERM CURRENT USE OF INSULIN (H): ICD-10-CM

## 2024-10-15 LAB
ALBUMIN UR-MCNC: NEGATIVE MG/DL
ANION GAP SERPL CALCULATED.3IONS-SCNC: 11 MMOL/L (ref 7–15)
APPEARANCE UR: CLEAR
BACTERIA #/AREA URNS HPF: ABNORMAL /HPF
BILIRUB UR QL STRIP: NEGATIVE
BUN SERPL-MCNC: 15.2 MG/DL (ref 6–20)
CALCIUM SERPL-MCNC: 9.5 MG/DL (ref 8.8–10.4)
CHLORIDE SERPL-SCNC: 104 MMOL/L (ref 98–107)
COLOR UR AUTO: YELLOW
CREAT SERPL-MCNC: 0.57 MG/DL (ref 0.51–0.95)
EGFRCR SERPLBLD CKD-EPI 2021: >90 ML/MIN/1.73M2
EST. AVERAGE GLUCOSE BLD GHB EST-MCNC: 143 MG/DL
GLUCOSE SERPL-MCNC: 124 MG/DL (ref 70–99)
GLUCOSE UR STRIP-MCNC: >=1000 MG/DL
HBA1C MFR BLD: 6.6 % (ref 0–5.6)
HCO3 SERPL-SCNC: 20 MMOL/L (ref 22–29)
HGB UR QL STRIP: NEGATIVE
KETONES UR STRIP-MCNC: NEGATIVE MG/DL
LEUKOCYTE ESTERASE UR QL STRIP: NEGATIVE
NITRATE UR QL: NEGATIVE
PH UR STRIP: 5 [PH] (ref 5–8)
POTASSIUM SERPL-SCNC: 4.2 MMOL/L (ref 3.4–5.3)
RBC #/AREA URNS AUTO: ABNORMAL /HPF
SODIUM SERPL-SCNC: 135 MMOL/L (ref 135–145)
SP GR UR STRIP: 1.01 (ref 1–1.03)
SQUAMOUS #/AREA URNS AUTO: ABNORMAL /LPF
UROBILINOGEN UR STRIP-ACNC: 0.2 E.U./DL
WBC #/AREA URNS AUTO: ABNORMAL /HPF

## 2024-10-15 PROCEDURE — 80048 BASIC METABOLIC PNL TOTAL CA: CPT | Performed by: FAMILY MEDICINE

## 2024-10-15 PROCEDURE — 87086 URINE CULTURE/COLONY COUNT: CPT | Performed by: FAMILY MEDICINE

## 2024-10-15 PROCEDURE — 81001 URINALYSIS AUTO W/SCOPE: CPT | Performed by: FAMILY MEDICINE

## 2024-10-15 PROCEDURE — 83036 HEMOGLOBIN GLYCOSYLATED A1C: CPT | Performed by: FAMILY MEDICINE

## 2024-10-15 PROCEDURE — 36415 COLL VENOUS BLD VENIPUNCTURE: CPT | Performed by: FAMILY MEDICINE

## 2024-10-15 PROCEDURE — 99214 OFFICE O/P EST MOD 30 MIN: CPT | Mod: 25 | Performed by: FAMILY MEDICINE

## 2024-10-15 PROCEDURE — 80061 LIPID PANEL: CPT | Performed by: FAMILY MEDICINE

## 2024-10-15 PROCEDURE — 90656 IIV3 VACC NO PRSV 0.5 ML IM: CPT | Performed by: FAMILY MEDICINE

## 2024-10-15 PROCEDURE — 90471 IMMUNIZATION ADMIN: CPT | Performed by: FAMILY MEDICINE

## 2024-10-15 RX ORDER — CANAGLIFLOZIN 300 MG/1
300 TABLET, FILM COATED ORAL
Qty: 90 TABLET | Refills: 1 | Status: SHIPPED | OUTPATIENT
Start: 2024-10-15

## 2024-10-15 ASSESSMENT — ASTHMA QUESTIONNAIRES
QUESTION_1 LAST FOUR WEEKS HOW MUCH OF THE TIME DID YOUR ASTHMA KEEP YOU FROM GETTING AS MUCH DONE AT WORK, SCHOOL OR AT HOME: A LITTLE OF THE TIME
QUESTION_4 LAST FOUR WEEKS HOW OFTEN HAVE YOU USED YOUR RESCUE INHALER OR NEBULIZER MEDICATION (SUCH AS ALBUTEROL): TWO OR THREE TIMES PER WEEK
ACT_TOTALSCORE: 19
QUESTION_3 LAST FOUR WEEKS HOW OFTEN DID YOUR ASTHMA SYMPTOMS (WHEEZING, COUGHING, SHORTNESS OF BREATH, CHEST TIGHTNESS OR PAIN) WAKE YOU UP AT NIGHT OR EARLIER THAN USUAL IN THE MORNING: ONCE OR TWICE
QUESTION_2 LAST FOUR WEEKS HOW OFTEN HAVE YOU HAD SHORTNESS OF BREATH: ONCE OR TWICE A WEEK
ACT_TOTALSCORE: 19
QUESTION_5 LAST FOUR WEEKS HOW WOULD YOU RATE YOUR ASTHMA CONTROL: WELL CONTROLLED

## 2024-10-15 ASSESSMENT — ENCOUNTER SYMPTOMS: HEMATURIA: 1

## 2024-10-15 NOTE — PROGRESS NOTES
"  Assessment & Plan     Type 2 diabetes mellitus with other specified complication, without long-term current use of insulin (H)  Renewed somatic with food.  Continue at 0.5 for now.  Potential to increase later.  Recheck lipids.    Dysuria  UA not overwhelming with evidence for infection.  Culture urine.  Not suggestive of UTI.  Continue to monitor.    Mild persistent asthma without complication  Reviewed PFTs.  Reasonable improvement with current medications.  For now continue  Symbicort  Albuterol    Influenza vaccine provided    BMI  Estimated body mass index is 31.98 kg/m  as calculated from the following:    Height as of this encounter: 1.473 m (4' 10\").    Weight as of this encounter: 69.4 kg (153 lb).     Melonie Farrell is a 34 year old, presenting for the following health issues:  Hypertension, Diabetes, Lipids, Follow Up, and Hematuria        10/15/2024    11:23 AM   Additional Questions   Roomed by SHSER   Accompanied by SELF     History of Present Illness       Diabetes:   She presents for follow up of diabetes.  She is checking home blood glucose one time daily.   She checks blood glucose before meals.  Blood glucose is sometimes over 200 and never under 70. She is aware of hypoglycemia symptoms including shakiness, dizziness and weakness.    She has no concerns regarding her diabetes at this time.   She is not experiencing numbness or burning in feet, excessive thirst, blurry vision, weight changes or redness, sores or blisters on feet. The patient has not had a diabetic eye exam in the last 12 months.          Hyperlipidemia:  She presents for follow up of hyperlipidemia.   She is taking medication to lower cholesterol. She is not having myalgia or other side effects to statin medications.    Hypertension: She presents for follow up of hypertension.  She does check blood pressure  regularly outside of the clinic. Outside blood pressures have been over 140/90. She follows a low salt diet.     She " "eats 2-3 servings of fruits and vegetables daily.She consumes 0 sweetened beverage(s) daily.She exercises with enough effort to increase her heart rate 10 to 19 minutes per day.  She exercises with enough effort to increase her heart rate 4 days per week.   She is taking medications regularly.     On SGLT2 and GLP-1.  Discussed dose of some dose of Ozempic.    Having some dysuria.  No fever.  No back pain.  No increased urinary frequency.  Started yesterday.      Asthma Follow-Up    Was ACT completed today?  Yes        10/15/2024    10:55 AM   ACT Total Scores   ACT TOTAL SCORE (Goal Greater than or Equal to 20) 19   In the past 12 months, how many times did you visit the emergency room for your asthma without being admitted to the hospital? 0   In the past 12 months, how many times were you hospitalized overnight because of your asthma? 0     Having some reasonable improvement.  Nearly at asthma goal.  Would not change medications at this time.        Objective    /82 (BP Location: Right arm, Patient Position: Sitting, Cuff Size: Adult Regular)   Pulse 95   Temp 97.5  F (36.4  C) (Temporal)   Resp 14   Ht 1.473 m (4' 10\")   Wt 69.4 kg (153 lb)   LMP 09/05/2024   SpO2 99%   BMI 31.98 kg/m    Body mass index is 31.98 kg/m .    Wt Readings from Last 3 Encounters:   10/15/24 69.4 kg (153 lb)   08/19/24 72.6 kg (160 lb)   10/25/23 72.6 kg (160 lb)        Physical Exam   GENERAL: alert, not distressed  CHEST: clear, no rales, rhonchi, or wheezes  CARDIAC: regular without murmur, gallop, or rub  ABDOMEN: soft, non tender, non distended, normal bowel sounds      Results for orders placed or performed in visit on 10/15/24   BASIC METABOLIC PANEL     Status: Abnormal   Result Value Ref Range    Sodium 135 135 - 145 mmol/L    Potassium 4.2 3.4 - 5.3 mmol/L    Chloride 104 98 - 107 mmol/L    Carbon Dioxide (CO2) 20 (L) 22 - 29 mmol/L    Anion Gap 11 7 - 15 mmol/L    Urea Nitrogen 15.2 6.0 - 20.0 mg/dL    " Creatinine 0.57 0.51 - 0.95 mg/dL    GFR Estimate >90 >60 mL/min/1.73m2    Calcium 9.5 8.8 - 10.4 mg/dL    Glucose 124 (H) 70 - 99 mg/dL   Hemoglobin A1c     Status: Abnormal   Result Value Ref Range    Estimated Average Glucose 143 (H) <117 mg/dL    Hemoglobin A1C 6.6 (H) 0.0 - 5.6 %   UA with Microscopic - lab collect     Status: Abnormal   Result Value Ref Range    Color Urine Yellow Colorless, Straw, Light Yellow, Yellow    Appearance Urine Clear Clear    Glucose Urine >=1000 (A) Negative mg/dL    Bilirubin Urine Negative Negative    Ketones Urine Negative Negative mg/dL    Specific Gravity Urine 1.015 1.005 - 1.030    Blood Urine Negative Negative    pH Urine 5.0 5.0 - 8.0    Protein Albumin Urine Negative Negative mg/dL    Urobilinogen Urine 0.2 0.2, 1.0 E.U./dL    Nitrite Urine Negative Negative    Leukocyte Esterase Urine Negative Negative   Urine Microscopic Exam     Status: Abnormal   Result Value Ref Range    Bacteria Urine Few (A) None Seen /HPF    RBC Urine 2-5 (A) 0-2 /HPF /HPF    WBC Urine 0-5 0-5 /HPF /HPF    Squamous Epithelials Urine Moderate (A) None Seen /LPF   Urine Culture Aerobic Bacterial     Status: None    Specimen: Urine, Clean Catch   Result Value Ref Range    Culture <10,000 CFU/mL Urogenital charley            Signed Electronically by: Wai Macias MD      Prior to immunization administration, verified patients identity using patient s name and date of birth. Please see Immunization Activity for additional information.     Screening Questionnaire for Adult Immunization    Are you sick today?   No   Do you have allergies to medications, food, a vaccine component or latex?   Yes   Have you ever had a serious reaction after receiving a vaccination?   No   Do you have a long-term health problem with heart, lung, kidney, or metabolic disease (e.g., diabetes), asthma, a blood disorder, no spleen, complement component deficiency, a cochlear implant, or a spinal fluid leak?  Are you on  long-term aspirin therapy?   Yes   Do you have cancer, leukemia, HIV/AIDS, or any other immune system problem?   No   Do you have a parent, brother, or sister with an immune system problem?   No   In the past 3 months, have you taken medications that affect  your immune system, such as prednisone, other steroids, or anticancer drugs; drugs for the treatment of rheumatoid arthritis, Crohn s disease, or psoriasis; or have you had radiation treatments?   No   Have you had a seizure, or a brain or other nervous system problem?   No   During the past year, have you received a transfusion of blood or blood    products, or been given immune (gamma) globulin or antiviral drug?   No   For women: Are you pregnant or is there a chance you could become       pregnant during the next month?   No   Have you received any vaccinations in the past 4 weeks?   No     Immunization questionnaire was positive for at least one answer.  Notified PROVIDER.      Patient instructed to remain in clinic for 15 minutes afterwards, and to report any adverse reactions.     Screening performed by Isabelle Prince MA on 10/15/2024 at 11:28 AM.

## 2024-10-17 LAB
BACTERIA UR CULT: NORMAL
CHOLEST SERPL-MCNC: 131 MG/DL
HDLC SERPL-MCNC: 54 MG/DL
LDLC SERPL CALC-MCNC: 65 MG/DL
NONHDLC SERPL-MCNC: 77 MG/DL
TRIGL SERPL-MCNC: 62 MG/DL

## 2024-10-18 ENCOUNTER — TELEPHONE (OUTPATIENT)
Dept: FAMILY MEDICINE | Facility: CLINIC | Age: 34
End: 2024-10-18
Payer: COMMERCIAL

## 2024-10-18 ENCOUNTER — APPOINTMENT (OUTPATIENT)
Dept: INTERPRETER SERVICES | Facility: CLINIC | Age: 34
End: 2024-10-18
Payer: COMMERCIAL

## 2024-10-18 NOTE — TELEPHONE ENCOUNTER
Called with a Adela . Relayed clinician's message below. Pt verbalized understanding. Pt reported feeling better and does not want to come in to be evaluated for low abdomen pain.    Jean-Pierre STEWART RN  [facility]

## 2024-10-18 NOTE — TELEPHONE ENCOUNTER
Wai Macias MD  P [facility]  Call: Cholesterol looks a lot better on the medicine.  Urine culture did not show a significant bacteria growth. I don't think you have a bladder infection. Do we need to see you again to further evaluate your low abdomen pain?

## 2024-10-22 ENCOUNTER — OFFICE VISIT (OUTPATIENT)
Dept: PULMONOLOGY | Facility: CLINIC | Age: 34
End: 2024-10-22
Attending: FAMILY MEDICINE
Payer: COMMERCIAL

## 2024-10-22 ENCOUNTER — OFFICE VISIT (OUTPATIENT)
Dept: PULMONOLOGY | Facility: CLINIC | Age: 34
End: 2024-10-22
Attending: NURSE PRACTITIONER
Payer: COMMERCIAL

## 2024-10-22 VITALS
WEIGHT: 152 LBS | HEIGHT: 58 IN | BODY MASS INDEX: 31.91 KG/M2 | DIASTOLIC BLOOD PRESSURE: 86 MMHG | HEART RATE: 97 BPM | OXYGEN SATURATION: 98 % | SYSTOLIC BLOOD PRESSURE: 128 MMHG

## 2024-10-22 DIAGNOSIS — Z91.09 ENVIRONMENTAL ALLERGIES: ICD-10-CM

## 2024-10-22 DIAGNOSIS — J45.40 MODERATE PERSISTENT ASTHMA WITHOUT COMPLICATION: Primary | ICD-10-CM

## 2024-10-22 DIAGNOSIS — J45.909 ASTHMA: ICD-10-CM

## 2024-10-22 LAB — HGB BLD-MCNC: 14.2 G/DL

## 2024-10-22 PROCEDURE — 94726 PLETHYSMOGRAPHY LUNG VOLUMES: CPT | Mod: 26 | Performed by: INTERNAL MEDICINE

## 2024-10-22 PROCEDURE — 99204 OFFICE O/P NEW MOD 45 MIN: CPT | Performed by: NURSE PRACTITIONER

## 2024-10-22 PROCEDURE — 94375 RESPIRATORY FLOW VOLUME LOOP: CPT | Mod: 26 | Performed by: INTERNAL MEDICINE

## 2024-10-22 PROCEDURE — 99207 PR NO BILLABLE SERVICE THIS VISIT: CPT | Performed by: INTERNAL MEDICINE

## 2024-10-22 PROCEDURE — 85018 HEMOGLOBIN: CPT | Mod: QW | Performed by: INTERNAL MEDICINE

## 2024-10-22 PROCEDURE — 94729 DIFFUSING CAPACITY: CPT | Mod: 26 | Performed by: INTERNAL MEDICINE

## 2024-10-22 NOTE — PATIENT INSTRUCTIONS
It was a pleasure to see you in clinic today.   Here is what we discussed:    Continue Symbicort two puffs twice daily, rinse/gargle after use.  You can also use this as needed as well, max 12 puffs/day.   Continue Albuterol every 4-6 hours as needed for shortness of breath or wheezing.  Call my nurse, Martin ([phone]) with any change or worsening of your breathing.  We can then send out your action plan medication (prednisone).   Follow-up in one year, sooner if issues arise.     Eduarda Jimenez, CNP  Pulmonary Medicine  [facility]  [phone]

## 2024-10-22 NOTE — PROGRESS NOTES
Pulmonary Clinic Consultation          Assessment/Plan:     34 year old female with a history of asthma, DM II, presenting to establish care in pulmonary clinic.      Moderate persistent asthma  Environmental allergies  Lifelong non-smoker presents for evaluation of asthma.  She reports issues with breathing when younger, her mother was concerned she had asthma.  She did not receive her diagnosis until after pregnancy when she was having trouble breathing.  She was first placed on Albuterol, then Symbicort - which she found benefit from.  Her main symptoms are chest tightness, wheezing, cough.  She is also suspicious of possible environmental allergies, such as dust and pollen.  She takes an OTC allergy pill (unsure which) from [employer] PRN, and finds benefit from this.  Eosinophils 0.2 in 2018.  No recent lung imaging on file.  Pulmonary function testing performed today, with normal spirometry, lung volumes, and increased diffusion capacity.  She currently feels like her asthma and allergies are under control.  ACT score 19.    Plan:  - reviewed PFT results with patient today.  - continue Symbicort (budesonide/formoterol) 160/4.5, two puffs BID, rinse/gargle after use.  She can use this PRN as well, max 12 puffs/day.  - continue Albuterol PRN.  - continue OTC allergy pill PRN.  If at any time her allergies or breathing are uncontrolled, could check blood work for allergies (IgE, eosinophils) and start Singulair 10mg.  - she is UTD with prevnar 20 and annual influenza.  Recommended for this fall:  COVID booster  - Action plan: prednisone 40mg x5 days      Follow-up:  - annually    Eduarda Jimenez, CNP  Pulmonary Medicine  [facility]  [phone]       CC:     Asthma      HPI:     34 year old female with a history of asthma, DM II, presenting to establish care in pulmonary clinic.      Diagnosed with asthma after pregnancy, had heavy breathing.   Mom was concerned about her  breathing when she was a child.   No inhalers as child.   Started albuterol after her pregnancy, didn't work so started symbicort.  This did help breathing.   Main symptoms are chest tightness, cough, wheezing.  Varies when happens.   Possible allergies, dust or pollen.  Sneezing, itchy eyes, marks on hands. Takes OTC allergy pill, possibly zyrtec.  Takes PRN, but this helps.   Breathing is fine right now.     Medical records reviewed for this visit include PCP notes.        10/25/2023     9:56 AM 2024     8:19 AM 10/15/2024    10:55 AM   ACT Total Scores   ACT TOTAL SCORE (Goal Greater than or Equal to 20) 13 10 19   In the past 12 months, how many times did you visit the emergency room for your asthma without being admitted to the hospital? 0 0 0   In the past 12 months, how many times were you hospitalized overnight because of your asthma? 0 0 0          ROS:     A 12-system review was obtained and was negative with the exception of the symptoms endorsed in the HPI.       Medical history:       PMH:  Past Medical History:   Diagnosis Date    GDM, class A2 2018    Gestational diabetes 2018    Hypertension     preeclampsia vs. Gest HTN    Mild persistent asthma without complication 2021    Premenstrual tension syndromes     S/P  section 2018    Urinary tract infection        PSH:  Past Surgical History:   Procedure Laterality Date    C/SECTION, LOW TRANSVERSE  10/2015     SECTION N/A 2018    Procedure:  SECTION, REPEAT;  Surgeon: Ronald Ang MD;  Location: [facility];  Service:     REPEAT  SECTION  2018    REPEAT / KURTIS ANG / [facility]        Allergies:  Allergies   Allergen Reactions    Dust Mites        Family Hx:  Family History   Problem Relation Age of Onset    Heart Defect Sister     No Known Problems Brother        Social Hx:  Social History     Socioeconomic History    Marital status: Single     Spouse name: Not on file    Number  of children: Not on file    Years of education: Not on file    Highest education level: Not on file   Occupational History    Not on file   Tobacco Use    Smoking status: Never     Passive exposure: Never    Smokeless tobacco: Never    Tobacco comments:     BETTLE NUT   Vaping Use    Vaping status: Never Used   Substance and Sexual Activity    Alcohol use: No    Drug use: No    Sexual activity: Yes     Partners: Male     Birth control/protection: OCP   Other Topics Concern    Not on file   Social History Narrative    Not on file     Social Determinants of Health     Financial Resource Strain: Low Risk  (8/19/2024)    Financial Resource Strain     Within the past 12 months, have you or your family members you live with been unable to get utilities (heat, electricity) when it was really needed?: No   Food Insecurity: Low Risk  (8/19/2024)    Food Insecurity     Within the past 12 months, did you worry that your food would run out before you got money to buy more?: No     Within the past 12 months, did the food you bought just not last and you didn t have money to get more?: No   Transportation Needs: Low Risk  (8/19/2024)    Transportation Needs     Within the past 12 months, has lack of transportation kept you from medical appointments, getting your medicines, non-medical meetings or appointments, work, or from getting things that you need?: No   Physical Activity: Insufficiently Active (8/19/2024)    Exercise Vital Sign     Days of Exercise per Week: 4 days     Minutes of Exercise per Session: 30 min   Stress: Stress Concern Present (8/19/2024)    [employer] [place] [employer] - Occupational Stress Questionnaire     Feeling of Stress : To some extent   Social Connections: Unknown (8/19/2024)    Social Connection and Isolation Panel [NHANES]     Frequency of Communication with Friends and Family: Not on file     Frequency of Social Gatherings with Friends and Family: More than three times a week      Attends [employer] Services: Not on file     Active Member of Clubs or Organizations: Not on file     Attends Club or Organization Meetings: Not on file     Marital Status: Not on file   Interpersonal Safety: Low Risk  (8/19/2024)    Interpersonal Safety     Do you feel physically and emotionally safe where you currently live?: Yes     Within the past 12 months, have you been hit, slapped, kicked or otherwise physically hurt by someone?: No     Within the past 12 months, have you been humiliated or emotionally abused in other ways by your partner or ex-partner?: No   Housing Stability: Low Risk  (8/19/2024)    Housing Stability     Do you have housing? : Yes     Are you worried about losing your housing?: No       Current Meds:  Current Outpatient Medications   Medication Sig Dispense Refill    albuterol (PROAIR HFA;PROVENTIL HFA;VENTOLIN HFA) 90 mcg/actuation inhaler [ALBUTEROL (PROAIR HFA;PROVENTIL HFA;VENTOLIN HFA) 90 MCG/ACTUATION INHALER] Inhale 2 puffs every 6 (six) hours as needed for wheezing. 1 each 2    atorvastatin (LIPITOR) 40 MG tablet Take 1 tablet (40 mg) by mouth daily. 90 tablet 1    budesonide-formoterol (SYMBICORT) 160-4.5 MCG/ACT Inhaler Inhale 2 puffs twice daily plus 1-2 puffs as needed. May use up to 12 puffs per day. 20.4 g 11    canagliflozin (INVOKANA) 300 MG tablet TAKE 1 TABLET (300 MG) BY MOUTH EVERY MORNING (BEFORE BREAKFAST) 90 tablet 1    CONTOUR NEXT TEST test strip Use to test blood sugar 2 times daily. 200 strip 4    levonorgestrel-ethinyl estradiol (AVIANE) 0.1-20 MG-MCG tablet Take 1 tablet by mouth daily 90 tablet 0    metFORMIN (GLUCOPHAGE) 1000 MG tablet TAKE 1 TABLET (1,000 MG) BY MOUTH 2 TIMES DAILY (WITH MEALS) 180 tablet 1    Microlet Lancets MISC 100 each 2 times daily Use to test blood sugar 2 times daily. 100 each 4    OneTouch Delica Lancets 33G MISC 1 each daily 200 each 4    semaglutide (OZEMPIC) 2 MG/3ML pen Inject 0.5 mg subcutaneously every 7 days. 6 mL 1           "Physical Exam:     /86 (BP Location: Left arm, Patient Position: Sitting, Cuff Size: Adult Regular)   Pulse 97   Ht 1.476 m (4' 10.1\")   Wt 68.9 kg (152 lb)   LMP 09/05/2024   SpO2 98%   BMI 31.66 kg/m    Gen: adult female, appears in NAD  HEENT: clear conjunctivae, moist mucous membranes  CV: RRR, no M/G/R  Resp: CTAB, no focal crackles or wheezes.  Respirations even and unlabored.  On RA.  No cough.  Skin: no apparent rashes on visible skin  Ext: no cyanosis, clubbing or edema  Neuro: alert and answering questions appropriately       Data:     Labs:  reviewed    Imaging studies:  I have personally reviewed all pertinent imaging studies and PFT results unless otherwise noted.    No results found for this or any previous visit (from the past 744 hour(s)).      Pulmonary Function Testing        "

## 2024-10-23 LAB
DLCOCOR-%PRED-PRE: 134 %
DLCOCOR-PRE: 24.98 ML/MIN/MMHG
DLCOUNC-%PRED-PRE: 137 %
DLCOUNC-PRE: 25.57 ML/MIN/MMHG
DLCOUNC-PRED: 18.58 ML/MIN/MMHG
ERV-%PRED-PRE: 99 %
ERV-PRE: 1 L
ERV-PRED: 1 L
EXPTIME-PRE: 5.6 SEC
FEF2575-%PRED-PRE: 77 %
FEF2575-PRE: 2.13 L/SEC
FEF2575-PRED: 2.75 L/SEC
FEFMAX-%PRED-PRE: 56 %
FEFMAX-PRE: 3.44 L/SEC
FEFMAX-PRED: 6.14 L/SEC
FEV1-%PRED-PRE: 89 %
FEV1-PRE: 2.11 L
FEV1FEV6-PRE: 82 %
FEV1FEV6-PRED: 84 %
FEV1FVC-PRE: 81 %
FEV1FVC-PRED: 85 %
FEV1SVC-PRE: 82 %
FEV1SVC-PRED: 80 %
FIFMAX-PRE: 2.95 L/SEC
FRCPLETH-%PRED-PRE: 88 %
FRCPLETH-PRE: 2.08 L
FRCPLETH-PRED: 2.34 L
FVC-%PRED-PRE: 94 %
FVC-PRE: 2.62 L
FVC-PRED: 2.76 L
IC-%PRED-PRE: 78 %
IC-PRE: 1.58 L
IC-PRED: 2.01 L
RVPLETH-%PRED-PRE: 85 %
RVPLETH-PRE: 1.07 L
RVPLETH-PRED: 1.25 L
TLCPLETH-%PRED-PRE: 92 %
TLCPLETH-PRE: 3.66 L
TLCPLETH-PRED: 3.95 L
VA-%PRED-PRE: 85 %
VA-PRE: 3.39 L
VC-%PRED-PRE: 87 %
VC-PRE: 2.58 L
VC-PRED: 2.94 L

## 2025-07-21 ENCOUNTER — PATIENT OUTREACH (OUTPATIENT)
Dept: CARE COORDINATION | Facility: CLINIC | Age: 35
End: 2025-07-21
Payer: COMMERCIAL

## 2025-08-04 ENCOUNTER — PATIENT OUTREACH (OUTPATIENT)
Dept: CARE COORDINATION | Facility: CLINIC | Age: 35
End: 2025-08-04
Payer: COMMERCIAL